# Patient Record
Sex: FEMALE | Race: WHITE | Employment: OTHER | URBAN - METROPOLITAN AREA
[De-identification: names, ages, dates, MRNs, and addresses within clinical notes are randomized per-mention and may not be internally consistent; named-entity substitution may affect disease eponyms.]

---

## 2017-07-06 ENCOUNTER — OFFICE VISIT (OUTPATIENT)
Dept: INTERNAL MEDICINE CLINIC | Age: 72
End: 2017-07-06

## 2017-07-06 VITALS
OXYGEN SATURATION: 98 % | HEIGHT: 68 IN | RESPIRATION RATE: 16 BRPM | SYSTOLIC BLOOD PRESSURE: 118 MMHG | TEMPERATURE: 97.9 F | HEART RATE: 72 BPM | DIASTOLIC BLOOD PRESSURE: 74 MMHG | WEIGHT: 131 LBS | BODY MASS INDEX: 19.85 KG/M2

## 2017-07-06 DIAGNOSIS — R73.09 ELEVATED GLUCOSE: ICD-10-CM

## 2017-07-06 DIAGNOSIS — Q27.9 VENOUS MALFORMATION: ICD-10-CM

## 2017-07-06 DIAGNOSIS — M85.80 OSTEOPENIA, UNSPECIFIED LOCATION: ICD-10-CM

## 2017-07-06 DIAGNOSIS — E78.00 HYPERCHOLESTEROLEMIA: ICD-10-CM

## 2017-07-06 DIAGNOSIS — E03.9 ACQUIRED HYPOTHYROIDISM: Primary | ICD-10-CM

## 2017-07-06 NOTE — PATIENT INSTRUCTIONS
As discussed in your appointment today, [address] is an important part of planning for your healthcare future. Discussing your preferences with your family and your care team is a part of good healthcare so that we can be guided by your known values and goals. Our office offers this service at no cost to you. Our Nurse Navigators and certified [employer] Cho[employer] Facilitators, Asuncion Hammer and Janet Reyes typically schedule family appointments for this service on Wednesdays.  To schedule an Advance Care Planning visit or to receive more information about this service, please call [facility] Internal Medicine at [phone] and ask to speak directly to Tasha Sanchez or [employer]

## 2017-07-06 NOTE — PROGRESS NOTES
HPI:  Gisselle Singh is a 67y.o. year old female who returns to clinic today for follow up: hypothyroid, hypercholesterolemia,     1. Cardiovascular:   Diet and Lifestyle: generally follows a low fat low cholesterol diet, generally follows a low sodium diet. Exercise: yardwork. 2. Hypothyroid: Thyroid ROS: denies fatigue, weight changes, heat/cold intolerance, bowel/skin changes or CVS symptoms. .   3. Osteopenia:  Taking calcium and vit D,  last bone density reviewed. No falls. Current Outpatient Prescriptions   Medication Sig Dispense Refill    levothyroxine (SYNTHROID) 125 mcg tablet Take 1 Tab by mouth Daily (before breakfast). 30 Tab 11    ranitidine (ZANTAC) 150 mg tablet Take 150 mg by mouth daily as needed.  ibuprofen (ADVIL) 200 mg tablet Take  by mouth every six (6) hours as needed.  VALTREX 500 mg tablet as needed.  CALCIUM CARBONATE/VITAMIN D3 (CALCIUM 600 + D PO) Take  by mouth two (2) times a day.  multivitamins-minerals-lutein (CENTRUM SILVER) Tab Take  by mouth. Allergies   Allergen Reactions    Indocin [Indomethacin Sodium] Shortness of Breath and Swelling     Ankles & legs    Boniva [Ibandronate] Other (comments)     indigestion    Fosamax [Alendronate] Other (comments)     Indigestion       Social History   Substance Use Topics    Smoking status: Former Smoker     Quit date: 1960    Smokeless tobacco: Never Used    Alcohol use No         Review of Systems   Constitutional: Negative for malaise/fatigue. Respiratory: Negative for shortness of breath. Cardiovascular: Negative for chest pain and leg swelling. Gastrointestinal: Negative for abdominal pain and heartburn. Neurological: Negative for dizziness and headaches. Physical Exam   Constitutional: She appears well-developed. No distress.    /74  Pulse 72  Temp 97.9 °F (36.6 °C) (Oral)   Resp 16  Ht 5' 8\" (1.727 m)  Wt 131 lb (59.4 kg)  SpO2 98%  BMI 19.92 kg/m2   Neck: Carotid bruit is not present. No thyromegaly present. Cardiovascular: Normal rate, regular rhythm, normal heart sounds and intact distal pulses. No murmur heard. Pulmonary/Chest: Effort normal and breath sounds normal. She has no wheezes. Abdominal: Soft. Bowel sounds are normal. She exhibits no distension and no mass. There is no tenderness. Musculoskeletal: She exhibits no edema. Psychiatric: She has a normal mood and affect. Vitals reviewed. Assessment & Plan:    ICD-10-CM ICD-9-CM    1. Acquired hypothyroidism  Continue current plan and check lab work. E03.9 244.9 TSH 3RD GENERATION   2. Hypercholesterolemia  Please continue to work on low fat, low cholesterol diet and exercise. E78.00 272.0 LIPID PANEL      CBC WITH AUTOMATED DIFF      METABOLIC PANEL, COMPREHENSIVE   3. Osteopenia, unspecified location   Ca vit D and exercise M85.80 733.90 DEXA BONE DENSITY STUDY AXIAL   4. Elevated glucose  Decrease sugar in diet. R73.09 790.29 HEMOGLOBIN A1C WITH EAG      Orders Placed This Encounter    DEXA BONE DENSITY STUDY AXIAL    LIPID PANEL    HEMOGLOBIN A1C WITH EAG    CBC WITH AUTOMATED DIFF    METABOLIC PANEL, COMPREHENSIVE    TSH 3RD GENERATION      Follow-up Disposition:  Return in about 6 months (around 1/6/2018) for follow up. Advised her to call back or return to office if symptoms worsen/change/persist.  Discussed expected course/resolution/complications of diagnosis in detail with patient. Medication risks/benefits/costs/interactions/alternatives discussed with patient. She was given an after visit summary which includes diagnoses, current medications, & vitals. She expressed understanding with the diagnosis and plan.

## 2017-07-06 NOTE — ACP (ADVANCE CARE PLANNING)
As discussed in your appointment today, [address] is an important part of planning for your healthcare future. Discussing your preferences with your family and your care team is a part of good healthcare so that we can be guided by your known values and goals. Our office offers this service at no cost to you. Our Nurse Navigators and certified Respecting Choices ® Facilitators, Willy Sampson and Delmis Nix typically schedule family appointments for this service on Wednesdays.  To schedule an Advance Care Planning visit or to receive more information about this service, please call [employer] [facility] Internal Medicine at [phone] and ask to speak directly to Kenna Magdaleno or Cheryl Rose

## 2017-08-08 LAB
ALBUMIN SERPL-MCNC: 4.3 G/DL (ref 3.5–4.8)
ALBUMIN/GLOB SERPL: 2 {RATIO} (ref 1.2–2.2)
ALP SERPL-CCNC: 65 IU/L (ref 39–117)
ALT SERPL-CCNC: 23 IU/L (ref 0–32)
AST SERPL-CCNC: 28 IU/L (ref 0–40)
BASOPHILS # BLD AUTO: 0.1 X10E3/UL (ref 0–0.2)
BASOPHILS NFR BLD AUTO: 1 %
BILIRUB SERPL-MCNC: 0.4 MG/DL (ref 0–1.2)
BUN SERPL-MCNC: 10 MG/DL (ref 8–27)
BUN/CREAT SERPL: 16 (ref 12–28)
CALCIUM SERPL-MCNC: 9.6 MG/DL (ref 8.7–10.3)
CHLORIDE SERPL-SCNC: 103 MMOL/L (ref 96–106)
CHOLEST SERPL-MCNC: 186 MG/DL (ref 100–199)
CO2 SERPL-SCNC: 25 MMOL/L (ref 18–29)
CREAT SERPL-MCNC: 0.64 MG/DL (ref 0.57–1)
EOSINOPHIL # BLD AUTO: 0.1 X10E3/UL (ref 0–0.4)
EOSINOPHIL NFR BLD AUTO: 2 %
ERYTHROCYTE [DISTWIDTH] IN BLOOD BY AUTOMATED COUNT: 14.6 % (ref 12.3–15.4)
EST. AVERAGE GLUCOSE BLD GHB EST-MCNC: 114 MG/DL
GLOBULIN SER CALC-MCNC: 2.2 G/DL (ref 1.5–4.5)
GLUCOSE SERPL-MCNC: 97 MG/DL (ref 65–99)
HBA1C MFR BLD: 5.6 % (ref 4.8–5.6)
HCT VFR BLD AUTO: 39.9 % (ref 34–46.6)
HDLC SERPL-MCNC: 59 MG/DL
HGB BLD-MCNC: 12.7 G/DL (ref 11.1–15.9)
IMM GRANULOCYTES # BLD: 0 X10E3/UL (ref 0–0.1)
IMM GRANULOCYTES NFR BLD: 0 %
LDLC SERPL CALC-MCNC: 108 MG/DL (ref 0–99)
LYMPHOCYTES # BLD AUTO: 1.1 X10E3/UL (ref 0.7–3.1)
LYMPHOCYTES NFR BLD AUTO: 26 %
MCH RBC QN AUTO: 28.7 PG (ref 26.6–33)
MCHC RBC AUTO-ENTMCNC: 31.8 G/DL (ref 31.5–35.7)
MCV RBC AUTO: 90 FL (ref 79–97)
MONOCYTES # BLD AUTO: 0.3 X10E3/UL (ref 0.1–0.9)
MONOCYTES NFR BLD AUTO: 7 %
NEUTROPHILS # BLD AUTO: 2.6 X10E3/UL (ref 1.4–7)
NEUTROPHILS NFR BLD AUTO: 64 %
PLATELET # BLD AUTO: 292 X10E3/UL (ref 150–379)
POTASSIUM SERPL-SCNC: 4.8 MMOL/L (ref 3.5–5.2)
PROT SERPL-MCNC: 6.5 G/DL (ref 6–8.5)
RBC # BLD AUTO: 4.43 X10E6/UL (ref 3.77–5.28)
SODIUM SERPL-SCNC: 143 MMOL/L (ref 134–144)
TRIGL SERPL-MCNC: 95 MG/DL (ref 0–149)
TSH SERPL DL<=0.005 MIU/L-ACNC: 0.32 UIU/ML (ref 0.45–4.5)
VLDLC SERPL CALC-MCNC: 19 MG/DL (ref 5–40)
WBC # BLD AUTO: 4.1 X10E3/UL (ref 3.4–10.8)

## 2017-08-14 NOTE — PROGRESS NOTES
Notify patient that TSH is not at goal. Confirm taking medication daily and if so will need to decrease levothyroxine to 112 mcg qam # 30 and #1 RF. Recheck TSH in 6 weeks. Otherwise lab work looks good and her cholesterol has improved. Keep up the good work.

## 2017-08-16 DIAGNOSIS — E03.9 ACQUIRED HYPOTHYROIDISM: Primary | ICD-10-CM

## 2017-08-16 RX ORDER — LEVOTHYROXINE SODIUM 112 UG/1
112 TABLET ORAL
Qty: 30 TAB | Refills: 1 | Status: SHIPPED | OUTPATIENT
Start: 2017-08-16 | End: 2017-10-09 | Stop reason: SDUPTHER

## 2017-08-16 NOTE — PROGRESS NOTES
Patient returned call and was notified that TSH is not at goal. Confirmed with pt and she stated is taking medication daily and so informed pt  will need to decrease levothyroxine to 112 mcg qam # 30 and #1 RF. And also informed pt will  Recheck TSH in 6 weeks. Otherwise lab work looks good and her cholesterol has improved. Keep up the good work. Pt verbalized understanding.

## 2017-09-29 LAB — TSH SERPL DL<=0.005 MIU/L-ACNC: 2.21 UIU/ML (ref 0.45–4.5)

## 2017-10-09 DIAGNOSIS — E03.9 ACQUIRED HYPOTHYROIDISM: ICD-10-CM

## 2017-10-09 RX ORDER — LEVOTHYROXINE SODIUM 112 UG/1
112 TABLET ORAL
Qty: 30 TAB | Refills: 11 | Status: SHIPPED | OUTPATIENT
Start: 2017-10-09 | End: 2018-08-28 | Stop reason: DRUGHIGH

## 2017-10-12 DIAGNOSIS — M85.80 OSTEOPENIA, UNSPECIFIED LOCATION: ICD-10-CM

## 2017-12-04 ENCOUNTER — HOSPITAL ENCOUNTER (OUTPATIENT)
Dept: MAMMOGRAPHY | Age: 72
Discharge: HOME | End: 2017-12-04
Attending: INTERNAL MEDICINE
Payer: COMMERCIAL

## 2017-12-04 DIAGNOSIS — Z12.39 BREAST SCREENING: ICD-10-CM

## 2017-12-04 PROCEDURE — 77067 SCR MAMMO BI INCL CAD: CPT

## 2018-08-16 ENCOUNTER — OFFICE VISIT (OUTPATIENT)
Dept: INTERNAL MEDICINE CLINIC | Age: 73
End: 2018-08-16

## 2018-08-16 VITALS
BODY MASS INDEX: 19.67 KG/M2 | HEART RATE: 75 BPM | SYSTOLIC BLOOD PRESSURE: 132 MMHG | HEIGHT: 68 IN | RESPIRATION RATE: 16 BRPM | WEIGHT: 129.8 LBS | DIASTOLIC BLOOD PRESSURE: 70 MMHG | TEMPERATURE: 98 F | OXYGEN SATURATION: 96 %

## 2018-08-16 DIAGNOSIS — K21.9 GASTROESOPHAGEAL REFLUX DISEASE WITHOUT ESOPHAGITIS: ICD-10-CM

## 2018-08-16 DIAGNOSIS — E78.00 HYPERCHOLESTEROLEMIA: ICD-10-CM

## 2018-08-16 DIAGNOSIS — Z00.00 MEDICARE ANNUAL WELLNESS VISIT, SUBSEQUENT: Primary | ICD-10-CM

## 2018-08-16 DIAGNOSIS — E03.9 ACQUIRED HYPOTHYROIDISM: ICD-10-CM

## 2018-08-16 DIAGNOSIS — Z13.31 SCREENING FOR DEPRESSION: ICD-10-CM

## 2018-08-16 DIAGNOSIS — M85.80 OSTEOPENIA, UNSPECIFIED LOCATION: ICD-10-CM

## 2018-08-16 DIAGNOSIS — Z13.39 SCREENING FOR ALCOHOLISM: ICD-10-CM

## 2018-08-16 DIAGNOSIS — A08.4 VIRAL GASTROENTERITIS: ICD-10-CM

## 2018-08-16 RX ORDER — OMEPRAZOLE 20 MG/1
20 CAPSULE, DELAYED RELEASE ORAL
COMMUNITY
Start: 2018-08-14 | End: 2022-04-28 | Stop reason: ALTCHOICE

## 2018-08-16 RX ORDER — ASPIRIN 81 MG/1
TABLET ORAL DAILY
COMMUNITY
End: 2019-11-14 | Stop reason: ALTCHOICE

## 2018-08-16 NOTE — PROGRESS NOTES
Chief Complaint   Patient presents with   [address] Visit     physical     Patient states she is here for her AWV physical.

## 2018-08-16 NOTE — PROGRESS NOTES
This is the Subsequent Medicare Annual Wellness Exam, performed 12 months or more after the Initial AWV or the last Subsequent AWV    I have reviewed the patient's medical history in detail and updated the computerized patient record. History     Past Medical History:   Diagnosis Date    Cancer ([facility] [address] 2016    basal cell skin carcinoma removed.  Gastroesophageal reflux disease without esophagitis 8/18/2016    Herpes     right gluteal outbreaks    Osteoporosis     Thyroid disease     hypothyroidism    Venous malformation 5/19/2015    Left foot and has had embolization procedure. Past Surgical History:   Procedure Laterality Date    HX DILATION AND CURETTAGE      HX OTHER SURGICAL  03/2015    emobolization of left foot for vascular malformation     Current Outpatient Prescriptions   Medication Sig Dispense Refill    omeprazole (PRILOSEC) 20 mg capsule       aspirin delayed-release 81 mg tablet Take  by mouth daily.  levothyroxine (SYNTHROID) 112 mcg tablet Take 1 Tab by mouth Daily (before breakfast). 30 Tab 11    ibuprofen (ADVIL) 200 mg tablet Take  by mouth every six (6) hours as needed.  VALTREX 500 mg tablet as needed.  CALCIUM CARBONATE/VITAMIN D3 (CALCIUM 600 + D PO) Take  by mouth two (2) times a day.  multivitamins-minerals-lutein (CENTRUM SILVER) Tab Take  by mouth.  ranitidine (ZANTAC) 150 mg tablet Take 150 mg by mouth daily as needed.        Allergies   Allergen Reactions    Indocin [Indomethacin Sodium] Shortness of Breath and Swelling     Ankles & legs    Boniva [Ibandronate] Other (comments)     indigestion    Fosamax [Alendronate] Other (comments)     Indigestion       Family History   Problem Relation Age of Onset    Hypertension Mother     Cancer Father     Heart Disease Father      cad    Cancer Sister 72     breast    Breast Cancer Sister 59     Social History   Substance Use Topics    Smoking status: Former Smoker     Quit date: 1960    Smokeless tobacco: Never Used    Alcohol use No     Patient Active Problem List   Diagnosis Code    Hypercholesterolemia E78.00    Hypothyroidism E03.9    Osteopenia M85.80    Venous malformation Q27.9    Advance directive discussed with patient Z71.89    Gastroesophageal reflux disease without esophagitis K21.9       Depression Risk Factor Screening:     PHQ over the last two weeks 8/16/2018   Little interest or pleasure in doing things Not at all   Feeling down, depressed, irritable, or hopeless Not at all   Total Score PHQ 2 0     Alcohol Risk Factor Screening:   Reviewed and not at risk. Functional Ability and Level of Safety:   Hearing Loss  Decreased hearing but does not want hearing testing at this time. Activities of Daily Living  The home contains: no safety equipment. Patient does total self care    Fall Risk  Fall Risk Assessment, last 12 mths 8/16/2018   Able to walk? Yes   Fall in past 12 months? No       Abuse Screen  Patient is not abused    Cognitive Screening   Evaluation of Cognitive Function:  Has your family/caregiver stated any concerns about your memory: no  Normal    Patient Care Team   Patient Care Team:  Rica Riley MD as PCP - Anna Marie Pink MD (Inactive) as Physician (Ophthalmology)  Conner Jackson MD as Physician (Obstetrics & Gynecology)    Assessment/Plan   Education and counseling provided:  Are appropriate based on today's review and evaluation    Health Maintenance Due   Topic Date Due    Influenza Age 5 to Adult  08/01/2018     HPI:  Pranay Page is also here today for follow-up of her medical problems. 1.  Gerd: takes prilosec prn. Improves with dietary changes. 2.  Hypothyroid: Thyroid ROS: denies  weight changes, heat/cold intolerance, bowel/skin changes or CVS symptoms. 3.  A few weeks ago had gastroenteritis that has resolved except for some fatigue. She was seen last week by her gyn and had normal urine and scheduled for pelvic US.  States abdominal pain much improved. Current Outpatient Prescriptions   Medication Sig Dispense Refill    omeprazole (PRILOSEC) 20 mg capsule       aspirin delayed-release 81 mg tablet Take  by mouth daily.  levothyroxine (SYNTHROID) 112 mcg tablet Take 1 Tab by mouth Daily (before breakfast). 30 Tab 11    ibuprofen (ADVIL) 200 mg tablet Take  by mouth every six (6) hours as needed.  VALTREX 500 mg tablet as needed.  CALCIUM CARBONATE/VITAMIN D3 (CALCIUM 600 + D PO) Take  by mouth two (2) times a day.  multivitamins-minerals-lutein (CENTRUM SILVER) Tab Take  by mouth. Allergies   Allergen Reactions    Indocin [Indomethacin Sodium] Shortness of Breath and Swelling     Ankles & legs    Boniva [Ibandronate] Other (comments)     indigestion    Fosamax [Alendronate] Other (comments)     Indigestion       Social History   Substance Use Topics    Smoking status: Former Smoker     Quit date: 1960    Smokeless tobacco: Never Used    Alcohol use No         Review of Systems   Constitutional: Positive for malaise/fatigue. Negative for chills and fever. HENT: Positive for hearing loss (unchanges). Negative for congestion and sore throat. Eyes: Negative for double vision. Respiratory: Negative for shortness of breath. Cardiovascular: Negative for chest pain, palpitations and leg swelling. Gastrointestinal: Negative for heartburn. Genitourinary: Negative for dysuria, frequency and urgency. Musculoskeletal: Positive for back pain (low back pain) and joint pain (arthritis in knees and shoulders. ). Neurological: Positive for dizziness (slight feeling of lightheadedness for about 1 min for the past 2 weeks and has improved. ). Negative for sensory change, speech change, focal weakness and headaches. Psychiatric/Behavioral: Negative for depression, memory loss and suicidal ideas. The patient is not nervous/anxious and does not have insomnia.         Physical Exam   Constitutional: She appears well-developed. No distress. /70 (BP 1 Location: Left arm, BP Patient Position: Sitting)  Pulse 75  Temp 98 °F (36.7 °C) (Oral)   Resp 16  Ht 5' 8\" (1.727 m)  Wt 129 lb 12.8 oz (58.9 kg)  SpO2 96%  BMI 19.74 kg/m2   HENT:   Head: Normocephalic and atraumatic. Right Ear: Tympanic membrane and ear canal normal.   Left Ear: Tympanic membrane and ear canal normal.   Nose: Nose normal.   Mouth/Throat: Oropharynx is clear and moist.   Eyes: Conjunctivae and EOM are normal. Pupils are equal, round, and reactive to light. Neck: Normal range of motion. No thyromegaly present. Cardiovascular: Normal rate, regular rhythm, normal heart sounds and intact distal pulses. No murmur heard. Pulmonary/Chest: Effort normal and breath sounds normal.   Breast exam: breasts appear normal, no suspicious masses, no skin or nipple changes or axillary nodes. Abdominal: Soft. Bowel sounds are normal. She exhibits no mass. There is tenderness (mildly tender right lower abdominal quadrant. ). Musculoskeletal: She exhibits no edema or tenderness. Lymphadenopathy:     She has no cervical adenopathy. Neurological: She has normal strength. No cranial nerve deficit or sensory deficit. Skin: No rash noted. Psychiatric: She has a normal mood and affect. Vitals reviewed. Assessment & Plan:    ICD-10-CM ICD-9-CM    1. Medicare annual wellness visit, subsequent  Health maintenance reviewed and updated with patient today at visit. Z00.00 V70.0    2. Screening for alcoholism Z13.89 V79.1 [place] ANNUAL ALCOHOL SCREEN 15 MIN   3. Screening for depression Z13.89 V79.0 DEPRESSION SCREEN ANNUAL   4. Gastroesophageal reflux disease without esophagitis  Well controlled, continue current medication. K21.9 530.81    5. Osteopenia, unspecified location  Calcium, vitamin D, exercise. M85.80 733.90    6. Acquired hypothyroidism  Continue current plan and check lab work. E03.9 244.9 TSH 3RD GENERATION   7. Hypercholesterolemia  Counseled regarding low-fat low-cholesterol diet. E78.00 272.0 CBC WITH AUTOMATED DIFF      METABOLIC PANEL, COMPREHENSIVE      LIPID PANEL   8. Viral gastroenteritis  Appears to be resolving push fluids and add probiotic. If any acute worsening follow-up. A08.4 008.8        Follow-up Disposition:  Return in about 1 year (around 8/16/2019) for physical.   Stefania Bailey her to call back or return to office if symptoms worsen/change/persist.  Discussed expected course/resolution/complications of diagnosis in detail with patient. Medication risks/benefits/costs/interactions/alternatives discussed with patient. She was given an after visit summary which includes diagnoses, current medications, & vitals. She expressed understanding with the diagnosis and plan.

## 2018-08-21 LAB
ALBUMIN SERPL-MCNC: 4.3 G/DL (ref 3.5–4.8)
ALBUMIN/GLOB SERPL: 1.6 {RATIO} (ref 1.2–2.2)
ALP SERPL-CCNC: 75 IU/L (ref 39–117)
ALT SERPL-CCNC: 20 IU/L (ref 0–32)
AST SERPL-CCNC: 25 IU/L (ref 0–40)
BASOPHILS # BLD AUTO: 0.1 X10E3/UL (ref 0–0.2)
BASOPHILS NFR BLD AUTO: 2 %
BILIRUB SERPL-MCNC: 0.2 MG/DL (ref 0–1.2)
BUN SERPL-MCNC: 10 MG/DL (ref 8–27)
BUN/CREAT SERPL: 12 (ref 12–28)
CALCIUM SERPL-MCNC: 9.9 MG/DL (ref 8.7–10.3)
CHLORIDE SERPL-SCNC: 101 MMOL/L (ref 96–106)
CHOLEST SERPL-MCNC: 195 MG/DL (ref 100–199)
CO2 SERPL-SCNC: 23 MMOL/L (ref 20–29)
CREAT SERPL-MCNC: 0.82 MG/DL (ref 0.57–1)
EOSINOPHIL # BLD AUTO: 0.1 X10E3/UL (ref 0–0.4)
EOSINOPHIL NFR BLD AUTO: 2 %
ERYTHROCYTE [DISTWIDTH] IN BLOOD BY AUTOMATED COUNT: 13.8 % (ref 12.3–15.4)
GLOBULIN SER CALC-MCNC: 2.7 G/DL (ref 1.5–4.5)
GLUCOSE SERPL-MCNC: 98 MG/DL (ref 65–99)
HCT VFR BLD AUTO: 41.5 % (ref 34–46.6)
HDLC SERPL-MCNC: 50 MG/DL
HGB BLD-MCNC: 12.9 G/DL (ref 11.1–15.9)
IMM GRANULOCYTES # BLD: 0 X10E3/UL (ref 0–0.1)
IMM GRANULOCYTES NFR BLD: 0 %
LDLC SERPL CALC-MCNC: 116 MG/DL (ref 0–99)
LYMPHOCYTES # BLD AUTO: 1.6 X10E3/UL (ref 0.7–3.1)
LYMPHOCYTES NFR BLD AUTO: 29 %
MCH RBC QN AUTO: 28.4 PG (ref 26.6–33)
MCHC RBC AUTO-ENTMCNC: 31.1 G/DL (ref 31.5–35.7)
MCV RBC AUTO: 91 FL (ref 79–97)
MONOCYTES # BLD AUTO: 0.4 X10E3/UL (ref 0.1–0.9)
MONOCYTES NFR BLD AUTO: 8 %
NEUTROPHILS # BLD AUTO: 3.1 X10E3/UL (ref 1.4–7)
NEUTROPHILS NFR BLD AUTO: 59 %
PLATELET # BLD AUTO: 382 X10E3/UL (ref 150–379)
POTASSIUM SERPL-SCNC: 4.9 MMOL/L (ref 3.5–5.2)
PROT SERPL-MCNC: 7 G/DL (ref 6–8.5)
RBC # BLD AUTO: 4.54 X10E6/UL (ref 3.77–5.28)
SODIUM SERPL-SCNC: 140 MMOL/L (ref 134–144)
TRIGL SERPL-MCNC: 146 MG/DL (ref 0–149)
TSH SERPL DL<=0.005 MIU/L-ACNC: 5.88 UIU/ML (ref 0.45–4.5)
VLDLC SERPL CALC-MCNC: 29 MG/DL (ref 5–40)
WBC # BLD AUTO: 5.3 X10E3/UL (ref 3.4–10.8)

## 2018-08-22 NOTE — PROGRESS NOTES
Notify patient that TSH is not at goal. Confirm taking medication daily and if so will need to increase levothyroxine to 125 mcg qam # 90 and #0 RF. Recheck TSH in 6 weeks. There is some mild elevation in her cholesterol continue with healthy diet low in fat and continue with exercise. Otherwise lab work looks fine.

## 2018-08-28 DIAGNOSIS — E03.9 ACQUIRED HYPOTHYROIDISM: Primary | ICD-10-CM

## 2018-08-28 RX ORDER — LEVOTHYROXINE SODIUM 125 UG/1
125 TABLET ORAL
Qty: 90 TAB | Refills: 0 | Status: SHIPPED | OUTPATIENT
Start: 2018-08-28 | End: 2018-11-23 | Stop reason: SDUPTHER

## 2018-08-28 RX ORDER — LEVOTHYROXINE SODIUM 125 UG/1
125 TABLET ORAL
Qty: 90 TAB | Refills: 1 | Status: SHIPPED | OUTPATIENT
Start: 2018-08-28 | End: 2018-08-28 | Stop reason: SDUPTHER

## 2018-08-28 NOTE — TELEPHONE ENCOUNTER
Spoke with patient via phone and she states that she has not missed any doses of medication and that she takes meds everyday. Informed her that per Dr. Tamiko Salmon then we will need to increase her levothyroxine to 125 mcg every morning. Meds sent to pharmacy per MD order on lab result.

## 2018-08-28 NOTE — TELEPHONE ENCOUNTER
----- Message from Corey Mejias Harlo Nissen sent at 8/27/2018 12:02 PM EDT -----  Regarding: RE:lab results  Contact: [phone]  Have not missed taking synthroid. Even took it the morning I had lab work done. Please call in prescription. Will do better with diet.  ----- Message -----  From: Jeanie Mccormick LPN  Sent: 2/75/6115  8:55 AM EDT  To: Corey Hester. Mary Nissen  Subject: lab results    Dear Wero Rojas, Dr Brigitte Meza wants to verify you have not missed any of your synthroid as your Tsh is not at goal. If you did not miss any then she needs to increase your dose to 125mcg daily. Then you would need to recheck the lab in 6wks to see that you're back in range. Also your cholesterol is slightly elevated, Dr Brigitte Meza wants you to remain on a low fat diet and exercise. Please confirm this message si I know if we need to send in a new dose .  Thanks, Pelon Low

## 2018-10-04 ENCOUNTER — HOSPITAL ENCOUNTER (OUTPATIENT)
Dept: LAB | Age: 73
Discharge: HOME | End: 2018-10-04
Payer: MEDICARE

## 2018-10-04 PROCEDURE — 84443 ASSAY THYROID STIM HORMONE: CPT

## 2018-10-05 LAB — TSH SERPL DL<=0.005 MIU/L-ACNC: 0.73 UIU/ML (ref 0.45–4.5)

## 2018-11-23 DIAGNOSIS — E03.9 ACQUIRED HYPOTHYROIDISM: ICD-10-CM

## 2018-11-26 RX ORDER — LEVOTHYROXINE SODIUM 125 UG/1
125 TABLET ORAL
Qty: 90 TAB | Refills: 0 | OUTPATIENT
Start: 2018-11-26

## 2018-11-26 NOTE — TELEPHONE ENCOUNTER
Last OV: 8-16-18  Next visit: 8-20-19  Last labs: 10-4-18    Refill request for levothyroxine forwarded to provider for approval.

## 2018-12-06 ENCOUNTER — HOSPITAL ENCOUNTER (OUTPATIENT)
Dept: MAMMOGRAPHY | Age: 73
Discharge: HOME | End: 2018-12-06
Attending: INTERNAL MEDICINE
Payer: MEDICARE

## 2018-12-06 DIAGNOSIS — Z12.39 SCREENING BREAST EXAMINATION: ICD-10-CM

## 2018-12-06 PROCEDURE — 77067 SCR MAMMO BI INCL CAD: CPT

## 2019-01-31 NOTE — PATIENT INSTRUCTIONS
Medicare Wellness Visit, Female     The best way to live healthy is to have a lifestyle where you eat a well-balanced diet, exercise regularly, limit alcohol use, and quit all forms of tobacco/nicotine, if applicable. Regular preventive services are another way to keep healthy. Preventive services (vaccines, screening tests, monitoring & exams) can help personalize your care plan, which helps you manage your own care. Screening tests can find health problems at the earliest stages, when they are easiest to treat. [facility] follows the current, evidence-based guidelines published by the [facility] Thierry Jonathon ([facility]) when recommending preventive services for our patients. Because we follow these guidelines, sometimes recommendations change over time as research supports it. (For example, mammograms used to be recommended annually. Even though Medicare will still pay for an annual mammogram, the newer guidelines recommend a mammogram every two years for women of average risk.)  Of course, you and your doctor may decide to screen more often for some diseases, based on your risk and your health status. Preventive services for you include:  - Medicare offers their members a free annual wellness visit, which is time for you and your primary care provider to discuss and plan for your preventive service needs. Take advantage of this benefit every year!  -All adults over the age of 72 should receive the recommended pneumonia vaccines. Current USPSTF guidelines recommend a series of two vaccines for the best pneumonia protection.   -All adults should have a flu vaccine yearly and a tetanus vaccine every 10 years. All adults age 61 and older should receive a shingles vaccine once in their lifetime.    -A bone mass density test is recommended when a woman turns 65 to screen for osteoporosis. This test is only recommended one time, as a screening.  Some providers will use this same test as a disease monitoring tool if you already have osteoporosis. -All adults age 38-68 who are overweight should have a diabetes screening test once every three years.   -Other screening tests and preventive services for persons with diabetes include: an eye exam to screen for diabetic retinopathy, a kidney function test, a foot exam, and stricter control over your cholesterol.   -Cardiovascular screening for adults with routine risk involves an electrocardiogram (ECG) at intervals determined by your doctor.   -Colorectal cancer screenings should be done for adults age 54-65 with no increased risk factors for colorectal cancer. There are a number of acceptable methods of screening for this type of cancer. Each test has its own benefits and drawbacks. Discuss with your doctor what is most appropriate for you during your annual wellness visit. The different tests include: colonoscopy (considered the best screening method), a fecal occult blood test, a fecal DNA test, and sigmoidoscopy. -Breast cancer screenings are recommended every other year for women of normal risk, age 54-69.  -Cervical cancer screenings for women over age 72 are only recommended with certain risk factors.   -All adults born between [facility] should be screened once for Hepatitis C. Here is a list of your current Health Maintenance items (your personalized list of preventive services) with a due date:  Health Maintenance Due   Topic Date Due    Flu Vaccine  08/01/2018     As discussed in your appointment today, Advance Care Planning is an important part of planning for your healthcare future. Discussing your preferences with your family and your care team is a part of good healthcare so that we can be guided by your known values and goals. Our office offers this service at no cost to you.  Our Nurse Navigators and certified Respecting Choices ® Facilitators, Antonino Segura and Friday lillian typically schedule family appointments for this service on Wednesdays.  To schedule an Advance Care Planning visit or to receive more information about this service, please call [facility] Internal Medicine at [phone] and ask to speak directly to Geno Maldonado or Avelina Menjivar WDL

## 2019-03-08 ENCOUNTER — OFFICE VISIT (OUTPATIENT)
Dept: INTERNAL MEDICINE CLINIC | Age: 74
End: 2019-03-08

## 2019-03-08 ENCOUNTER — HOSPITAL ENCOUNTER (OUTPATIENT)
Dept: LAB | Age: 74
Discharge: HOME | End: 2019-03-08
Payer: MEDICARE

## 2019-03-08 VITALS
BODY MASS INDEX: 20.16 KG/M2 | TEMPERATURE: 97.8 F | HEIGHT: 68 IN | HEART RATE: 78 BPM | RESPIRATION RATE: 16 BRPM | OXYGEN SATURATION: 99 % | SYSTOLIC BLOOD PRESSURE: 136 MMHG | WEIGHT: 133 LBS | DIASTOLIC BLOOD PRESSURE: 80 MMHG

## 2019-03-08 DIAGNOSIS — R55 SYNCOPE, UNSPECIFIED SYNCOPE TYPE: Primary | ICD-10-CM

## 2019-03-08 DIAGNOSIS — E03.9 ACQUIRED HYPOTHYROIDISM: ICD-10-CM

## 2019-03-08 LAB
BILIRUB UR QL STRIP: NEGATIVE
GLUCOSE UR-MCNC: NEGATIVE MG/DL
KETONES P FAST UR STRIP-MCNC: NEGATIVE MG/DL
PH UR STRIP: 7 [PH] (ref 4.6–8)
PROT UR QL STRIP: NEGATIVE
SP GR UR STRIP: 1.01 (ref 1–1.03)
UA UROBILINOGEN AMB POC: NORMAL (ref 0.2–1)
URINALYSIS CLARITY POC: CLEAR
URINALYSIS COLOR POC: YELLOW
URINE BLOOD POC: NEGATIVE
URINE LEUKOCYTES POC: NEGATIVE
URINE NITRITES POC: NEGATIVE

## 2019-03-08 PROCEDURE — 80053 COMPREHEN METABOLIC PANEL: CPT

## 2019-03-08 PROCEDURE — 36415 COLL VENOUS BLD VENIPUNCTURE: CPT

## 2019-03-08 NOTE — PROGRESS NOTES
HPI:  Isabel Larsen is a 68y.o. year old female who returns to clinic today for an acute visit: Syncopal episode. She was seen at [facility] 2/23/19 after she had fainting episode. She reports had been having episodes of lightheadedness for the past few months. She was in the shower and bent over to  soap and on standing up felt dizzy and the next thing she new she was lying on the shower floor. LOC for about 1 minute according to her  who heard her fall. She pulled muscle right posterior leg which is improving. She does think she hit her head and had an area of soreness which seems to be resolved. She notes when she feels like she is about to pass out she has a \"fading sensation\" and has symptoms almost daily. Symptoms are exacerbated by change in position. [facility] note, lab work and EKG are reviewed and scanned to chart. She reports she is drinking 6 glasses of fluid as a combination of green tea and water. Current Outpatient Medications   Medication Sig Dispense Refill    levothyroxine (SYNTHROID) 125 mcg tablet Take 1 Tab by mouth Daily (before breakfast). 90 Tab 3    omeprazole (PRILOSEC) 20 mg capsule       aspirin delayed-release 81 mg tablet Take  by mouth daily.  varicella-zoster recombinant, PF, (SHINGRIX) 50 mcg/0.5 mL susr injection 0.5 ml intramuscular now and then repeat in 2-4 months. 0.5 mL 1    ibuprofen (ADVIL) 200 mg tablet Take  by mouth every six (6) hours as needed.  VALTREX 500 mg tablet as needed.  CALCIUM CARBONATE/VITAMIN D3 (CALCIUM 600 + D PO) Take  by mouth two (2) times a day.  multivitamins-minerals-lutein (CENTRUM SILVER) Tab Take  by mouth.        Allergies   Allergen Reactions    Indocin [Indomethacin Sodium] Shortness of Breath and Swelling     Ankles & legs    Boniva [Ibandronate] Other (comments)     indigestion    Fosamax [Alendronate] Other (comments)     Indigestion       Social History     Tobacco Use    Smoking status: Former Smoker     Last attempt to quit: 1960     Years since quittin.2    Smokeless tobacco: Never Used   Substance Use Topics    Alcohol use: No         Review of Systems   Constitutional: Negative for malaise/fatigue. Respiratory: Negative for shortness of breath. Cardiovascular: Negative for chest pain, palpitations and leg swelling. Gastrointestinal: Negative for abdominal pain, blood in stool and heartburn. Neurological: Negative for sensory change, speech change, focal weakness and headaches. Physical Exam   Constitutional: She appears well-developed. No distress. HENT:   Head: Normocephalic and atraumatic. Nose: Nose normal.   Mouth/Throat: Oropharynx is clear and moist.   Eyes: Conjunctivae and EOM are normal. Pupils are equal, round, and reactive to light. Neck: Carotid bruit is not present. No thyromegaly present. Cardiovascular: Normal rate, regular rhythm, normal heart sounds and intact distal pulses. No murmur heard. Pulmonary/Chest: Effort normal and breath sounds normal. She has no wheezes. Abdominal: Soft. Bowel sounds are normal. She exhibits no distension and no mass. There is no tenderness. Musculoskeletal: She exhibits no edema. Lymphadenopathy:     She has no cervical adenopathy. Neurological: She has normal strength. No cranial nerve deficit or sensory deficit. Psychiatric: She has a normal mood and affect. Vitals reviewed. Assessment & Plan:    ICD-10-CM ICD-9-CM    1. Syncope, unspecified syncope type  Etiology unclear and will need to rule out arrhythmia. Not orthostatic on exam.  Appears to be well-hydrated. Holter monitor and lab work ordered. If no etiology will refer to cardiology for further evaluation. Reviewed EKG which shows some bradycardia from [facility].  Encouraged to increase fluid intake. Counseled regarding red flags to contact me or go to the emergency room.  D44 250.7 METABOLIC PANEL, COMPREHENSIVE      CARDIAC HOLTER MONITOR      AMB POC URINALYSIS DIP STICK MANUAL W/O MICRO      CANCELED: AMB POC EKG 24HR MONITORING   2. Acquired hypothyroidism  Continue current medication. E03.9 244.9        Stop asa as has been on for prevention only. Follow-up Disposition:  Return in about 4 weeks (around 4/5/2019). Advised her to call back or return to office if symptoms worsen/change/persist.  Discussed expected course/resolution/complications of diagnosis in detail with patient. Medication risks/benefits/costs/interactions/alternatives discussed with patient. She was given an after visit summary which includes diagnoses, current medications, & vitals. She expressed understanding with the diagnosis and plan.

## 2019-03-08 NOTE — PATIENT INSTRUCTIONS
Fainting: Care Instructions  Your Care Instructions    When you faint, or pass out, you lose consciousness for a short time. A brief drop in blood flow to the brain often causes it. When you fall or lie down, more blood flows to your brain and you regain consciousness. Emotional stress, pain, or overheatingespecially if you have been standingcan make you faint. In these cases, fainting is usually not serious. But fainting can be a sign of a more serious problem. Your doctor may want you to have more tests to rule out other causes. The treatment you need depends on the reason why you fainted. The doctor has checked you carefully, but problems can develop later. If you notice any problems or new symptoms, get medical treatment right away. Follow-up care is a key part of your treatment and safety. Be sure to make and go to all appointments, and call your doctor if you are having problems. It's also a good idea to know your test results and keep a list of the medicines you take. How can you care for yourself at home? · Drink plenty of fluids to prevent dehydration. If you have kidney, heart, or liver disease and have to limit fluids, talk with your doctor before you increase your fluid intake. When should you call for help? Call 911 anytime you think you may need emergency care. For example, call if:    · You have symptoms of a heart problem. These may include:  ? Chest pain or pressure. ? Severe trouble breathing. ? A fast or irregular heartbeat. ? Lightheadedness or sudden weakness. ? Coughing up pink, foamy mucus. ? Passing out. After you call 911, the  may tell you to chew 1 adult-strength or 2 to 4 low-dose aspirin. Wait for an ambulance. Do not try to drive yourself.     · You have symptoms of a stroke. These may include:  ? Sudden numbness, tingling, weakness, or loss of movement in your face, arm, or leg, especially on only one side of your body. ? Sudden vision changes.   ? Sudden trouble speaking. ? Sudden confusion or trouble understanding simple statements. ? Sudden problems with walking or balance. ? A sudden, severe headache that is different from past headaches.     · You passed out (lost consciousness) again.    Watch closely for changes in your health, and be sure to contact your doctor if:    · You do not get better as expected. Where can you learn more? Go to http://vilma-az.info/. Enter J671 in the search box to learn more about \"Fainting: Care Instructions. \"  Current as of: September 23, 2018  Content Version: 11.9  © 1480-5170 [employer]. Care instructions adapted under license by [employer] (which disclaims liability or warranty for this information). If you have questions about a medical condition or this instruction, always ask your healthcare professional. [address] any warranty or liability for your use of this information.

## 2019-03-09 LAB
ALBUMIN SERPL-MCNC: 4.2 G/DL (ref 3.5–4.8)
ALBUMIN/GLOB SERPL: 1.6 {RATIO} (ref 1.2–2.2)
ALP SERPL-CCNC: 79 IU/L (ref 39–117)
ALT SERPL-CCNC: 17 IU/L (ref 0–32)
AST SERPL-CCNC: 22 IU/L (ref 0–40)
BILIRUB SERPL-MCNC: 0.3 MG/DL (ref 0–1.2)
BUN SERPL-MCNC: 11 MG/DL (ref 8–27)
BUN/CREAT SERPL: 13 (ref 12–28)
CALCIUM SERPL-MCNC: 10.1 MG/DL (ref 8.7–10.3)
CHLORIDE SERPL-SCNC: 100 MMOL/L (ref 96–106)
CO2 SERPL-SCNC: 27 MMOL/L (ref 20–29)
CREAT SERPL-MCNC: 0.83 MG/DL (ref 0.57–1)
GLOBULIN SER CALC-MCNC: 2.7 G/DL (ref 1.5–4.5)
GLUCOSE SERPL-MCNC: 83 MG/DL (ref 65–99)
POTASSIUM SERPL-SCNC: 4.8 MMOL/L (ref 3.5–5.2)
PROT SERPL-MCNC: 6.9 G/DL (ref 6–8.5)
SODIUM SERPL-SCNC: 142 MMOL/L (ref 134–144)

## 2019-03-15 ENCOUNTER — CLINICAL SUPPORT (OUTPATIENT)
Dept: CARDIOLOGY CLINIC | Age: 74
End: 2019-03-15

## 2019-03-15 DIAGNOSIS — R55 SYNCOPE, UNSPECIFIED SYNCOPE TYPE: ICD-10-CM

## 2019-03-22 ENCOUNTER — DOCUMENTATION ONLY (OUTPATIENT)
Dept: CARDIOLOGY CLINIC | Age: 74
End: 2019-03-22

## 2019-03-24 NOTE — PROGRESS NOTES
Notify patient that Holter monitor shows no obvious cause for her to have had the fainting episode. Would like her to be seen by cardiology for further evaluation. Please assist patient to make an appointment with Dr. Jose Armando Allen or partner.

## 2019-03-25 NOTE — PROGRESS NOTES
Patient notified results of holter monitor, she verbalized understanding, she prefers to call and schedule an appt at her convenience at [address], she will call back if she has any issues.

## 2019-04-02 ENCOUNTER — OFFICE VISIT (OUTPATIENT)
Dept: CARDIOLOGY CLINIC | Age: 74
End: 2019-04-02

## 2019-04-02 VITALS
BODY MASS INDEX: 20.25 KG/M2 | WEIGHT: 133.6 LBS | DIASTOLIC BLOOD PRESSURE: 80 MMHG | HEART RATE: 77 BPM | SYSTOLIC BLOOD PRESSURE: 124 MMHG | OXYGEN SATURATION: 100 % | HEIGHT: 68 IN | RESPIRATION RATE: 16 BRPM

## 2019-04-02 DIAGNOSIS — R55 SYNCOPE, UNSPECIFIED SYNCOPE TYPE: Primary | ICD-10-CM

## 2019-04-02 DIAGNOSIS — E78.00 HYPERCHOLESTEROLEMIA: ICD-10-CM

## 2019-04-02 NOTE — PROGRESS NOTES
HISTORY OF PRESENT ILLNESS  Jordy Yancey is a 68 y.o. female     SUMMARY:   Problem List  Date Reviewed: 4/2/2019          Codes Class Noted    Advance directive discussed with patient ICD-10-CM: Z71.89  ICD-9-CM: V65.49  8/18/2016    Overview Signed 8/18/2016  9:25 AM by Britt Ly MD     End of life planning discussed with patient. Patient states that they do not have an advance care plan at this time. Information has been provided in today's              Gastroesophageal reflux disease without esophagitis ICD-10-CM: K21.9  ICD-9-CM: 530.81  8/18/2016        Venous malformation ICD-10-CM: Q27.9  ICD-9-CM: 747.60  5/19/2015    Overview Signed 7/6/2017 12:13 PM by Britt Ly MD     Left foot and has had embolization procedure. Hypercholesterolemia ICD-10-CM: E78.00  ICD-9-CM: 272.0  2/12/2010        Hypothyroidism ICD-10-CM: E03.9  ICD-9-CM: 244.9  2/12/2010        Osteopenia ICD-10-CM: M85.80  ICD-9-CM: 733.90  2/12/2010    Overview Signed 2/18/2011  4:45 PM by Britt Ly MD     Last reclast 4/10, recent bone density 1/11 osteopenia mild, hold reclast                   Current Outpatient Medications on File Prior to Visit   Medication Sig    levothyroxine (SYNTHROID) 125 mcg tablet Take 1 Tab by mouth Daily (before breakfast).  omeprazole (PRILOSEC) 20 mg capsule     ibuprofen (ADVIL) 200 mg tablet Take  by mouth every six (6) hours as needed.  VALTREX 500 mg tablet as needed.  CALCIUM CARBONATE/VITAMIN D3 (CALCIUM 600 + D PO) Take  by mouth two (2) times a day.  multivitamins-minerals-lutein (CENTRUM SILVER) Tab Take  by mouth.  aspirin delayed-release 81 mg tablet Take  by mouth daily. No current facility-administered medications on file prior to visit.         CARDIOLOGY STUDIES TO DATE:  3/19 holter, rare pvcs and pacs, occasional short runs atrial tachycardia    Chief Complaint   Patient presents with    Dizziness     HPI :  Ms. Caren Faustin is a 68 year-old referred by Dr. Destinee Mohan for cardiac evaluation. For the last couple of months, she has had recurrent mild orthostatic type symptoms. A couple of weeks ago, she was in the shower about 10:00 in the morning, bent over to  the soap and when she came back up, got very dizzy and lightheaded and then fainted. She felt quite weak for a while after, but then recovered and she continues to have mild orthostatic type symptoms. She has lost some weight over the last year, but says things have stabilized recently. She does not favor salty foods nor does she add salt to her food. She drinks at least four glasses of water and two cups of green tea per day and she has worn support hose for years because of vascular malformation on her foot. There is no history of hypertension or diabetes. She quit smoking in the 1960s. Family history is negative for premature coronary disease. In 08/2018, her HDL was 50 and her LDL was 116. Her most recent EKG showed normal sinus rhythm, normal intervals and axis and no ST-T wave changes. She has some mild generalized arthritis and low back pain. rare palpitations for years only lasting a few seconds. CARDIAC ROS:   negative for chest pain, dyspnea, syncope, orthopnea, paroxysmal nocturnal dyspnea, exertional chest pressure/discomfort, claudication, lower extremity edema    Family History   Problem Relation Age of Onset    Hypertension Mother     Cancer Father     Heart Disease Father         cad    Cancer Sister 72        breast    Breast Cancer Sister 59       Past Medical History:   Diagnosis Date    Cancer ([address] 2016    basal cell skin carcinoma removed.  Gastroesophageal reflux disease without esophagitis 8/18/2016    Herpes     right gluteal outbreaks    Osteoporosis     Thyroid disease     hypothyroidism    Venous malformation 5/19/2015    Left foot and has had embolization procedure.         GENERAL ROS:  A comprehensive review of systems was negative except for that written in the HPI. Visit Vitals  /80 (BP 1 Location: Left arm, BP Patient Position: Sitting)   Pulse 77   Resp 16   Ht 5' 8\" (1.727 m)   Wt 133 lb 9.6 oz (60.6 kg)   SpO2 100%   BMI 20.31 kg/m²       Wt Readings from Last 3 Encounters:   04/02/19 133 lb 9.6 oz (60.6 kg)   03/08/19 133 lb (60.3 kg)   08/16/18 129 lb 12.8 oz (58.9 kg)            BP Readings from Last 3 Encounters:   04/02/19 124/80   03/08/19 136/80   08/16/18 132/70       PHYSICAL EXAM  General appearance: alert, cooperative, no distress, appears stated age  Neurologic: Alert and oriented X 3  Neck: supple, symmetrical, trachea midline, no adenopathy, no carotid bruit and no JVD  Lungs: clear to auscultation bilaterally  Heart: regular rate and rhythm, S1, S2 normal, no murmur, click, rub or gallop  Abdomen: soft, non-tender.  Bowel sounds normal. No masses,  no organomegaly  Extremities: extremities normal, atraumatic, no cyanosis or edema  Pulses: 2+ and symmetric    Lab Results   Component Value Date/Time    Cholesterol, total 195 08/20/2018 08:58 AM    Cholesterol, total 186 08/07/2017 08:32 AM    Cholesterol, total 204 (H) 09/16/2016 08:50 AM    Cholesterol, total 216 (H) 06/25/2015 07:51 AM    Cholesterol, total 209 (H) 03/21/2014 08:14 AM    HDL Cholesterol 50 08/20/2018 08:58 AM    HDL Cholesterol 59 08/07/2017 08:32 AM    HDL Cholesterol 59 09/16/2016 08:50 AM    HDL Cholesterol 55 06/25/2015 07:51 AM    HDL Cholesterol 55 03/21/2014 08:14 AM    LDL, calculated 116 (H) 08/20/2018 08:58 AM    LDL, calculated 108 (H) 08/07/2017 08:32 AM    LDL, calculated 121 (H) 09/16/2016 08:50 AM    LDL, calculated 129 (H) 06/25/2015 07:51 AM    LDL, calculated 125 (H) 03/21/2014 08:14 AM    Triglyceride 146 08/20/2018 08:58 AM    Triglyceride 95 08/07/2017 08:32 AM    Triglyceride 120 09/16/2016 08:50 AM    Triglyceride 158 (H) 06/25/2015 07:51 AM    Triglyceride 147 03/21/2014 08:14 AM    CHOL/HDL Ratio 4.2 10/07/2010 09:16 AM    CHOL/HDL Ratio 3.9 02/12/2010 09:49 AM     ASSESSMENT  Ms. Víctor Escalera appears to have developed some orthostatic hypotension. She is not sure exactly why. Her Holter did not show anything serious, so we talked about that. She is not having any symptoms suggestive of angina or heart failure. She already wears support hose. I think she should liberalize her salt intake and right now I do not see any role for Florinef or other medications. Obviously, she is fortunate in that she does get warning, so avoidance, particularly in the hot weather and sitting down when she feels this coming on should be effective as well. We are going to get an echocardiogram on her. current treatment plan is effective, no change in therapy  lab results and schedule of future lab studies reviewed with patient  reviewed diet, exercise and weight control    Encounter Diagnoses   Name Primary?  Syncope, unspecified syncope type Yes    Hypercholesterolemia      No orders of the defined types were placed in this encounter. Follow-up and Dispositions    · Return in about 1 month (around 4/30/2019).          Derald Riedel, MD  4/2/2019

## 2019-04-09 ENCOUNTER — TELEPHONE (OUTPATIENT)
Dept: CARDIOLOGY CLINIC | Age: 74
End: 2019-04-09

## 2019-04-09 NOTE — TELEPHONE ENCOUNTER
Called patient. Verified patient's identity with two identifiers. Notified patient of results and Dr. Hannah Madrigal message. Patient verbalized understanding and denied further questions or concerns.

## 2019-04-09 NOTE — TELEPHONE ENCOUNTER
----- Message from Jonathan Arguello MD sent at 4/9/2019  8:20 AM EDT -----  Heart muscle is strong and heart valves all ok.  Looks great

## 2019-11-14 ENCOUNTER — OFFICE VISIT (OUTPATIENT)
Dept: INTERNAL MEDICINE CLINIC | Age: 74
End: 2019-11-14

## 2019-11-14 ENCOUNTER — HOSPITAL ENCOUNTER (OUTPATIENT)
Dept: LAB | Age: 74
Discharge: HOME | End: 2019-11-14
Payer: MEDICARE

## 2019-11-14 VITALS
HEART RATE: 88 BPM | HEIGHT: 68 IN | OXYGEN SATURATION: 97 % | DIASTOLIC BLOOD PRESSURE: 68 MMHG | RESPIRATION RATE: 16 BRPM | TEMPERATURE: 98 F | WEIGHT: 139 LBS | BODY MASS INDEX: 21.07 KG/M2 | SYSTOLIC BLOOD PRESSURE: 116 MMHG

## 2019-11-14 DIAGNOSIS — Z13.39 SCREENING FOR ALCOHOLISM: ICD-10-CM

## 2019-11-14 DIAGNOSIS — K21.9 GASTROESOPHAGEAL REFLUX DISEASE WITHOUT ESOPHAGITIS: ICD-10-CM

## 2019-11-14 DIAGNOSIS — M85.80 OSTEOPENIA, UNSPECIFIED LOCATION: ICD-10-CM

## 2019-11-14 DIAGNOSIS — Z23 NEED FOR VACCINATION WITH 13-POLYVALENT PNEUMOCOCCAL CONJUGATE VACCINE: ICD-10-CM

## 2019-11-14 DIAGNOSIS — Z00.00 MEDICARE ANNUAL WELLNESS VISIT, INITIAL: ICD-10-CM

## 2019-11-14 DIAGNOSIS — Z71.89 ADVANCE CARE PLANNING: ICD-10-CM

## 2019-11-14 DIAGNOSIS — Z13.31 SCREENING FOR DEPRESSION: ICD-10-CM

## 2019-11-14 DIAGNOSIS — E78.00 HYPERCHOLESTEROLEMIA: ICD-10-CM

## 2019-11-14 DIAGNOSIS — Z78.0 POSTMENOPAUSAL: ICD-10-CM

## 2019-11-14 DIAGNOSIS — E03.9 ACQUIRED HYPOTHYROIDISM: Primary | ICD-10-CM

## 2019-11-14 DIAGNOSIS — Z12.39 BREAST CANCER SCREENING: ICD-10-CM

## 2019-11-14 PROCEDURE — 84443 ASSAY THYROID STIM HORMONE: CPT

## 2019-11-14 PROCEDURE — 85025 COMPLETE CBC W/AUTO DIFF WBC: CPT

## 2019-11-14 PROCEDURE — 36415 COLL VENOUS BLD VENIPUNCTURE: CPT

## 2019-11-14 PROCEDURE — 80061 LIPID PANEL: CPT

## 2019-11-14 PROCEDURE — 80053 COMPREHEN METABOLIC PANEL: CPT

## 2019-11-14 RX ORDER — VALACYCLOVIR HYDROCHLORIDE 500 MG/1
500 TABLET, FILM COATED ORAL 2 TIMES DAILY
Qty: 30 TAB | Refills: 0 | Status: SHIPPED | OUTPATIENT
Start: 2019-11-14 | End: 2019-11-17

## 2019-11-14 NOTE — PATIENT INSTRUCTIONS
Today you had a Medicare Wellness Visit. During this visit, we developed and/or updated your personalized health plan to prevent disease and disability based on your current health and risk factors. Please schedule an appt around this time next year so we can continue to keep you on the right path to living a healthy lifestyle. Schedule of Personalized Health Plan The best way to stay healthy is to live a healthy lifestyle. A healthy lifestyle includes regular exercise, eating a well-balanced diet, keeping a healthy weight and not smoking. Regular physical exams and screening tests are another important way to take care of yourself. Preventive exams provided by health care providers can find health problems early when treatment works best and can keep you from getting certain diseases or illnesses. Preventive services include exams, lab tests, screenings, shots, monitoring and information to help you take care of your own health. All people over 65 should have a pneumonia shot. Pneumonia shots are usually only needed once in a lifetime unless your doctor decides differently. All people over 65 should have a yearly flu shot. People over 65 are at medium to high risk for Hepatitis B. Three shots are needed for complete protection. For additional information, please discuss with physician. In addition to your physical exam, some screening tests are recommended: 
 
Bone mass measurement (dexa scan) is recommended every  two years. Diabetes Mellitus screening is recommended every year. Glaucoma is an eye disease caused by high pressure in the eye. An eye exam is recommended every year. Cardiovascular screening tests that check your cholesterol and other blood fat (lipid) levels are recommended every five years.   
 
Colorectal Cancer screening tests help to find pre-cancerous polyps (growths in the colon) so they can be removed before they turn into cancer. Tests ordered for screening depend on your personal and family history risk factors. Mammogram screening for Breast Cancer is recommended yearly. Screening for Cervical Cancer is recommended every two years (annually for certain risk factors, such as previous history of STD or abnormal PAP in past 7 years). Here is a list of your current Health Maintenance items with a due date: 
Health Maintenance Topic Date Due  GLAUCOMA SCREENING Q2Y  01/15/2019  COLONOSCOPY  03/27/2020  Shingrix Vaccine Age 50> (1 of 2) 02/19/2020 (Originally 5/29/1995)  MEDICARE YEARLY EXAM  11/14/2020  BREAST CANCER SCRN MAMMOGRAM  12/06/2020  
 DTaP/Tdap/Td series (2 - Td) 05/19/2025  Hepatitis C Screening  Completed  Bone Densitometry (Dexa) Screening  Completed  Influenza Age 5 to Adult  Completed  Pneumococcal 65+ years  Completed

## 2019-11-14 NOTE — PROGRESS NOTES
Thea Matthew is a 76 y.o. female and presents for Annual Medicare Wellness Visit. Assessment of cognitive impairment: Alert and oriented x 3. Abuse Screen:    Abuse Screening Questionnaire 11/14/2019   Do you ever feel afraid of your partner? N   Are you in a relationship with someone who physically or mentally threatens you? N   Is it safe for you to go home? Y       Depression Screen:   3 most recent PHQ Screens 11/14/2019   Little interest or pleasure in doing things Not at all   Feeling down, depressed, irritable, or hopeless Not at all   Total Score PHQ 2 0       Fall Risk Assessment:    Fall Risk Assessment, last 12 mths 11/14/2019   Able to walk? Yes   Fall in past 12 months? No   Fall with injury? -   Number of falls in past 12 months -   Fall Risk Score -       Activities of Daily Living:    ADL Assessment 11/14/2019   Feeding yourself No Help Needed   Getting from bed to chair No Help Needed   Getting dressed No Help Needed   Bathing or showering No Help Needed   Walk across the room (includes cane/walker) No Help Needed   Using the telphone No Help Needed   Taking your medications No Help Needed   Preparing meals No Help Needed   Managing money (expenses/bills) No Help Needed   Moderately strenuous housework (laundry) No Help Needed   Shopping for personal items (toiletries/medicines) No Help Needed   Shopping for groceries No Help Needed   Driving No Help Needed   Climbing a flight of stairs No Help Needed   Getting to places beyond walking distances No Help Needed       Health Maintenance:  Daily Low Dose Aspirin: no  Bone Density: order placed  Glaucoma Screening: yes UTD with [facility]I, records requested  Immunizations:    Tetanus: up to date 2015. Influenza: up to date 2019. Shingles:  Zostavax: up to date. Shingrix:patient declines   Pneumovax:  up to date 2013. Prevnar: up to date 2015. Cancer screening:    Cervical: NA.  Breast: order placed due next month.   Colon: up to date but due March  (q3 years due to pre cancerous polyps). Prostate:  NA    Advance Care Planning:   End of Life Planning: has NO advanced directive - not interested in additional information. Provided pt with \"Respecting Choices packet of Information\" no  Offered facilitator session with NN no     Medications/Allergies: Reviewed with patient  Prior to Admission medications    Medication Sig Start Date End Date Taking? Authorizing Provider   levothyroxine (SYNTHROID) 125 mcg tablet Take 1 Tab by mouth Daily (before breakfast). 18  Yes Nicole Banda MD   omeprazole (PRILOSEC) 20 mg capsule  18  Yes Provider, Historical   CALCIUM CARBONATE/VITAMIN D3 (CALCIUM 600 + D PO) Take  by mouth two (2) times a day. 2/12/10  Yes Provider, Historical   multivitamins-minerals-lutein (CENTRUM SILVER) Tab Take  by mouth. Yes Provider, Historical   aspirin delayed-release 81 mg tablet Take  by mouth daily. 19  Provider, Historical   ibuprofen (ADVIL) 200 mg tablet Take  by mouth every six (6) hours as needed. Provider, Historical   VALTREX 500 mg tablet as needed.  1/8/10   Provider, Historical     Allergies   Allergen Reactions    Indocin [Indomethacin Sodium] Shortness of Breath and Swelling     Ankles & legs    Boniva [Ibandronate] Other (comments)     indigestion    Fosamax [Alendronate] Other (comments)     Indigestion         PSH: Reviewed with patient  Past Surgical History:   Procedure Laterality Date    HX DILATION AND CURETTAGE      HX OTHER SURGICAL  2015    emobolization of left foot for vascular malformation        SH: Reviewed with patient  Social History     Tobacco Use    Smoking status: Former Smoker     Last attempt to quit: 1960     Years since quittin.9    Smokeless tobacco: Never Used   Substance Use Topics    Alcohol use: No    Drug use: No       FH: Reviewed with patient  Family History   Problem Relation Age of Onset    Hypertension Mother     Cancer Father     Heart Disease Father         cad    Cancer Sister 72        breast    Breast Cancer Sister 59         Objective:  Visit Vitals  /68 (BP 1 Location: Right arm)   Pulse 88   Temp 98 °F (36.7 °C) (Oral)   Resp 16   Ht 5' 8\" (1.727 m)   Wt 139 lb (63 kg)   SpO2 97%   BMI 21.13 kg/m²    Body mass index is 21.13 kg/m². Alcohol Risk Screen:   On any occasion during past 3 months, have you had more than 3 drinks (female) or 4 drinks (male) containing alcohol? No  Do you average more than 7 drinks (female) or 14 drinks (male) per week? No  Type and Amount: n/a    Tobacco Abuse:  No    Nutrition Screen:  eats a balanced diet    Hearing Loss:  Patient reports hearing loss greater in left ear than right    Vision Loss:   Wears glasses, contact lenses, or have any other visual impairment  Wears reading and rx glasses    Activities of Daily Living:  Self-care. Requires assistance with: no ADLs  Patient handle his/her own medications  yes Use of pill box  yes        Current medical providers:    Patient Care Team:  Gisel Rodrigues MD as PCP - [facility] Alan Woodall MD as PCP - [facility]  Molly Banks MD (Inactive) as Physician (Ophthalmology)  Garcia Valenzuela MD as Physician (Obstetrics & Gynecology)  Justin Keller MD (Cardiology)      Plan:      No orders of the defined types were placed in this encounter. Health Maintenance   Topic Date Due    GLAUCOMA SCREENING Q2Y  01/15/2019    COLONOSCOPY  03/27/2020    Shingrix Vaccine Age 50> (1 of 2) 02/19/2020 (Originally 5/29/1995)    MEDICARE YEARLY EXAM  11/14/2020    BREAST CANCER SCRN MAMMOGRAM  12/06/2020    DTaP/Tdap/Td series (2 - Td) 05/19/2025    Hepatitis C Screening  Completed    Bone Densitometry (Dexa) Screening  Completed    Influenza Age 5 to Adult  Completed    Pneumococcal 65+ years  Completed       *Patient verbalized understanding and agreement with the plan.   A copy of the After Visit Summary with personalized health plan was given to the patient today. Physical Exam will be performed by PCP and documented under a separate Progress Note.

## 2019-11-14 NOTE — PROGRESS NOTES
HPI:  Tami Miranda is a 76y.o. year old female who returns to clinic today for follow up: hypothyroid, gerd, hypercholesterolemia. 1. Hypothyroid: Thyroid ROS: denies fatigue, weight changes, heat/cold intolerance, bowel/skin changes or CVS symptoms. 2. Hypercholesterolemia: has been following healthy diet. Exercise: yardwork and house work. 3. GERD: Taking medication as needed. Following gerd precautions. 4. HSV skin outbreak often flare with stress about 2 x a year. Valtrex 500mg for flares. 5. Basal cell on nose and schedule for Mohs procedure 19.  6. Osteopenia:  Taking calcium and vit D,  last bone density reviewed. No falls. Current Outpatient Medications   Medication Sig Dispense Refill    levothyroxine (SYNTHROID) 125 mcg tablet Take 1 Tab by mouth Daily (before breakfast). 90 Tab 3    omeprazole (PRILOSEC) 20 mg capsule       CALCIUM CARBONATE/VITAMIN D3 (CALCIUM 600 + D PO) Take  by mouth two (2) times a day.  multivitamins-minerals-lutein (CENTRUM SILVER) Tab Take  by mouth.  ibuprofen (ADVIL) 200 mg tablet Take  by mouth every six (6) hours as needed.  VALTREX 500 mg tablet as needed. Allergies   Allergen Reactions    Indocin [Indomethacin Sodium] Shortness of Breath and Swelling     Ankles & legs    Boniva [Ibandronate] Other (comments)     indigestion    Fosamax [Alendronate] Other (comments)     Indigestion       Social History     Tobacco Use    Smoking status: Former Smoker     Last attempt to quit: 1960     Years since quittin.9    Smokeless tobacco: Never Used   Substance Use Topics    Alcohol use: No         Review of Systems   Constitutional: Negative for fever and malaise/fatigue. HENT: Negative for sore throat. Respiratory: Negative for shortness of breath. Cardiovascular: Negative for chest pain and leg swelling. Gastrointestinal: Negative for abdominal pain and heartburn. Genitourinary: Negative for dysuria.    Neurological: Negative for dizziness and headaches. Physical Exam   Constitutional: She is oriented to person, place, and time. She appears well-developed. No distress. HENT:   Head: Normocephalic and atraumatic. Right Ear: Tympanic membrane and ear canal normal.   Left Ear: Tympanic membrane and ear canal normal.   Nose: Nose normal.   Mouth/Throat: Oropharynx is clear and moist.   Neck: Normal range of motion. Carotid bruit is not present. No thyromegaly present. Cardiovascular: Normal rate, regular rhythm, normal heart sounds and intact distal pulses. No murmur heard. Pulmonary/Chest: Effort normal and breath sounds normal. She has no wheezes. Abdominal: Soft. Bowel sounds are normal. She exhibits no distension and no mass. There is no tenderness. Musculoskeletal: She exhibits no edema. Lymphadenopathy:     She has no cervical adenopathy. Neurological: She is alert and oriented to person, place, and time. No cranial nerve deficit. Psychiatric: She has a normal mood and affect. Nursing note and vitals reviewed. Visit Vitals  /68 (BP 1 Location: Right arm)   Pulse 88   Temp 98 °F (36.7 °C) (Oral)   Resp 16   Ht 5' 8\" (1.727 m)   Wt 139 lb (63 kg)   SpO2 97%   BMI 21.13 kg/m²       Assessment & Plan:    ICD-10-CM ICD-9-CM    1. Acquired hypothyroidism  Continue current medication and check lab work. E03.9 244.9 TSH REFLEX TO T4   2. Osteopenia, unspecified location  Ca vit D and exercise reviewed. M85.80 733.90    3. Gastroesophageal reflux disease without esophagitis  Well controlled, continue current medication prn.  K21.9 530.81    4. Hypercholesterolemia  Lifestyle management. E78.00 272.0 CBC WITH AUTOMATED DIFF      METABOLIC PANEL, COMPREHENSIVE      LIPID PANEL   5. Medicare annual wellness visit, initial  Health maintenance reviewed and updated with patient today at visit. Z00.00 V70.0    6. Screening for alcoholism Z13.39 V79.1    7. Screening for depression Z13.31 V79.0    8.  Advance care planning Z71.89 V65.49    9. Breast cancer screening Z12.39 V76.10 KAYLAN 3D EMMANUEL W MAMMO BI SCREENING INCL CAD   8. Need for vaccination with 13-polyvalent pneumococcal conjugate vaccine Z23 V03.82    11. Postmenopausal Z78.0 V49.81 DEXA BONE DENSITY STUDY AXIAL      Orders Placed This Encounter    DEXA BONE DENSITY STUDY AXIAL    KAYLAN 3D EMMANUEL W MAMMO BI SCREENING INCL CAD    CBC WITH AUTOMATED DIFF    METABOLIC PANEL, COMPREHENSIVE    LIPID PANEL    TSH REFLEX TO T4    valACYclovir (VALTREX) 500 mg tablet    follow up in 1 year for physical.      Advised her to call back or return to office if symptoms worsen/change/persist.  Discussed expected course/resolution/complications of diagnosis in detail with patient. Medication risks/benefits/costs/interactions/alternatives discussed with patient. She was given an after visit summary which includes diagnoses, current medications, & vitals. She expressed understanding with the diagnosis and plan.

## 2019-11-15 LAB
ALBUMIN SERPL-MCNC: 4.5 G/DL (ref 3.5–4.8)
ALBUMIN/GLOB SERPL: 2 {RATIO} (ref 1.2–2.2)
ALP SERPL-CCNC: 85 IU/L (ref 39–117)
ALT SERPL-CCNC: 20 IU/L (ref 0–32)
AST SERPL-CCNC: 22 IU/L (ref 0–40)
BASOPHILS # BLD AUTO: 0 X10E3/UL (ref 0–0.2)
BASOPHILS NFR BLD AUTO: 1 %
BILIRUB SERPL-MCNC: 0.4 MG/DL (ref 0–1.2)
BUN SERPL-MCNC: 11 MG/DL (ref 8–27)
BUN/CREAT SERPL: 13 (ref 12–28)
CALCIUM SERPL-MCNC: 9.9 MG/DL (ref 8.7–10.3)
CHLORIDE SERPL-SCNC: 101 MMOL/L (ref 96–106)
CHOLEST SERPL-MCNC: 224 MG/DL (ref 100–199)
CO2 SERPL-SCNC: 26 MMOL/L (ref 20–29)
CREAT SERPL-MCNC: 0.83 MG/DL (ref 0.57–1)
EOSINOPHIL # BLD AUTO: 0 X10E3/UL (ref 0–0.4)
EOSINOPHIL NFR BLD AUTO: 1 %
ERYTHROCYTE [DISTWIDTH] IN BLOOD BY AUTOMATED COUNT: 13.2 % (ref 12.3–15.4)
GLOBULIN SER CALC-MCNC: 2.3 G/DL (ref 1.5–4.5)
GLUCOSE SERPL-MCNC: 90 MG/DL (ref 65–99)
HCT VFR BLD AUTO: 40.5 % (ref 34–46.6)
HDLC SERPL-MCNC: 59 MG/DL
HGB BLD-MCNC: 13.4 G/DL (ref 11.1–15.9)
IMM GRANULOCYTES # BLD AUTO: 0 X10E3/UL (ref 0–0.1)
IMM GRANULOCYTES NFR BLD AUTO: 0 %
LDLC SERPL CALC-MCNC: 138 MG/DL (ref 0–99)
LYMPHOCYTES # BLD AUTO: 1.1 X10E3/UL (ref 0.7–3.1)
LYMPHOCYTES NFR BLD AUTO: 22 %
MCH RBC QN AUTO: 29.8 PG (ref 26.6–33)
MCHC RBC AUTO-ENTMCNC: 33.1 G/DL (ref 31.5–35.7)
MCV RBC AUTO: 90 FL (ref 79–97)
MONOCYTES # BLD AUTO: 0.5 X10E3/UL (ref 0.1–0.9)
MONOCYTES NFR BLD AUTO: 9 %
NEUTROPHILS # BLD AUTO: 3.4 X10E3/UL (ref 1.4–7)
NEUTROPHILS NFR BLD AUTO: 67 %
PLATELET # BLD AUTO: 294 X10E3/UL (ref 150–450)
POTASSIUM SERPL-SCNC: 5.1 MMOL/L (ref 3.5–5.2)
PROT SERPL-MCNC: 6.8 G/DL (ref 6–8.5)
RBC # BLD AUTO: 4.5 X10E6/UL (ref 3.77–5.28)
SODIUM SERPL-SCNC: 142 MMOL/L (ref 134–144)
TRIGL SERPL-MCNC: 135 MG/DL (ref 0–149)
TSH SERPL DL<=0.005 MIU/L-ACNC: 0.64 UIU/ML (ref 0.45–4.5)
VLDLC SERPL CALC-MCNC: 27 MG/DL (ref 5–40)
WBC # BLD AUTO: 5.1 X10E3/UL (ref 3.4–10.8)

## 2019-11-18 DIAGNOSIS — E03.9 ACQUIRED HYPOTHYROIDISM: ICD-10-CM

## 2019-11-19 RX ORDER — LEVOTHYROXINE SODIUM 125 UG/1
125 TABLET ORAL
Qty: 90 TAB | Refills: 1 | Status: SHIPPED | OUTPATIENT
Start: 2019-11-19 | End: 2020-05-06

## 2020-01-15 ENCOUNTER — HOSPITAL ENCOUNTER (OUTPATIENT)
Dept: MAMMOGRAPHY | Age: 75
Discharge: HOME | End: 2020-01-15
Attending: INTERNAL MEDICINE
Payer: MEDICARE

## 2020-01-15 DIAGNOSIS — Z12.31 VISIT FOR SCREENING MAMMOGRAM: ICD-10-CM

## 2020-01-15 PROCEDURE — 77063 BREAST TOMOSYNTHESIS BI: CPT

## 2020-01-31 DIAGNOSIS — Z78.0 POSTMENOPAUSAL: ICD-10-CM

## 2020-02-19 ENCOUNTER — TELEPHONE (OUTPATIENT)
Dept: INTERNAL MEDICINE CLINIC | Age: 75
End: 2020-02-19

## 2020-05-04 DIAGNOSIS — E03.9 ACQUIRED HYPOTHYROIDISM: ICD-10-CM

## 2020-05-06 RX ORDER — LEVOTHYROXINE SODIUM 125 UG/1
125 TABLET ORAL
Qty: 90 TAB | Refills: 1 | Status: SHIPPED | OUTPATIENT
Start: 2020-05-06 | End: 2020-11-16 | Stop reason: DRUGHIGH

## 2020-10-13 ENCOUNTER — HOSPITAL ENCOUNTER (OUTPATIENT)
Dept: MRI IMAGING | Age: 75
Discharge: HOME | End: 2020-10-13
Payer: MEDICARE

## 2020-10-13 ENCOUNTER — TELEPHONE (OUTPATIENT)
Dept: INTERNAL MEDICINE CLINIC | Age: 75
End: 2020-10-13

## 2020-10-13 ENCOUNTER — HOSPITAL ENCOUNTER (OUTPATIENT)
Dept: LAB | Age: 75
Discharge: HOME | DRG: 065 | End: 2020-10-13
Payer: MEDICARE

## 2020-10-13 ENCOUNTER — OFFICE VISIT (OUTPATIENT)
Dept: INTERNAL MEDICINE CLINIC | Age: 75
End: 2020-10-13
Payer: MEDICARE

## 2020-10-13 VITALS
SYSTOLIC BLOOD PRESSURE: 150 MMHG | WEIGHT: 138 LBS | HEIGHT: 68 IN | HEART RATE: 88 BPM | BODY MASS INDEX: 20.92 KG/M2 | RESPIRATION RATE: 16 BRPM | TEMPERATURE: 97.8 F | DIASTOLIC BLOOD PRESSURE: 88 MMHG | OXYGEN SATURATION: 96 %

## 2020-10-13 DIAGNOSIS — R03.0 ELEVATED BLOOD PRESSURE READING: ICD-10-CM

## 2020-10-13 DIAGNOSIS — R42 LIGHT HEADEDNESS: ICD-10-CM

## 2020-10-13 DIAGNOSIS — H53.9 CHANGE IN VISION: ICD-10-CM

## 2020-10-13 DIAGNOSIS — I49.9 IRREGULAR HEARTBEAT: ICD-10-CM

## 2020-10-13 DIAGNOSIS — E78.00 ELEVATED CHOLESTEROL: ICD-10-CM

## 2020-10-13 DIAGNOSIS — G45.9 TIA (TRANSIENT ISCHEMIC ATTACK): ICD-10-CM

## 2020-10-13 DIAGNOSIS — R29.898 LEG HEAVINESS: ICD-10-CM

## 2020-10-13 DIAGNOSIS — H53.9 CHANGE IN VISION: Primary | ICD-10-CM

## 2020-10-13 DIAGNOSIS — R01.1 HEART MURMUR: ICD-10-CM

## 2020-10-13 PROCEDURE — G8427 DOCREV CUR MEDS BY ELIG CLIN: HCPCS | Performed by: NURSE PRACTITIONER

## 2020-10-13 PROCEDURE — 70544 MR ANGIOGRAPHY HEAD W/O DYE: CPT

## 2020-10-13 PROCEDURE — 1101F PT FALLS ASSESS-DOCD LE1/YR: CPT | Performed by: NURSE PRACTITIONER

## 2020-10-13 PROCEDURE — 85025 COMPLETE CBC W/AUTO DIFF WBC: CPT

## 2020-10-13 PROCEDURE — G8420 CALC BMI NORM PARAMETERS: HCPCS | Performed by: NURSE PRACTITIONER

## 2020-10-13 PROCEDURE — 3017F COLORECTAL CA SCREEN DOC REV: CPT | Performed by: NURSE PRACTITIONER

## 2020-10-13 PROCEDURE — G0463 HOSPITAL OUTPT CLINIC VISIT: HCPCS | Performed by: NURSE PRACTITIONER

## 2020-10-13 PROCEDURE — 80061 LIPID PANEL: CPT

## 2020-10-13 PROCEDURE — G8399 PT W/DXA RESULTS DOCUMENT: HCPCS | Performed by: NURSE PRACTITIONER

## 2020-10-13 PROCEDURE — 80053 COMPREHEN METABOLIC PANEL: CPT

## 2020-10-13 PROCEDURE — G8432 DEP SCR NOT DOC, RNG: HCPCS | Performed by: NURSE PRACTITIONER

## 2020-10-13 PROCEDURE — G8536 NO DOC ELDER MAL SCRN: HCPCS | Performed by: NURSE PRACTITIONER

## 2020-10-13 PROCEDURE — 99214 OFFICE O/P EST MOD 30 MIN: CPT | Performed by: NURSE PRACTITIONER

## 2020-10-13 PROCEDURE — 93005 ELECTROCARDIOGRAM TRACING: CPT | Performed by: NURSE PRACTITIONER

## 2020-10-13 PROCEDURE — 1090F PRES/ABSN URINE INCON ASSESS: CPT | Performed by: NURSE PRACTITIONER

## 2020-10-13 PROCEDURE — 93000 ELECTROCARDIOGRAM COMPLETE: CPT | Performed by: NURSE PRACTITIONER

## 2020-10-13 RX ORDER — TRETINOIN 0.5 MG/G
CREAM TOPICAL
COMMUNITY
Start: 2020-08-25 | End: 2022-04-28

## 2020-10-13 RX ORDER — VALACYCLOVIR HYDROCHLORIDE 500 MG/1
500 TABLET, FILM COATED ORAL DAILY PRN
Status: ON HOLD | COMMUNITY
End: 2020-10-15

## 2020-10-13 NOTE — PROGRESS NOTES
75 yo female with several complaints. For the past week, she has had daily episodes of colored lights flashing in lateral half of vision in OS (is having it right now). She saw Dr. Emeli Wallace, Ophthalmologist at [facility] yesterday without abnormal findings on exam.   She also, for the last week, has had several episodes of heaviness in LLE. She has not noted weakness in the L leg or any other extremity, but usually stays seated until the episode passes. She has a hx of venous malformation in distal foot w/ previous embolization procedure. She also has ongoing low back discomfort for which she may take 200 mg Ibuprofen a day for several days until it calms down. She used to see a Chiropractor and received massage for this, but hasn't been able to do this since her  retired and their insurance changed. She indicates her discomfort is in bilat lumbo-sacral regions. Her  has not noted changes in her speech with the episodes, and she has not noted dysphagia or headache; however, she has felt light-headed several times during the visual abnormality. She had a syncopal episode while in the shower last year, saw Dr. Pillo Good, and had ECHOcardiogram last April.     PE: Elderly WF is alert and ambulates w/o assistance   BP = 150/88   VIOLA; fundi - unremarkable (is having the visual flashing now)   Neck - no carotid bruits   Heart - irregular rhythm; Gr 1-2/6 sys murmur at upper LSB and apex (reports shehas had a heart murmur since childhood)  - EKG shows NSR   Lungs - clear   Abd - no M,T,O   LEs - symmetric strength on resistance testing             LLE - easily palpable DP & PT pulses; no calf tenderness    Imp: Vision change OS   LLE Heaviness   Low Back pain   Possible TIA   Light-headedness   Irregular heartbeat - likely ectopics    Plan: (discussed with Dr. Jennifer Skinner)   MRA of Brain w/contrast   ECHOcardiogram   Labs (including non-fasting lipid panel)   Encouraged her to go to ED if sxs worsen   She is scheduled to see Dr. Buitrago Flatten Nov 12  _______________________________  Expected course of current diagnosed problem(s) as well as expected progression and possible complications, and desired follow up with provider are discussed with patient. Patient is encouraged to be back in touch with any questions or concerns. Patient expresses understanding of plan of care. Patient is given AVS which includes diagnoses, current medications, vitals.

## 2020-10-14 ENCOUNTER — TELEPHONE (OUTPATIENT)
Dept: INTERNAL MEDICINE CLINIC | Age: 75
End: 2020-10-14

## 2020-10-14 ENCOUNTER — HOSPITAL ENCOUNTER (INPATIENT)
Age: 75
LOS: 1 days | Discharge: HOME | DRG: 065 | End: 2020-10-15
Attending: EMERGENCY MEDICINE | Admitting: HOSPITALIST
Payer: MEDICARE

## 2020-10-14 ENCOUNTER — APPOINTMENT (OUTPATIENT)
Dept: CT IMAGING | Age: 75
DRG: 065 | End: 2020-10-14
Attending: RADIOLOGY
Payer: MEDICARE

## 2020-10-14 ENCOUNTER — APPOINTMENT (OUTPATIENT)
Dept: MRI IMAGING | Age: 75
DRG: 065 | End: 2020-10-14
Attending: RADIOLOGY
Payer: MEDICARE

## 2020-10-14 ENCOUNTER — APPOINTMENT (OUTPATIENT)
Dept: GENERAL RADIOLOGY | Age: 75
DRG: 065 | End: 2020-10-14
Attending: EMERGENCY MEDICINE
Payer: MEDICARE

## 2020-10-14 DIAGNOSIS — I63.9 ACUTE ISCHEMIC STROKE (HCC): ICD-10-CM

## 2020-10-14 DIAGNOSIS — I67.2 INTRACRANIAL ATHEROSCLEROSIS: ICD-10-CM

## 2020-10-14 DIAGNOSIS — I63.59 ISCHEMIC CEREBROVASCULAR ACCIDENT (CVA) DUE TO ATHEROSCLEROSIS OF LARGE INTRACRANIAL ARTERY (HCC): ICD-10-CM

## 2020-10-14 LAB
ALBUMIN SERPL-MCNC: 3.8 G/DL (ref 3.5–5)
ALBUMIN SERPL-MCNC: 4.7 G/DL (ref 3.7–4.7)
ALBUMIN/GLOB SERPL: 1.1 {RATIO} (ref 1.1–2.2)
ALBUMIN/GLOB SERPL: 1.8 {RATIO} (ref 1.2–2.2)
ALP SERPL-CCNC: 87 U/L (ref 45–117)
ALP SERPL-CCNC: 97 IU/L (ref 39–117)
ALT SERPL-CCNC: 20 IU/L (ref 0–32)
ALT SERPL-CCNC: 26 U/L (ref 12–78)
ANION GAP SERPL CALC-SCNC: 6 MMOL/L (ref 5–15)
APTT PPP: 26.7 SEC (ref 22.1–32)
AST SERPL-CCNC: 24 IU/L (ref 0–40)
AST SERPL-CCNC: 25 U/L (ref 15–37)
BASOPHILS # BLD AUTO: 0.1 X10E3/UL (ref 0–0.2)
BASOPHILS # BLD: 0.1 K/UL (ref 0–0.1)
BASOPHILS NFR BLD AUTO: 1 %
BASOPHILS NFR BLD: 1 % (ref 0–1)
BILIRUB SERPL-MCNC: 0.3 MG/DL (ref 0.2–1)
BILIRUB SERPL-MCNC: 0.4 MG/DL (ref 0–1.2)
BUN SERPL-MCNC: 11 MG/DL (ref 6–20)
BUN SERPL-MCNC: 9 MG/DL (ref 8–27)
BUN/CREAT SERPL: 11 (ref 12–28)
BUN/CREAT SERPL: 13 (ref 12–20)
CALCIUM SERPL-MCNC: 10.5 MG/DL (ref 8.7–10.3)
CALCIUM SERPL-MCNC: 9.7 MG/DL (ref 8.5–10.1)
CHLORIDE SERPL-SCNC: 100 MMOL/L (ref 96–106)
CHLORIDE SERPL-SCNC: 103 MMOL/L (ref 97–108)
CHOLEST SERPL-MCNC: 218 MG/DL (ref 100–199)
CO2 SERPL-SCNC: 27 MMOL/L (ref 20–29)
CO2 SERPL-SCNC: 29 MMOL/L (ref 21–32)
COMMENT, HOLDF: NORMAL
CREAT SERPL-MCNC: 0.8 MG/DL (ref 0.57–1)
CREAT SERPL-MCNC: 0.83 MG/DL (ref 0.55–1.02)
DIFFERENTIAL METHOD BLD: NORMAL
EOSINOPHIL # BLD AUTO: 0 X10E3/UL (ref 0–0.4)
EOSINOPHIL # BLD: 0 K/UL (ref 0–0.4)
EOSINOPHIL NFR BLD AUTO: 0 %
EOSINOPHIL NFR BLD: 1 % (ref 0–7)
ERYTHROCYTE [DISTWIDTH] IN BLOOD BY AUTOMATED COUNT: 12.7 % (ref 11.7–15.4)
ERYTHROCYTE [DISTWIDTH] IN BLOOD BY AUTOMATED COUNT: 13.7 % (ref 11.5–14.5)
GLOBULIN SER CALC-MCNC: 2.6 G/DL (ref 1.5–4.5)
GLOBULIN SER CALC-MCNC: 3.4 G/DL (ref 2–4)
GLUCOSE SERPL-MCNC: 163 MG/DL (ref 65–100)
GLUCOSE SERPL-MCNC: 95 MG/DL (ref 65–99)
HCT VFR BLD AUTO: 39.3 % (ref 35–47)
HCT VFR BLD AUTO: 40.8 % (ref 34–46.6)
HDLC SERPL-MCNC: 58 MG/DL
HGB BLD-MCNC: 12.6 G/DL (ref 11.5–16)
HGB BLD-MCNC: 13.5 G/DL (ref 11.1–15.9)
IMM GRANULOCYTES # BLD AUTO: 0 K/UL (ref 0–0.04)
IMM GRANULOCYTES # BLD AUTO: 0 X10E3/UL (ref 0–0.1)
IMM GRANULOCYTES NFR BLD AUTO: 0 %
IMM GRANULOCYTES NFR BLD AUTO: 0 % (ref 0–0.5)
INR PPP: 1 (ref 0.9–1.1)
LDLC SERPL CALC-MCNC: 136 MG/DL (ref 0–99)
LYMPHOCYTES # BLD AUTO: 1.4 X10E3/UL (ref 0.7–3.1)
LYMPHOCYTES # BLD: 1.3 K/UL (ref 0.8–3.5)
LYMPHOCYTES NFR BLD AUTO: 22 %
LYMPHOCYTES NFR BLD: 23 % (ref 12–49)
MCH RBC QN AUTO: 29.2 PG (ref 26.6–33)
MCH RBC QN AUTO: 29.5 PG (ref 26–34)
MCHC RBC AUTO-ENTMCNC: 32.1 G/DL (ref 30–36.5)
MCHC RBC AUTO-ENTMCNC: 33.1 G/DL (ref 31.5–35.7)
MCV RBC AUTO: 88 FL (ref 79–97)
MCV RBC AUTO: 92 FL (ref 80–99)
MONOCYTES # BLD AUTO: 0.5 X10E3/UL (ref 0.1–0.9)
MONOCYTES # BLD: 0.4 K/UL (ref 0–1)
MONOCYTES NFR BLD AUTO: 7 %
MONOCYTES NFR BLD: 7 % (ref 5–13)
NEUTROPHILS # BLD AUTO: 4.6 X10E3/UL (ref 1.4–7)
NEUTROPHILS NFR BLD AUTO: 70 %
NEUTS SEG # BLD: 4 K/UL (ref 1.8–8)
NEUTS SEG NFR BLD: 68 % (ref 32–75)
NRBC # BLD: 0 K/UL (ref 0–0.01)
NRBC BLD-RTO: 0 PER 100 WBC
PLATELET # BLD AUTO: 308 K/UL (ref 150–400)
PLATELET # BLD AUTO: 333 X10E3/UL (ref 150–450)
PMV BLD AUTO: 10.1 FL (ref 8.9–12.9)
POTASSIUM SERPL-SCNC: 3.9 MMOL/L (ref 3.5–5.1)
POTASSIUM SERPL-SCNC: 4.6 MMOL/L (ref 3.5–5.2)
PROT SERPL-MCNC: 7.2 G/DL (ref 6.4–8.2)
PROT SERPL-MCNC: 7.3 G/DL (ref 6–8.5)
PROTHROMBIN TIME: 10.9 SEC (ref 9–11.1)
RBC # BLD AUTO: 4.27 M/UL (ref 3.8–5.2)
RBC # BLD AUTO: 4.62 X10E6/UL (ref 3.77–5.28)
SAMPLES BEING HELD,HOLD: NORMAL
SODIUM SERPL-SCNC: 138 MMOL/L (ref 136–145)
SODIUM SERPL-SCNC: 139 MMOL/L (ref 134–144)
THERAPEUTIC RANGE,PTTT: NORMAL SECS (ref 58–77)
TRIGL SERPL-MCNC: 134 MG/DL (ref 0–149)
VLDLC SERPL CALC-MCNC: 24 MG/DL (ref 5–40)
WBC # BLD AUTO: 5.9 K/UL (ref 3.6–11)
WBC # BLD AUTO: 6.6 X10E3/UL (ref 3.4–10.8)

## 2020-10-14 PROCEDURE — 85025 COMPLETE CBC W/AUTO DIFF WBC: CPT

## 2020-10-14 PROCEDURE — 74011000636 HC RX REV CODE- 636: Performed by: EMERGENCY MEDICINE

## 2020-10-14 PROCEDURE — 71046 X-RAY EXAM CHEST 2 VIEWS: CPT

## 2020-10-14 PROCEDURE — 36415 COLL VENOUS BLD VENIPUNCTURE: CPT

## 2020-10-14 PROCEDURE — 65660000000 HC RM CCU STEPDOWN

## 2020-10-14 PROCEDURE — 74011000258 HC RX REV CODE- 258: Performed by: EMERGENCY MEDICINE

## 2020-10-14 PROCEDURE — 70551 MRI BRAIN STEM W/O DYE: CPT

## 2020-10-14 PROCEDURE — 74011250637 HC RX REV CODE- 250/637: Performed by: NURSE PRACTITIONER

## 2020-10-14 PROCEDURE — 70498 CT ANGIOGRAPHY NECK: CPT

## 2020-10-14 PROCEDURE — 80053 COMPREHEN METABOLIC PANEL: CPT

## 2020-10-14 PROCEDURE — 85730 THROMBOPLASTIN TIME PARTIAL: CPT

## 2020-10-14 PROCEDURE — 99285 EMERGENCY DEPT VISIT HI MDM: CPT

## 2020-10-14 PROCEDURE — 70450 CT HEAD/BRAIN W/O DYE: CPT

## 2020-10-14 PROCEDURE — 99223 1ST HOSP IP/OBS HIGH 75: CPT | Performed by: RADIOLOGY

## 2020-10-14 PROCEDURE — 93005 ELECTROCARDIOGRAM TRACING: CPT

## 2020-10-14 PROCEDURE — 85610 PROTHROMBIN TIME: CPT

## 2020-10-14 RX ORDER — ACETAMINOPHEN 650 MG/1
650 SUPPOSITORY RECTAL
Status: DISCONTINUED | OUTPATIENT
Start: 2020-10-14 | End: 2020-10-15 | Stop reason: HOSPADM

## 2020-10-14 RX ORDER — GUAIFENESIN 100 MG/5ML
162 LIQUID (ML) ORAL DAILY
Status: DISCONTINUED | OUTPATIENT
Start: 2020-10-15 | End: 2020-10-15 | Stop reason: HOSPADM

## 2020-10-14 RX ORDER — ACETAMINOPHEN 325 MG/1
650 TABLET ORAL
Status: DISCONTINUED | OUTPATIENT
Start: 2020-10-14 | End: 2020-10-15 | Stop reason: HOSPADM

## 2020-10-14 RX ORDER — SODIUM CHLORIDE 0.9 % (FLUSH) 0.9 %
10 SYRINGE (ML) INJECTION
Status: COMPLETED | OUTPATIENT
Start: 2020-10-14 | End: 2020-10-14

## 2020-10-14 RX ORDER — CLOPIDOGREL BISULFATE 75 MG/1
150 TABLET ORAL
Status: COMPLETED | OUTPATIENT
Start: 2020-10-14 | End: 2020-10-14

## 2020-10-14 RX ORDER — GUAIFENESIN 100 MG/5ML
648 LIQUID (ML) ORAL
Status: COMPLETED | OUTPATIENT
Start: 2020-10-14 | End: 2020-10-14

## 2020-10-14 RX ORDER — ROSUVASTATIN CALCIUM 10 MG/1
20 TABLET, COATED ORAL
Status: DISCONTINUED | OUTPATIENT
Start: 2020-10-14 | End: 2020-10-15 | Stop reason: HOSPADM

## 2020-10-14 RX ORDER — HEPARIN SODIUM 5000 [USP'U]/ML
5000 INJECTION, SOLUTION INTRAVENOUS; SUBCUTANEOUS EVERY 8 HOURS
Status: DISCONTINUED | OUTPATIENT
Start: 2020-10-14 | End: 2020-10-15 | Stop reason: HOSPADM

## 2020-10-14 RX ORDER — LABETALOL HYDROCHLORIDE 5 MG/ML
5 INJECTION, SOLUTION INTRAVENOUS
Status: DISCONTINUED | OUTPATIENT
Start: 2020-10-14 | End: 2020-10-15 | Stop reason: HOSPADM

## 2020-10-14 RX ORDER — CLOPIDOGREL BISULFATE 75 MG/1
75 TABLET ORAL DAILY
Status: DISCONTINUED | OUTPATIENT
Start: 2020-10-15 | End: 2020-10-15 | Stop reason: HOSPADM

## 2020-10-14 RX ORDER — LEVOTHYROXINE SODIUM 125 UG/1
125 TABLET ORAL
Status: DISCONTINUED | OUTPATIENT
Start: 2020-10-15 | End: 2020-10-15 | Stop reason: HOSPADM

## 2020-10-14 RX ADMIN — Medication 10 ML: at 19:55

## 2020-10-14 RX ADMIN — ROSUVASTATIN 20 MG: 10 TABLET, FILM COATED ORAL at 23:42

## 2020-10-14 RX ADMIN — ASPIRIN 648 MG: 81 TABLET, CHEWABLE ORAL at 21:16

## 2020-10-14 RX ADMIN — SODIUM CHLORIDE 100 ML: 900 INJECTION, SOLUTION INTRAVENOUS at 19:55

## 2020-10-14 RX ADMIN — IOPAMIDOL 100 ML: 755 INJECTION, SOLUTION INTRAVENOUS at 19:55

## 2020-10-14 RX ADMIN — CLOPIDOGREL BISULFATE 150 MG: 75 TABLET ORAL at 21:16

## 2020-10-14 NOTE — ED TRIAGE NOTES
Pt was referred here by Dr. Dudley Bailey due to abnormal MRA (stenosis P1) pt reports various vision changes (flickering, swirling). Pt reports intermittent left lower leg weakness and heaviness. Pt denies changes in speech, facial drooping. Pt reports symptoms for  About a week.

## 2020-10-14 NOTE — TELEPHONE ENCOUNTER
Glenn Cobian from [facility] said to let you know Dr. Allen Daniels would recommend patient go to the ER.

## 2020-10-14 NOTE — ED NOTES
Verbal shift change report given to [address] (oncoming nurse) by [address] (offgoing nurse). Report included the following information SBAR, Kardex, ED Summary and MAR.

## 2020-10-14 NOTE — TELEPHONE ENCOUNTER
Juleen Boeck with [facility]'s mri [phone]     Requesting a call back about mri order yesterday, needs a new order. [facility] ordered.

## 2020-10-14 NOTE — ED PROVIDER NOTES
This is a 26-year-old female with a history of herpes with outbreaks on the gluteal region on the right. She has a history of GE reflux and thyroid disease but no other significant history. She states that a week ago Thursday she developed some pain in her upper back and associated with that she had a swirling visual sensation just on the left side. Those symptoms persisted and became more consistent. They were much more severe yesterday and she was seen by her primary physician and had an MRA of the brain done last night. 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 Her physician's office called Dr. Nuvia Carney and she was apparently instructed to come to the hospital.  She denies any headache or other focal deficits. She has had no weakness or numbness. There is been no chest pain, shortness of breath, nausea or vomiting and no GI or  symptoms otherwise noted. Her only complaint presently is the swirling light sensation which became much more brilliant and significant last night into today. Past Medical History:   Diagnosis Date    Cancer ([address] 2016    basal cell skin carcinoma removed.  Gastroesophageal reflux disease without esophagitis 8/18/2016    Herpes     right gluteal outbreaks    Osteoporosis     Thyroid disease     hypothyroidism    Venous malformation 5/19/2015    Left foot and has had embolization procedure.         Past Surgical History:   Procedure Laterality Date    HX DILATION AND CURETTAGE      HX OTHER SURGICAL  03/2015    emobolization of left foot for vascular malformation         Family History:   Problem Relation Age of Onset    Hypertension Mother     Cancer Father     Heart Disease Father         cad    Cancer Sister 72        breast    Breast Cancer Sister 59       Social History     Socioeconomic History    Marital status:  Spouse name: Not on file    Number of children: Not on file    Years of education: Not on file    Highest education level: Not on file   Occupational History    Not on file   Social Needs    Financial resource strain: Not on file    Food insecurity     Worry: Not on file     Inability: Not on file    Transportation needs     Medical: Not on file     Non-medical: Not on file   Tobacco Use    Smoking status: Former Smoker     Last attempt to quit: 1960     Years since quittin.8    Smokeless tobacco: Never Used   Substance and Sexual Activity    Alcohol use: No    Drug use: No    Sexual activity: Yes     Partners: Male     Birth control/protection: None   Lifestyle    Physical activity     Days per week: Not on file     Minutes per session: Not on file    Stress: Not on file   Relationships    Social connections     Talks on phone: Not on file     Gets together: Not on file     Attends Jewish service: Not on file     Active member of club or organization: Not on file     Attends meetings of clubs or organizations: Not on file     Relationship status: Not on file    Intimate partner violence     Fear of current or ex partner: Not on file     Emotionally abused: Not on file     Physically abused: Not on file     Forced sexual activity: Not on file   Other Topics Concern    Not on file   Social History Narrative    Not on file         ALLERGIES: Indocin [indomethacin sodium]; Boniva [ibandronate]; and Fosamax [alendronate]    Review of Systems   Constitutional: Negative for activity change, appetite change and fatigue. HENT: Negative for ear pain, facial swelling, sore throat and trouble swallowing. Eyes: Negative for pain, discharge and visual disturbance. Respiratory: Negative for chest tightness, shortness of breath and wheezing. Cardiovascular: Negative for chest pain and palpitations. Gastrointestinal: Negative for abdominal pain, blood in stool, nausea and vomiting. Genitourinary: Negative for difficulty urinating, flank pain and hematuria. Musculoskeletal: Positive for back pain. Negative for arthralgias, joint swelling, myalgias and neck pain. Skin: Negative for color change and rash. Neurological: Negative for dizziness, weakness, numbness and headaches. Visual symptoms of swirling lights in the left eye. Hematological: Negative for adenopathy. Does not bruise/bleed easily. Psychiatric/Behavioral: Negative for behavioral problems, confusion and sleep disturbance. All other systems reviewed and are negative. Vitals:    10/14/20 1430   BP: (!) 150/64   Pulse: 91   Resp: 18   Temp: 98.3 °F (36.8 °C)   SpO2: 98%   Weight: 62.6 kg (138 lb)   Height: 5' 8\" (1.727 m)            Physical Exam  Vitals signs and nursing note reviewed. Constitutional:       General: She is not in acute distress. Appearance: She is well-developed. HENT:      Head: Normocephalic and atraumatic. Comments: No cranial bruits noted     Nose: Nose normal.   Eyes:      General: No scleral icterus. Conjunctiva/sclera: Conjunctivae normal.      Pupils: Pupils are equal, round, and reactive to light. Neck:      Musculoskeletal: Normal range of motion and neck supple. Thyroid: No thyromegaly. Vascular: No JVD. Trachea: No tracheal deviation. Comments: No carotid bruits noted. Cardiovascular:      Rate and Rhythm: Normal rate and regular rhythm. Heart sounds: Normal heart sounds. No murmur. No friction rub. No gallop. Pulmonary:      Effort: Pulmonary effort is normal. No respiratory distress. Breath sounds: Normal breath sounds. No wheezing or rales. Chest:      Chest wall: No tenderness. Abdominal:      General: Bowel sounds are normal. There is no distension. Palpations: Abdomen is soft. There is no mass. Tenderness: There is no abdominal tenderness. There is no guarding or rebound.    Musculoskeletal: Normal range of motion. General: No tenderness. Lymphadenopathy:      Cervical: No cervical adenopathy. Skin:     General: Skin is warm and dry. Coloration: Skin is not jaundiced or pale. Findings: No erythema or rash. Neurological:      General: No focal deficit present. Mental Status: She is alert and oriented to person, place, and time. Cranial Nerves: No cranial nerve deficit. Coordination: Coordination normal.      Deep Tendon Reflexes: Reflexes are normal and symmetric. Psychiatric:         Behavior: Behavior normal.         Thought Content: Thought content normal.         Judgment: Judgment normal.          MDM  Number of Diagnoses or Management Options     Amount and/or Complexity of Data Reviewed  Clinical lab tests: ordered and reviewed  Tests in the radiology section of CPT®: ordered and reviewed  Decide to obtain previous medical records or to obtain history from someone other than the patient: yes  Review and summarize past medical records: yes  Discuss the patient with other providers: yes  Independent visualization of images, tracings, or specimens: yes    Risk of Complications, Morbidity, and/or Mortality  Presenting problems: high  Diagnostic procedures: high  Management options: high    Patient Progress  Patient progress: stable         Procedures  Consult was placed with Dr. Dannial Merlin with neuro interventional.    5:34 PM I spoken with Dr. Alexander Chua and he is to review the films and will get back to me. Dr. Florentino Salmon is in the OR with the patient. Patient is currently stable. 5:41 PM  Patient and  updated     6:53 PM  Dr. Alexander Chua in to see    ED MD EKG interpretation: Normal sinus rhythm with a rate at 67 beats a minute. Normal axis and normal intervals. No acute ischemic changes noted. Schuyler Adhikari MD    7:39 PM  The patient has been seen by Dr. Alexander Chua and the nurse practitioner. They have ordered additional imaging with CT of the head and neck and an MRI of the head. If there is no bleed or other acute abnormality, they will likely discharge home. We are currently awaiting the studies to be done and resulted prior to the next decision on how to proceed. The patient's CTA and CT failed to demonstrate any acute process. The MRI however shows multiple small areas of  stroke. She will require admission to the hospitalist service. Perfect Serve Consult for Admission  9:51 PM    ED Room Number: LJ16/84  Patient Name and age:  Sid Laws 76 y.o.  female  Working Diagnosis:   1. Ischemic cerebrovascular accident (CVA) due to atherosclerosis of large intracranial artery ([address]    2.  Intracranial atherosclerosis        COVID-19 Suspicion:  no  Sepsis present:  no  Reassessment needed: no  Code Status:  Full Code  Readmission: no  Isolation Requirements:  no  Recommended Level of Care:  telemetry  Department:[facility] - 21   Other:

## 2020-10-14 NOTE — TELEPHONE ENCOUNTER
Discussed MRA finding with Dr. Quentin Pimentel, then with patient. She report OS flickering is continuing. Call placed to Dr. Aliza Arana, Neurointerventionalist.  Patient could not be accomodated for an appointment until 10/19 at the earliest.  Junior Goff at Dr. Sujey Roche office consulted with him, and the decision was made that patient should present to the ED for further care.     Patient was notified and will present to [facility].  ED notified of her potential arrival.

## 2020-10-15 ENCOUNTER — APPOINTMENT (OUTPATIENT)
Dept: NON INVASIVE DIAGNOSTICS | Age: 75
DRG: 065 | End: 2020-10-15
Attending: HOSPITALIST
Payer: MEDICARE

## 2020-10-15 ENCOUNTER — TELEPHONE (OUTPATIENT)
Dept: INTERNAL MEDICINE CLINIC | Age: 75
End: 2020-10-15

## 2020-10-15 ENCOUNTER — DOCUMENTATION ONLY (OUTPATIENT)
Dept: INTERNAL MEDICINE CLINIC | Age: 75
End: 2020-10-15

## 2020-10-15 VITALS
DIASTOLIC BLOOD PRESSURE: 51 MMHG | TEMPERATURE: 98 F | HEIGHT: 68 IN | OXYGEN SATURATION: 98 % | SYSTOLIC BLOOD PRESSURE: 128 MMHG | BODY MASS INDEX: 22.52 KG/M2 | HEART RATE: 79 BPM | WEIGHT: 148.6 LBS | RESPIRATION RATE: 20 BRPM

## 2020-10-15 LAB
ASPIRIN TEST, ASPIRN: 400 ARU
ASPIRIN TEST, ASPIRN: 494 ARU
ATRIAL RATE: 67 BPM
CALCULATED P AXIS, ECG09: 83 DEGREES
CALCULATED R AXIS, ECG10: 82 DEGREES
CALCULATED T AXIS, ECG11: 83 DEGREES
CHOLEST SERPL-MCNC: 173 MG/DL
DIAGNOSIS, 93000: NORMAL
ECHO AV AREA PEAK VELOCITY: 1.66 CM2
ECHO AV AREA PEAK VELOCITY: 1.66 CM2
ECHO AV PEAK GRADIENT: 13.87 MMHG
ECHO AV PEAK VELOCITY: 186.2 CM/S
ECHO LA AREA 4C: 14.4 CM2
ECHO LA MAJOR AXIS: 1.81 CM
ECHO LA MINOR AXIS: 1 CM
ECHO LA VOL 4C: 33.18 ML (ref 22–52)
ECHO LA VOLUME INDEX A4C: 18.42 ML/M2 (ref 16–28)
ECHO LV INTERNAL DIMENSION DIASTOLIC: 3.98 CM (ref 3.9–5.3)
ECHO LV INTERNAL DIMENSION SYSTOLIC: 2.77 CM
ECHO LV IVSD: 0.68 CM (ref 0.6–0.9)
ECHO LV MASS 2D: 76.3 G (ref 67–162)
ECHO LV MASS INDEX 2D: 42.3 G/M2 (ref 43–95)
ECHO LV POSTERIOR WALL DIASTOLIC: 0.7 CM (ref 0.6–0.9)
ECHO LVOT DIAM: 1.79 CM
ECHO LVOT PEAK GRADIENT: 6.02 MMHG
ECHO LVOT PEAK VELOCITY: 122.67 CM/S
ECHO MV A VELOCITY: 118.48 CM/S
ECHO MV E VELOCITY: 113.11 CM/S
ECHO MV E/A RATIO: 0.95
ECHO RV TAPSE: 2.42 CM (ref 1.5–2)
EST. AVERAGE GLUCOSE BLD GHB EST-MCNC: 123 MG/DL
HBA1C MFR BLD: 5.9 % (ref 4–5.6)
HDLC SERPL-MCNC: 59 MG/DL
HDLC SERPL: 2.9 {RATIO} (ref 0–5)
LDLC SERPL CALC-MCNC: 103 MG/DL (ref 0–100)
LIPID PROFILE,FLP: ABNORMAL
P-R INTERVAL, ECG05: 156 MS
P2Y12 PLT RESPONSE,PPPR: 162 PRU (ref 194–418)
P2Y12 PLT RESPONSE,PPPR: 180 PRU (ref 194–418)
Q-T INTERVAL, ECG07: 400 MS
QRS DURATION, ECG06: 88 MS
QTC CALCULATION (BEZET), ECG08: 422 MS
TRIGL SERPL-MCNC: 55 MG/DL (ref ?–150)
VENTRICULAR RATE, ECG03: 67 BPM
VLDLC SERPL CALC-MCNC: 11 MG/DL

## 2020-10-15 PROCEDURE — 85576 BLOOD PLATELET AGGREGATION: CPT

## 2020-10-15 PROCEDURE — 93306 TTE W/DOPPLER COMPLETE: CPT | Performed by: SPECIALIST

## 2020-10-15 PROCEDURE — 97116 GAIT TRAINING THERAPY: CPT | Performed by: PHYSICAL THERAPIST

## 2020-10-15 PROCEDURE — 97161 PT EVAL LOW COMPLEX 20 MIN: CPT | Performed by: PHYSICAL THERAPIST

## 2020-10-15 PROCEDURE — 83036 HEMOGLOBIN GLYCOSYLATED A1C: CPT

## 2020-10-15 PROCEDURE — 80061 LIPID PANEL: CPT

## 2020-10-15 PROCEDURE — 93306 TTE W/DOPPLER COMPLETE: CPT

## 2020-10-15 PROCEDURE — 36415 COLL VENOUS BLD VENIPUNCTURE: CPT

## 2020-10-15 PROCEDURE — 97165 OT EVAL LOW COMPLEX 30 MIN: CPT

## 2020-10-15 PROCEDURE — 74011250637 HC RX REV CODE- 250/637: Performed by: NURSE PRACTITIONER

## 2020-10-15 PROCEDURE — 2709999900 HC NON-CHARGEABLE SUPPLY

## 2020-10-15 PROCEDURE — 74011250637 HC RX REV CODE- 250/637: Performed by: HOSPITALIST

## 2020-10-15 PROCEDURE — 99223 1ST HOSP IP/OBS HIGH 75: CPT | Performed by: PSYCHIATRY & NEUROLOGY

## 2020-10-15 RX ORDER — ROSUVASTATIN CALCIUM 20 MG/1
40 TABLET, COATED ORAL
Qty: 30 TAB | Refills: 0 | Status: SHIPPED | OUTPATIENT
Start: 2020-10-15 | End: 2020-11-06 | Stop reason: SDUPTHER

## 2020-10-15 RX ORDER — GUAIFENESIN 100 MG/5ML
162 LIQUID (ML) ORAL DAILY
Qty: 30 TAB | Refills: 0 | Status: SHIPPED | OUTPATIENT
Start: 2020-10-16 | End: 2021-02-01

## 2020-10-15 RX ORDER — CLOPIDOGREL BISULFATE 75 MG/1
75 TABLET ORAL DAILY
Qty: 30 TAB | Refills: 0 | Status: SHIPPED | OUTPATIENT
Start: 2020-10-16 | End: 2020-11-06 | Stop reason: SDUPTHER

## 2020-10-15 RX ORDER — ROSUVASTATIN CALCIUM 20 MG/1
20 TABLET, COATED ORAL
Qty: 30 TAB | Refills: 0 | Status: SHIPPED | OUTPATIENT
Start: 2020-10-15 | End: 2020-10-15

## 2020-10-15 RX ORDER — ROSUVASTATIN CALCIUM 20 MG/1
40 TABLET, COATED ORAL
Qty: 30 TAB | Refills: 0 | Status: SHIPPED | OUTPATIENT
Start: 2020-10-15 | End: 2020-10-15

## 2020-10-15 RX ADMIN — ASPIRIN 162 MG: 81 TABLET, CHEWABLE ORAL at 09:15

## 2020-10-15 RX ADMIN — LEVOTHYROXINE SODIUM 125 MCG: 0.12 TABLET ORAL at 07:06

## 2020-10-15 RX ADMIN — MULTIPLE VITAMINS W/ MINERALS TAB 1 TABLET: TAB at 09:16

## 2020-10-15 RX ADMIN — CLOPIDOGREL BISULFATE 75 MG: 75 TABLET ORAL at 09:15

## 2020-10-15 NOTE — TELEPHONE ENCOUNTER
Returned call to [facility] - The MRA needed to be WO contrast per radiologist, was originally ordered WITH contrast.   Asked to fax new order to [phone] handwritten not ordered in [facility] as test has already been done.

## 2020-10-15 NOTE — ROUTINE PROCESS
TRANSFER - OUT REPORT: 
 
Verbal report given to SALVADOR Jose(name) on Hoa Enriquez  being transferred to [facility](unit) for routine progression of care Report consisted of patients Situation, Background, Assessment and  
Recommendations(SBAR). Information from the following report(s) SBAR, MAR, Recent Results and Cardiac Rhythm NSR was reviewed with the receiving nurse. Lines:  
Peripheral IV 10/14/20 Left Antecubital (Active) Site Assessment Clean, dry, & intact 10/14/20 1443 Phlebitis Assessment 0 10/14/20 1443 Infiltration Assessment 0 10/14/20 1443 Dressing Status Clean, dry, & intact 10/14/20 1443 Dressing Type Transparent 10/14/20 1443 Opportunity for questions and clarification was provided. Patient transported with: 
Visit Vitals BP (!) 146/78 Pulse 77 Temp 98.2 °F (36.8 °C) Resp 16 Ht 5' 8\" (1.727 m) Wt 62.6 kg (138 lb) SpO2 100% BMI 20.98 kg/m²

## 2020-10-15 NOTE — DISCHARGE INSTRUCTIONS
Discharge Instructions       PATIENT ID: Heavenly Benitez  MRN: [id number]   YOB: 1945    DATE OF ADMISSION: 10/14/2020  4:13 PM    DATE OF DISCHARGE: 10/15/2020    PRIMARY CARE PROVIDER: Valencia Hodgkins, MD     ATTENDING PHYSICIAN: Pat Rose MD  DISCHARGING PROVIDER: Johnathon Moses NP    To contact this individual call [phone] and ask the  to page. If unavailable ask to be transferred the Adult Hospitalist Department. DISCHARGE DIAGNOSES: Subacute ischemic right occipital stroke: due to right P2 stenosis    CONSULTATIONS: IP CONSULT TO NEUROINTERVENTIONAL SURGERY  IP CONSULT TO NEUROLOGY    PROCEDURES/SURGERIES: * No surgery found *    PENDING TEST RESULTS:   At the time of discharge the following test results are still pending: none. FOLLOW UP APPOINTMENTS:   Follow-up Information     Follow up With Specialties Details Why Contact Info    Dajuan Che MD Endovascular Surgical Neuroradiology In 2 weeks To follow up on the scans of your brain [address]  In 1 day To follow up and treat your TIA symptoms [address] [address]  [phone]           ADDITIONAL CARE RECOMMENDATIONS:   You have been deemed stable for discharge and are being discharged home with family. Should you need medication refills beyond what we have prescribed for you, you will need to contact your primary care provider for refills.     Return to the ER or notify your primary care office if you have a fever greater than 101.5 associated with chills, chest pain, or signs and symptoms of a stroke which are:  B E F A S T  -loss of balance, headaches or dizziness  -eyes blurred vision (sudden)  -asymmetrical facial symmetry (side of face droops)  -arms and leg weakness  -slurred speech / difficulty speaking  -if you experience any of these (time to call for an ambulance)      DIET: Cardiac Diet      ACTIVITY: Activity as tolerated and no driving for today        DISCHARGE MEDICATIONS:   See Medication Reconciliation Form    · It is important that you take the medication exactly as they are prescribed. · Keep your medication in the bottles provided by the pharmacist and keep a list of the medication names, dosages, and times to be taken in your wallet. · Do not take other medications without consulting your doctor. NOTIFY YOUR PHYSICIAN FOR ANY OF THE FOLLOWING:   Fever over 101 degrees for 24 hours. Chest pain, shortness of breath, fever, chills, nausea, vomiting, diarrhea, change in mentation, falling, weakness, bleeding. Severe pain or pain not relieved by medications. Or, any other signs or symptoms that you may have questions about. DISPOSITION:  X  Home With:   OT  PT  HH  RN       SNF/Inpatient Rehab/LTAC    Independent/assisted living    Hospice    Other:       Patient Education        Transient Ischemic Attack: Care Instructions  Your Care Instructions     A transient ischemic attack (TIA) is when blood flow to a part of your brain is blocked for a short time. A TIA is like a stroke but usually lasts only a few minutes. A TIA does not cause lasting brain damage. Any vision problems, slurred speech, or other symptoms usually go away in 10 to 20 minutes. But they may last for up to 24 hours. TIAs are often warning signs of a stroke. Some people who have a TIA may have a stroke in the future. A stroke can cause symptoms like those of a TIA. But a stroke causes lasting damage to your brain. You can take steps to help prevent a stroke. One thing you can do is get early treatment. If you have other new symptoms, or if your symptoms do not get better, go back to the emergency room or call your doctor right away. Getting treatment right away may prevent long-term brain damage caused by a stroke.   The doctor has checked you carefully, but problems can develop later. If you notice any problems or new symptoms, get medical treatment right away. Follow-up care is a key part of your treatment and safety. Be sure to make and go to all appointments, and call your doctor if you are having problems. It's also a good idea to know your test results and keep a list of the medicines you take. How can you care for yourself at home? Medicines    · Be safe with medicines. Take your medicines exactly as prescribed. Call your doctor if you think you are having a problem with your medicine.     · If you take a blood thinner, such as aspirin, be sure you get instructions about how to take your medicine safely. Blood thinners can cause serious bleeding problems.     · Call your doctor if you are not able to take your medicines for any reason.     · Do not take any over-the-counter medicines or herbal products without talking to your doctor first.     · If you take birth control pills or hormone therapy, talk to your doctor. Ask if these treatments are right for you. Lifestyle changes    · Do not smoke. If you need help quitting, talk to your doctor about stop-smoking programs and medicines.     · Be active. If your doctor recommends it, get more exercise. Walking is a good choice. Bit by bit, increase the amount you walk every day. Try for at least 30 minutes on most days of the week. You also may want to swim, bike, or do other activities.     · Eat heart-healthy foods. These include fruits, vegetables, high-fiber foods, lean meats, beans, peas, nuts, seeds, and soy products, and foods that are low in sodium, saturated fat, and trans fat.     · Stay at a healthy weight. Lose weight if you need to.     · Limit alcohol to 2 drinks a day for men and 1 drink a day for women. Staying healthy    · Manage other health problems such as diabetes, high blood pressure, and high cholesterol.     · Get the flu vaccine every year. When should you call for help?    Call 911 anytime you think you may need emergency care. For example, call if:    · You have new or worse symptoms of a stroke. These may include:  ? Sudden numbness, tingling, weakness, or loss of movement in your face, arm, or leg, especially on only one side of your body. ? Sudden vision changes. ? Sudden trouble speaking. ? Sudden confusion or trouble understanding simple statements. ? Sudden problems with walking or balance. ? A sudden, severe headache that is different from past headaches. Call 911 even if these symptoms go away in a few minutes.     · You feel like you are having another TIA. Watch closely for changes in your health, and be sure to contact your doctor if you have any problems. Where can you learn more? Go to http://www.ramey.com/  Enter I231 in the search box to learn more about \"Transient Ischemic Attack: Care Instructions. \"  Current as of: March 4, 2020               Content Version: 12.6  © 9462-6795 [employer]. Care instructions adapted under license by [employer] (which disclaims liability or warranty for this information). If you have questions about a medical condition or this instruction, always ask your healthcare professional. [place] [address] any warranty or liability for your use of this information.          Signed:   Vandana Menezes NP  10/15/2020  3:01 PM

## 2020-10-15 NOTE — PROGRESS NOTES
TRANSFER - IN REPORT:    Verbal report received from [address] (name) on Femi Vilchis  being received from [facility] [place] [facility] (unit) for routine progression of care      Report consisted of patients Situation, Background, Assessment and   Recommendations(SBAR). Information from the following report(s) SBAR, Kardex, Intake/Output, MAR, Recent Results, Cardiac Rhythm NSR/SB and Dual Neuro Assessment was reviewed with the receiving nurse. Opportunity for questions and clarification was provided. Assessment completed upon patients arrival to unit and care assumed.

## 2020-10-15 NOTE — PROGRESS NOTES
Neurocritical Care Brief Progress Note:    Labs reviewed:  ARU: 494, therapeutic  PRU: 180, borderline therapeutic    Plan:   -  mg daily   - Plavix 75 mg daily    Sri Brandon NP  Neurocritical Care Nurse Practitioner  [phone]

## 2020-10-15 NOTE — PROGRESS NOTES
Problem: Patient Education: Go to Patient Education Activity  Goal: Patient/Family Education  Outcome: Progressing Towards Goal     Problem: TIA/CVA Stroke: 0-24 hours  Goal: Off Pathway (Use only if patient is Off Pathway)  Outcome: Progressing Towards Goal  Goal: Activity/Safety  Outcome: Progressing Towards Goal  Goal: Consults, if ordered  Outcome: Progressing Towards Goal  Goal: Diagnostic Test/Procedures  Outcome: Progressing Towards Goal  Goal: Nutrition/Diet  Outcome: Progressing Towards Goal  Goal: Discharge Planning  Outcome: Progressing Towards Goal  Goal: Medications  Outcome: Progressing Towards Goal  Goal: Respiratory  Outcome: Progressing Towards Goal  Goal: Treatments/Interventions/Procedures  Outcome: Progressing Towards Goal  Goal: Minimize risk of bleeding post-thrombolytic infusion  Outcome: Progressing Towards Goal  Goal: Monitor for complications post-thrombolytic infusion  Outcome: Progressing Towards Goal  Goal: Psychosocial  Outcome: Progressing Towards Goal  Goal: *Hemodynamically stable  Outcome: Progressing Towards Goal  Goal: *Neurologically stable  Description: Absence of additional neurological deficits    Outcome: Progressing Towards Goal  Goal: *Verbalizes anxiety and depression are reduced or absent  Outcome: Progressing Towards Goal  Goal: *Absence of Signs of Aspiration on Current Diet  Outcome: Progressing Towards Goal  Goal: *Absence of deep venous thrombosis signs and symptoms(Stroke Metric)  Outcome: Progressing Towards Goal  Goal: *Ability to perform ADLs and demonstrates progressive mobility and function  Outcome: Progressing Towards Goal  Goal: *Stroke education started(Stroke Metric)  Outcome: Progressing Towards Goal  Goal: *Dysphagia screen performed(Stroke Metric)  Outcome: Progressing Towards Goal  Goal: *Rehab consulted(Stroke Metric)  Outcome: Progressing Towards Goal     Problem: Ischemic Stroke: Discharge Outcomes  Goal: *Verbalizes anxiety and depression are reduced or absent  Outcome: Progressing Towards Goal  Goal: *Verbalize understanding of risk factor modification(Stroke Metric)  Outcome: Progressing Towards Goal  Goal: *Hemodynamically stable  Outcome: Progressing Towards Goal  Goal: *Absence of aspiration pneumonia  Outcome: Progressing Towards Goal  Goal: *Aware of needed dietary changes  Outcome: Progressing Towards Goal  Goal: *Verbalize understanding of prescribed medications including anti-coagulants, anti-lipid, and/or anti-platelets(Stroke Metric)  Outcome: Progressing Towards Goal  Goal: *Tolerating diet  Outcome: Progressing Towards Goal  Goal: *Aware of follow-up diagnostics related to anticoagulants  Outcome: Progressing Towards Goal  Goal: *Ability to perform ADLs and demonstrates progressive mobility and function  Outcome: Progressing Towards Goal  Goal: *Absence of DVT(Stroke Metric)  Outcome: Progressing Towards Goal  Goal: *Absence of aspiration  Outcome: Progressing Towards Goal  Goal: *Optimal pain control at patient's stated goal  Outcome: Progressing Towards Goal  Goal: *Home safety concerns addressed  Outcome: Progressing Towards Goal  Goal: *Describes available resources and support systems  Outcome: Progressing Towards Goal  Goal: *Verbalizes understanding of activation of EMS(911) for stroke symptoms(Stroke Metric)  Outcome: Progressing Towards Goal  Goal: *Understands and describes signs and symptoms to report to providers(Stroke Metric)  Outcome: Progressing Towards Goal  Goal: *Neurolgocially stable (absence of additional neurological deficits)  Outcome: Progressing Towards Goal  Goal: *Verbalizes importance of follow-up with primary care physician(Stroke Metric)  Outcome: Progressing Towards Goal  Goal: *Smoking cessation discussed,if applicable(Stroke Metric)  Outcome: Progressing Towards Goal  Goal: *Depression screening completed(Stroke Metric)  Outcome: Progressing Towards Goal     Problem: Pain  Goal: *Control of Pain  Outcome: Progressing Towards Goal  Goal: *PALLIATIVE CARE:  Alleviation of Pain  Outcome: Progressing Towards Goal     Problem: Patient Education: Go to Patient Education Activity  Goal: Patient/Family Education  Outcome: Progressing Towards Goal     Problem: Pressure Injury - Risk of  Goal: *Prevention of pressure injury  Description: Document Vicente Scale and appropriate interventions in the flowsheet. Outcome: Progressing Towards Goal  Note: Pressure Injury Interventions:                                            Problem: Patient Education: Go to Patient Education Activity  Goal: Patient/Family Education  Outcome: Progressing Towards Goal     Problem: Falls - Risk of  Goal: *Absence of Falls  Description: Document Carolina Chappell Fall Risk and appropriate interventions in the flowsheet.   Outcome: Progressing Towards Goal     Problem: Patient Education: Go to Patient Education Activity  Goal: Patient/Family Education  Outcome: Progressing Towards Goal

## 2020-10-15 NOTE — ROUTINE PROCESS
Hospital follow-up PCP transitional care appointment has been scheduled with Dr. Adina Ho for Oct 22 @ 11:15AM.  Pending patient discharge.   Royce Blackman, Care Management Specialist.

## 2020-10-15 NOTE — PROGRESS NOTES
Per request of Imaging at [facility], verbal order given to Jhoan Bui for Brain MRA WITHOUT Contrast to replace yesterday's order (WITH Contrast) as Radiologist deemed that a better choice.

## 2020-10-15 NOTE — CONSULTS
Neurointernventional Surgery Consult  Shahab Arredondo NP    Patient: Cheri Isaacs MRN: [id number]  SSN: [id number]    YOB: 1945  Age: 76 y.o. Sex: female      Chief Complaint: Visual disturbance    Subjective:      Cheri Isaacs is a 76 y.o. female with a pmh of hypothyroid and a venous malformation of left foot that was embolized who presented to the ED today due to changes in her vision and weakness to her left leg that started on 10/8/20. She also reports she has had daily episodes of colored lights flashing in lateral half of vision x 1 week for which she saw Dr. Edna Monroe, Ophthalmologist at [facility] 10/12/20, and reports there were no abnormal findings on eye exam. She then saw her PCP yesterday for these symptoms, Dr. Kareen Matthews, who ordered an MRA brain, which was performed last night. MRA brain showed moderate to severe stenosis P1 segment on the right. Dr. Brenna Interiano consulted [facility] outpatient and we advised pt to come to ED for further eval and work-up. In the ED, a CT of her head was performed which was negative for acute process. CTA again demonstrated severe focal stenosis of the right P2 segment. MRI brain showed few small acute infarcts in the right occipital lobe which are likely related to the PCA stenosis. Pt does not smoke cigarettes. At this time, pt reports she feels she is at her baseline and state the weakness in her left leg has resolved. Presently, she denies any numbness, tingling, dizziness, difficulty swallowing or speaking, and headache. Past Medical History:   Diagnosis Date    Cancer ([facility] [address] 2016    basal cell skin carcinoma removed.  Gastroesophageal reflux disease without esophagitis 8/18/2016    Herpes     right gluteal outbreaks    Osteoporosis     Thyroid disease     hypothyroidism    Venous malformation 5/19/2015    Left foot and has had embolization procedure.       Family History   Problem Relation Age of Onset    Hypertension Mother  Cancer Father     Heart Disease Father         cad    Cancer Sister 72        breast    Breast Cancer Sister 59     Social History     Tobacco Use    Smoking status: Former Smoker     Last attempt to quit: 1960     Years since quittin.8    Smokeless tobacco: Never Used   Substance Use Topics    Alcohol use: No      Prior to Admission Medications   Prescriptions Last Dose Informant Patient Reported? Taking? CALCIUM CARBONATE/VITAMIN D3 (CALCIUM 600 + D PO)   Yes No   Sig: Take  by mouth two (2) times a day. ibuprofen (ADVIL) 200 mg tablet   Yes No   Sig: Take  by mouth every six (6) hours as needed. levothyroxine (SYNTHROID) 125 mcg tablet   No No   Sig: Take 1 Tab by mouth Daily (before breakfast). multivitamins-minerals-lutein (CENTRUM SILVER) Tab   Yes No   Sig: Take  by mouth. omeprazole (PRILOSEC) 20 mg capsule   Yes No   tretinoin (RETIN-A) 0.05 % topical cream   Yes No   Sig: Apply  to affected area daily as needed. valACYclovir (VALTREX) 500 mg tablet   Yes No   Sig: Take 500 mg by mouth daily as needed. Facility-Administered Medications: None       Allergies   Allergen Reactions    Indocin [Indomethacin Sodium] Shortness of Breath and Swelling     Ankles & legs    Boniva [Ibandronate] Other (comments)     indigestion    Fosamax [Alendronate] Other (comments)     Indigestion       Review of Systems:  Pertinent items are noted in the History of Present Illness. Objective:     Vitals:    10/14/20 1430 10/14/20 1711 10/14/20 1800   BP: (!) 150/64 (!) 149/50 (!) 145/55   Pulse: 91  73   Resp: 18  15   Temp: 98.3 °F (36.8 °C)     SpO2: 98%  100%   Weight: 138 lb (62.6 kg)     Height: 5' 8\" (1.727 m)        Physical Exam:  GENERAL: Calm, cooperative, NAD  SKIN: Warm, dry, color appropriate for ethnicity. HEART: RRR  LUNGS: CTAB    Neurologic Exam:  Mental Status:  Alert and oriented x 4. Appropriate affect, mood and behavior.        Language:    Normal fluency, repetition, comprehension and naming. Cranial Nerves:   Pupils 4 mm, equal, round and reactive to light. Visual fields full to confrontation. Extraocular movements intact. Facial sensation intact. Full facial strength, no asymmetry. Hearing grossly intact bilaterally. No dysarthria. Tongue protrudes to midline, palate elevates symmetrically. Shoulder shrug 5/5 bilaterally. Motor:    No pronator drift. Bulk and tone normal.      5/5 power in all extremities proximally and distally. No involuntary movements. Sensation:    Sensation intact throughout to light touch. Coordination & Gait: Gait deferred. FTN and HTS intact with no ataxia present. Labs:  Lab Results   Component Value Date/Time    WBC 5.9 10/14/2020 02:42 PM    HGB 12.6 10/14/2020 02:42 PM    HCT 39.3 10/14/2020 02:42 PM    PLATELET 168 14/66/4647 02:42 PM    MCV 92.0 10/14/2020 02:42 PM      Lab Results   Component Value Date/Time    Sodium 138 10/14/2020 02:42 PM    Potassium 3.9 10/14/2020 02:42 PM    Chloride 103 10/14/2020 02:42 PM    CO2 29 10/14/2020 02:42 PM    Anion gap 6 10/14/2020 02:42 PM    Glucose 163 (H) 10/14/2020 02:42 PM    BUN 11 10/14/2020 02:42 PM    Creatinine 0.83 10/14/2020 02:42 PM    BUN/Creatinine ratio 13 10/14/2020 02:42 PM    GFR est AA >60 10/14/2020 02:42 PM    GFR est non-AA >60 10/14/2020 02:42 PM    Calcium 9.7 10/14/2020 02:42 PM     Imaging:  CT Results (maximum last 3): Results from [place] encounter on 10/14/20   CTA HEAD NECK W CONT    Narrative EXAM:  CTA HEAD NECK W CONT    INDICATION:   Right PCA stenosis on MRA yesterday; left visual field  abnormalities    COMPARISON:  MRA head 10/13/2020. CONTRAST:  100 mL of Isovue-370. TECHNIQUE:  Unenhanced  images were obtained to localize the volume for  acquisition.   Multislice helical axial CT angiography was performed from the  aortic arch to the top of the head during uneventful rapid bolus intravenous  contrast administration. Coronal and sagittal reformations and 3D post  processing was performed. CT dose reduction was achieved through use of a  standardized protocol tailored for this examination and automatic exposure  control for dose modulation. FINDINGS:    CTA Head:  There is no evidence of large vessel occlusion or flow-limiting stenosis of the  intracranial internal carotid, anterior cerebral, and middle cerebral arteries. The anterior communicating artery is patent. The ophthalmic arteries are patent. Unchanged severe focal stenosis of the right P2 segment (series 606 image 31). There is no otherwise evidence of large vessel occlusion or flow-limiting  stenosis of the intracranial vertebral arteries, basilar artery, or posterior  cerebral arteries. The posterior communicating arteries are not seen. There is no evidence of aneurysm or vascular malformation. The dural venous  sinuses and deep cerebral venous system are patent. No evidence of abnormal  enhancement on delayed phase images. CTA NECK:  NASCET method was utilized for calculating stenosis. The aortic arch is unremarkable. The common carotid arteries demonstrate no  significant stenosis. Calcific atherosclerosis of the right carotid bifurcation  with mild (less than 50%) stenosis of the proximal right internal carotid  artery. Calcific atherosclerosis of the left carotid bifurcation with mild (less  than 50%) stenosis of the proximal left internal carotid artery. There is a slightly right dominant vertebrobasilar arterial system. The cervical  vertebral arteries are normal in course, size and contour without significant  stenosis. Atrophic thyroid gland. Heterogeneous biapical consolidative opacities, likely  representing scarring. Additional subpleural areas of groundglass opacity in the  left upper lobe, which appear to represent chronic fibrosis/scarring. No acute  fracture or aggressive osseous lesion. Reversal of the cervical lordosis with  grade 1 anterolisthesis of C4 on C5 and C5 on C6. Multilevel degenerative disc  disease and facet arthropathy in the cervical spine. Impression IMPRESSION:     CTA Head:  1. Unchanged severe focal stenosis of the right P2 segment. 2. Otherwise no evidence of significant stenosis or aneurysm. CTA Neck:  1. No evidence of flow-limiting stenosis. 2. Mild stenosis (less than 50% by NASCET criteria) of the proximal bilateral  internal carotid arteries. CT HEAD WO CONT    Narrative EXAM:  CT HEAD WO CONT    INDICATION:   Right PCA stenosis, visual changes for a few days; rule out stroke    COMPARISON: MRA head 10/13/2020. TECHNIQUE: Unenhanced CT of the head was performed using 5 mm images. Brain and  bone windows were generated. CT dose reduction was achieved through use of a  standardized protocol tailored for this examination and automatic exposure  control for dose modulation. FINDINGS:  The ventricles are normal in size and position. Basilar cisterns are patent. No  midline shift. There is no evidence of acute infarct, hemorrhage, or extraaxial  fluid collection. The paranasal sinuses, mastoid air cells, and middle ears are clear. The orbital  contents are within normal limits. There are no significant osseous or  extracranial soft tissue lesions. Impression IMPRESSION:  1. No evidence of acute intracranial abnormality. Assessment:     Hospital Problems  Date Reviewed: 10/13/2020    CVA, Stenosis of posterior cerebral artery        Plan:     1.) PCA stenosis   - As seen on CTA head today, severe focal stenosis of the right P2 segment   - DAPT with ASA+Plavix   - 648 ASA loading dose and 150 mg Plavix loading dose now   - Check ASA assay and P2Y12 four hours after med admin    2.) Subacute ischemic CVA              - Due to #1, as seen on MRI brain today in right occipital lobe   - Lipid panel 10/13/20 showed , not at goal <70.  Start Crestor 40 mg HS              - Neurology consult     I have discussed the diagnosis and the intended plan as seen in the above orders with Dr. Collin Graves, the patient and the primary RN. Patient/family updated on current plan of care and is in agreement. All questions were answered. Thank you for this consult and participating in the care of this patient.   Signed By: Rosalba Leiva NP     October 14, 2020

## 2020-10-15 NOTE — H&P
HISTORY AND PHYSICAL      PCP: Jane Crandall MD  History source: the patient      CC: vision changes      HPI: 76 y.o lady who presents with vision changes. She reports that for the past five days she developed intermittent left visual field loss with persistent black spots in the left visual field. No exacerbating or alleviating factors. Associated symptoms include left leg weakness. No headache, dizziness, unsteadiness, or paresthesias. She saw an ophthalmologist on 10/12 and exam was unremarkable. 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 PMH/PSH:  Past Medical History:   Diagnosis Date    Cancer ([facility] [address] 2016    basal cell skin carcinoma removed.  Gastroesophageal reflux disease without esophagitis 8/18/2016    Herpes     right gluteal outbreaks    Osteoporosis     Thyroid disease     hypothyroidism    Venous malformation 5/19/2015    Left foot and has had embolization procedure. Past Surgical History:   Procedure Laterality Date    HX DILATION AND CURETTAGE      HX OTHER SURGICAL  03/2015    emobolization of left foot for vascular malformation       Home meds:   Prior to Admission medications    Medication Sig Start Date End Date Taking? Authorizing Provider   tretinoin (RETIN-A) 0.05 % topical cream Apply  to affected area daily as needed. 8/25/20   Provider, Historical   valACYclovir (VALTREX) 500 mg tablet Take 500 mg by mouth daily as needed. Provider, Historical   levothyroxine (SYNTHROID) 125 mcg tablet Take 1 Tab by mouth Daily (before breakfast). 5/6/20   Jane Crandall MD   omeprazole (PRILOSEC) 20 mg capsule  8/14/18   Provider, Historical   ibuprofen (ADVIL) 200 mg tablet Take  by mouth every six (6) hours as needed. Provider, Historical   CALCIUM CARBONATE/VITAMIN D3 (CALCIUM 600 + D PO) Take  by mouth two (2) times a day.  2/12/10   Provider, Historical   multivitamins-minerals-lutein (CENTRUM SILVER) Tab Take  by mouth. Provider, Historical       Allergies: Allergies   Allergen Reactions    Indocin [Indomethacin Sodium] Shortness of Breath and Swelling     Ankles & legs    Boniva [Ibandronate] Other (comments)     indigestion    Fosamax [Alendronate] Other (comments)     Indigestion         FH:  Family History   Problem Relation Age of Onset    Hypertension Mother     Cancer Father     Heart Disease Father         cad    Cancer Sister 72        breast    Breast Cancer Sister 59       SH:  Social History     Tobacco Use    Smoking status: Former Smoker     Last attempt to quit: 1960     Years since quittin.8    Smokeless tobacco: Never Used   Substance Use Topics    Alcohol use: No       ROS: A comprehensive review of systems was negative except for that written in the HPI. PHYSICAL EXAM:  Visit Vitals  BP (!) 146/78   Pulse 77   Temp 98.2 °F (36.8 °C)   Resp 16   Ht 5' 8\" (1.727 m)   Wt 62.6 kg (138 lb)   SpO2 100%   BMI 20.98 kg/m²       Gen: NAD, non-toxic  HEENT: anicteric sclerae, normal conjunctiva  Neck: supple, trachea midline, no adenopathy  Heart: RRR, no MRG, no JVD, no peripheral edema  Lungs: CTA b/l, non-labored respirations  Abd: soft, NT, ND, BS+, no organomegaly  Extr: warm  Skin: dry, no rash  Neuro: CN II-XII grossly intact, normal speech, moves all extremities  Psych: normal mood, appropriate affect      Labs/Imaging:  Recent Results (from the past 24 hour(s))   SAMPLES BEING HELD    Collection Time: 10/14/20  2:42 PM   Result Value Ref Range    SAMPLES BEING HELD 1RED,1BLU     COMMENT        Add-on orders for these samples will be processed based on acceptable specimen integrity and analyte stability, which may vary by analyte.    CBC WITH AUTOMATED DIFF    Collection Time: 10/14/20  2:42 PM   Result Value Ref Range    WBC 5.9 3.6 - 11.0 K/uL    RBC 4.27 3.80 - 5.20 M/uL    HGB 12.6 11.5 - 16.0 g/dL    HCT 39.3 35.0 - 47.0 %    MCV 92.0 80.0 - 99.0 FL MCH 29.5 26.0 - 34.0 PG    MCHC 32.1 30.0 - 36.5 g/dL    RDW 13.7 11.5 - 14.5 %    PLATELET 758 050 - 140 K/uL    MPV 10.1 8.9 - 12.9 FL    NRBC 0.0 0  WBC    ABSOLUTE NRBC 0.00 0.00 - 0.01 K/uL    NEUTROPHILS 68 32 - 75 %    LYMPHOCYTES 23 12 - 49 %    MONOCYTES 7 5 - 13 %    EOSINOPHILS 1 0 - 7 %    BASOPHILS 1 0 - 1 %    IMMATURE GRANULOCYTES 0 0.0 - 0.5 %    ABS. NEUTROPHILS 4.0 1.8 - 8.0 K/UL    ABS. LYMPHOCYTES 1.3 0.8 - 3.5 K/UL    ABS. MONOCYTES 0.4 0.0 - 1.0 K/UL    ABS. EOSINOPHILS 0.0 0.0 - 0.4 K/UL    ABS. BASOPHILS 0.1 0.0 - 0.1 K/UL    ABS. IMM. GRANS. 0.0 0.00 - 0.04 K/UL    DF AUTOMATED     METABOLIC PANEL, COMPREHENSIVE    Collection Time: 10/14/20  2:42 PM   Result Value Ref Range    Sodium 138 136 - 145 mmol/L    Potassium 3.9 3.5 - 5.1 mmol/L    Chloride 103 97 - 108 mmol/L    CO2 29 21 - 32 mmol/L    Anion gap 6 5 - 15 mmol/L    Glucose 163 (H) 65 - 100 mg/dL    BUN 11 6 - 20 MG/DL    Creatinine 0.83 0.55 - 1.02 MG/DL    BUN/Creatinine ratio 13 12 - 20      GFR est AA >60 >60 ml/min/1.73m2    GFR est non-AA >60 >60 ml/min/1.73m2    Calcium 9.7 8.5 - 10.1 MG/DL    Bilirubin, total 0.3 0.2 - 1.0 MG/DL    ALT (SGPT) 26 12 - 78 U/L    AST (SGOT) 25 15 - 37 U/L    Alk.  phosphatase 87 45 - 117 U/L    Protein, total 7.2 6.4 - 8.2 g/dL    Albumin 3.8 3.5 - 5.0 g/dL    Globulin 3.4 2.0 - 4.0 g/dL    A-G Ratio 1.1 1.1 - 2.2     PROTHROMBIN TIME + INR    Collection Time: 10/14/20  2:42 PM   Result Value Ref Range    INR 1.0 0.9 - 1.1      Prothrombin time 10.9 9.0 - 11.1 sec   PTT    Collection Time: 10/14/20  2:42 PM   Result Value Ref Range    aPTT 26.7 22.1 - 32.0 sec    aPTT, therapeutic range     58.0 - 77.0 SECS   EKG, 12 LEAD, INITIAL    Collection Time: 10/14/20  5:11 PM   Result Value Ref Range    Ventricular Rate 67 BPM    Atrial Rate 67 BPM    P-R Interval 156 ms    QRS Duration 88 ms    Q-T Interval 400 ms    QTC Calculation (Bezet) 422 ms    Calculated P Axis 83 degrees    Calculated R Axis 82 degrees    Calculated T Axis 83 degrees    Diagnosis Normal sinus rhythm  No previous ECGs available          Recent Labs     10/14/20  1442 10/13/20  1315   WBC 5.9 6.6   HGB 12.6 13.5   HCT 39.3 40.8    333     Recent Labs     10/14/20  1442 10/13/20  1315    139   K 3.9 4.6    100   CO2 29 27   BUN 11 9   CREA 0.83 0.80   * 95   CA 9.7 10.5*     Recent Labs     10/14/20  1442 10/13/20  1315   ALT 26 20   AP 87 97   TBILI 0.3 0.4   TP 7.2 7.3   ALB 3.8 4.7   GLOB 3.4  --        No results for input(s): CPK, CKNDX, TROIQ in the last 72 hours. No lab exists for component: CPKMB    Recent Labs     10/14/20  1442   INR 1.0   PTP 10.9   APTT 26.7        No results for input(s): PH, PCO2, PO2 in the last 72 hours. Xr Chest Pa Lat    Result Date: 10/14/2020  IMPRESSION: No acute cardiopulmonary disease. Ct Head Wo Cont    Result Date: 10/14/2020  IMPRESSION: 1. No evidence of acute intracranial abnormality. Cta Head Neck W Cont    Result Date: 10/14/2020  IMPRESSION: CTA Head: 1. Unchanged severe focal stenosis of the right P2 segment. 2. Otherwise no evidence of significant stenosis or aneurysm. CTA Neck: 1. No evidence of flow-limiting stenosis. 2. Mild stenosis (less than 50% by NASCET criteria) of the proximal bilateral internal carotid arteries. Mri Code Neuro Brain Wo Cont    Result Date: 10/14/2020  IMPRESSION: 1. Few small acute infarcts in the right occipital lobe. 23X           Assessment & Plan:     Acute ischemic right occipital stroke: due to right P2 stenosis?  Possible embolic stroke as well.   -monitor on tele  -continue ASA and Plavix  -[facility]IS recs noted  -echocardiogram in the AM; consider CAROLE  -consult neurology  -f/u a1c and lipid panel  -PT/OT evals    DVT ppx: sq heparin  Code status: full  Disposition: home when ready    Signed By: Ry Lewis MD     October 14, 2020

## 2020-10-15 NOTE — PROGRESS NOTES
Admission Medication Reconciliation:    Information obtained from:  patient, rx query, chart review  RxQuery data available¹:  YES    Comments/Recommendations: Updated PTA meds/reviewed patient's allergies. 1)  Patient was a good historian. 2)  Medication changes (since last review): Added  - none    Adjusted  - none    Removed  - valtrex 500 mg as needed     ¹RxQuery pharmacy benefit data reflects medications filled and processed through the patient's insurance, however   this data does NOT capture whether the medication was picked up or is currently being taken by the patient. Allergies:  Indocin [indomethacin sodium]; Boniva [ibandronate]; and Fosamax [alendronate]    Significant PMH/Disease States:   Past Medical History:   Diagnosis Date    Cancer ([facility] [address] 2016    basal cell skin carcinoma removed. Gastroesophageal reflux disease without esophagitis 8/18/2016    Herpes     right gluteal outbreaks    Osteoporosis     Thyroid disease     hypothyroidism    Venous malformation 5/19/2015    Left foot and has had embolization procedure. Chief Complaint for this Admission:    Chief Complaint   Patient presents with    Vision Change    Referral / Consult     Prior to Admission Medications:   Prior to Admission Medications   Prescriptions Last Dose Informant Taking? CALCIUM CARBONATE/VITAMIN D3 (CALCIUM 600 + D PO) 10/13/2020 at Unknown time  Yes   Sig: Take 1 Tab by mouth two (2) times a day. ibuprofen (ADVIL) 200 mg tablet 9/14/2020 at Unknown time  Yes   Sig: Take 200 mg by mouth every six (6) hours as needed. For low back pain. Usually takes once a day for a few days. levothyroxine (SYNTHROID) 125 mcg tablet 10/14/2020 at Unknown time  Yes   Sig: Take 1 Tab by mouth Daily (before breakfast). multivitamins-minerals-lutein (CENTRUM SILVER) Tab 10/14/2020 at Unknown time  Yes   Sig: Take  by mouth.    omeprazole (PRILOSEC) 20 mg capsule 9/14/2020 at Unknown time  Yes   Sig: Take 20 mg by mouth daily as needed (\"if I take advil\"). tretinoin (RETIN-A) 0.05 % topical cream   Yes   Sig: Apply  to affected area daily as needed. Facility-Administered Medications: None       Please contact the main inpatient pharmacy with any questions or concerns at [phone] and we will direct you to the clinical pharmacist covering this patient's care while in-house.    Jean-Paul Aparicio

## 2020-10-15 NOTE — DISCHARGE SUMMARY
Discharge Summary       PATIENT ID: Elisa Landrum  MRN: [id number]   YOB: 1945    DATE OF ADMISSION: 10/14/2020  4:13 PM    DATE OF DISCHARGE: 10/15/2020     PRIMARY CARE PROVIDER: Antonio Frias MD     ATTENDING PHYSICIAN: Kathie Lujan MD  DISCHARGING PROVIDER: Jarred Perez NP    To contact this individual call [phone] and ask the  to page. If unavailable ask to be transferred the Adult Hospitalist Department. CONSULTATIONS: IP CONSULT TO NEUROINTERVENTIONAL SURGERY  IP CONSULT TO NEUROLOGY    PROCEDURES/SURGERIES: * No surgery found *    ADMITTING DIAGNOSES & HOSPITAL COURSE:     Subacute ischemic right occipital stroke: due to right P2 stenosis    This is a 76 y.o lady with a PMH HLD, Hypothyroidism, Osteopenia and Venous Malformation who presents with c/o vision changes. She reports that over the past five days she developed intermittent left visual field loss with persistent black spots in the left visual field. No exacerbating or alleviating factors. Associated symptoms include left leg weakness. No headache, dizziness, unsteadiness, or paresthesias. She saw an ophthalmologist on 10/12 and exam was unremarkable. 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 DISCHARGE DIAGNOSES / PLAN:      Subacute ischemic right occipital stroke: due to right P2 stenosis. -CTA head: severe focal stenosis of the right P2 segment  -telemetry  -continue ASA and Plavix  -ECHO  -consult Neurology   --consult Neuro Sx  -HgA1c and lipid panel  -PT/OT evals  -crestor 40mg called to [address] upon discharge     ADDITIONAL CARE RECOMMENDATIONS:   You have been deemed stable for discharge and are being discharged home with family. Should you need medication refills beyond what we have prescribed for you, you will need to contact your primary care provider for refills.     Return to the ER or notify your primary care office if you have a fever greater than 101.5 associated with chills, chest pain, or signs and symptoms of a stroke which are:  B E F A S T  -loss of balance, headaches or dizziness  -eyes blurred vision (sudden)  -asymmetrical facial symmetry (side of face droops)  -arms and leg weakness  -slurred speech / difficulty speaking  -if you experience any of these (time to call for an ambulance)      PENDING TEST RESULTS:   At the time of discharge the following test results are still pending: none. FOLLOW UP APPOINTMENTS:    Follow-up Information     Follow up With Specialties Details Why Contact Info    Sindhu Foss MD Endovascular Surgical Neuroradiology In 2 weeks To follow up on the scans of your brain [facility] [address]  In 1 day To follow up and treat your TIA symptoms [address]    Vivek Gonzalez MD Internal Medicine Go on 10/22/2020 Hospital follow up appt has been scheduled for Oct 22 @ 11:15AM [address]   [address] [place]  [phone]               DIET: Cardiac Diet      ACTIVITY: Activity as tolerated and no driving for today        DISCHARGE MEDICATIONS:  Discharge Medication List as of 10/15/2020  4:21 PM      START taking these medications    Details   aspirin 81 mg chewable tablet Take 2 Tabs by mouth daily. Indications: stroke prevention, Normal, Disp-30 Tab,R-0      clopidogreL (PLAVIX) 75 mg tab Take 1 Tab by mouth daily. Indications: prevention for a blood clot going to the brain, Normal, Disp-30 Tab,R-0      rosuvastatin (CRESTOR) 20 mg tablet Take 1 Tab by mouth nightly.  Indications: hardening of the arteries due to plaque buildup, Normal, Disp-30 Tab,R-0         CONTINUE these medications which have NOT CHANGED    Details   tretinoin (RETIN-A) 0.05 % topical cream Apply  to affected area daily as needed., Historical Med levothyroxine (SYNTHROID) 125 mcg tablet Take 1 Tab by mouth Daily (before breakfast). , Normal, Disp-90 Tab,R-1      omeprazole (PRILOSEC) 20 mg capsule Take 20 mg by mouth daily as needed (\"if I take advil\"). , Historical Med      CALCIUM CARBONATE/VITAMIN D3 (CALCIUM 600 + D PO) Take 1 Tab by mouth two (2) times a day., Historical Med      multivitamins-minerals-lutein (CENTRUM SILVER) Tab Take  by mouth., Historical Med         STOP taking these medications       ibuprofen (ADVIL) 200 mg tablet Comments:   Reason for Stopping:                 NOTIFY YOUR PHYSICIAN FOR ANY OF THE FOLLOWING:   Fever over 101 degrees for 24 hours. Chest pain, shortness of breath, fever, chills, nausea, vomiting, diarrhea, change in mentation, falling, weakness, bleeding. Severe pain or pain not relieved by medications. Or, any other signs or symptoms that you may have questions about. DISPOSITION:  X  Home With:   OT  PT  HH  RN       Long term SNF/Inpatient Rehab    Independent/assisted living    Hospice    Other:       PATIENT CONDITION AT DISCHARGE:     Functional status    Poor     Deconditioned    X Independent      Cognition    XX Lucid     Forgetful     Dementia      Catheters/lines (plus indication)    Alvarez     PICC     PEG    X None      Code status    X Full code     DNR      PHYSICAL EXAMINATION AT DISCHARGE:    Constitutional:  No acute distress, cooperative, pleasant    ENT:  Oral mucosa moist.    Resp:  CTA bilaterally. No wheezing/rhonchi/rales. No accessory muscle use. CV:  Regular rhythm, normal rate, no murmurs, gallops, rubs. GI:  Soft, non distended, non tender, no guarding, BS present. Musculoskeletal:  No edema, warm, 2+ pulses throughout. Neurologic:  Moves all extremities. AAOx3, CN II-XII reviewed. Skin:  Good turgor, no rashes or ulcers  Psych:  Good insight, Not anxious nor agitated.          CHRONIC MEDICAL DIAGNOSES:  Problem List as of 10/15/2020 Date Reviewed: 10/13/2020 Codes Class Noted - Resolved    Acute ischemic stroke [facility]) ICD-10-CM: I63.9  ICD-9-CM: 434.91  10/14/2020 - Present        Advance directive discussed with patient ICD-10-CM: Z71.89  ICD-9-CM: V65.49  8/18/2016 - Present    Overview Signed 8/18/2016  9:25 AM by Marko Dominique MD     End of life planning discussed with patient. Patient states that they do not have an advance care plan at this time. Information has been provided in today's              Gastroesophageal reflux disease without esophagitis ICD-10-CM: K21.9  ICD-9-CM: 530.81  8/18/2016 - Present        Venous malformation ICD-10-CM: Q27.9  ICD-9-CM: 747.60  5/19/2015 - Present    Overview Signed 7/6/2017 12:13 PM by Marko Dominique MD     Left foot and has had embolization procedure.               Hypercholesterolemia ICD-10-CM: E78.00  ICD-9-CM: 272.0  2/12/2010 - Present        Hypothyroidism ICD-10-CM: E03.9  ICD-9-CM: 244.9  2/12/2010 - Present        Osteopenia ICD-10-CM: M85.80  ICD-9-CM: 733.90  2/12/2010 - Present    Overview Signed 2/18/2011  4:45 PM by Marko Dominique MD     Last reclast 4/10, recent bone density 1/11 osteopenia mild, hold reclast                   Greater than 30 minutes were spent with the patient on counseling and coordination of care    Signed:   Lakisha Magallanes NP  10/15/2020  3:08 PM

## 2020-10-15 NOTE — PROGRESS NOTES
OCCUPATIONAL THERAPY EVALUATION/DISCHARGE  Patient: Bryan Garrido (10 y.o. female)  Date: 10/15/2020  Primary Diagnosis: Acute ischemic stroke [facility]) [I63.9]       Precautions:  None    ASSESSMENT  Based on the objective data described below, the patient presents with L lateral visual field deficits (reports swirling colors); however, is demonstrating Modified [place] with ADL completion, without use of DME and good implementation of lighthouse scanning. Pt also educated pt and  on tub-transfer bench, as well as, use of bright visual field indicator tape, with good understanding verbalized. Pt reports she will independently follow up with vision specialist upon discharge from hospital.  Fugl-Garcia not indicated 2/2 no reports strength/sensation deficits, with good FM/GM coordination noted. Given pt's high level of safe functional independence, good understanding of lighthouse scanning and bright visual field indicator tape, good PRN assist from  available and no DME/AE needs identified, no acute OT needs identified, with OT answering pt's/husbands questions/concerns regarding discharge and both parties thanking therapist for his efforts. Current Level of Function (ADLs/self-care): Mod Indep    Functional Outcome Measure:  Pt scored 90/100 on the Barthel Index Outcome Measure. Other factors to consider for discharge: None     PLAN :  Recommendation for discharge: (in order for the patient to meet his/her long term goals)  No skilled occupational therapy/ follow up rehabilitation needs identified at this time. This discharge recommendation:  Has not yet been discussed the attending provider and/or case management    IF patient discharges home will need the following DME: none       SUBJECTIVE:   Patient stated I plan to go see another eye doctor when I leave here.     OBJECTIVE DATA SUMMARY:   HISTORY:   Past Medical History:   Diagnosis Date    Cancer ([facility] [address] 2016    basal cell skin carcinoma removed.  Gastroesophageal reflux disease without esophagitis 8/18/2016    Herpes     right gluteal outbreaks    Osteoporosis     Thyroid disease     hypothyroidism    Venous malformation 5/19/2015    Left foot and has had embolization procedure. Past Surgical History:   Procedure Laterality Date    HX DILATION AND CURETTAGE      HX OTHER SURGICAL  03/2015    emobolization of left foot for vascular malformation       Prior Level of Function/Environment/Context: Independent, PRN assist from  available  Expanded or extensive additional review of patient history:     Home Situation  Home Environment: Private residence  # Steps to Enter: 2  One/Two Story Residence: (Tri-level)  Living Alone: No  Support Systems: Spouse/Significant Other/Partner  Patient Expects to be Discharged to[de-identified] Private residence  Current DME Used/Available at Home: None  Tub or Shower Type: Tub/Shower combination    Hand dominance: Right    EXAMINATION OF PERFORMANCE DEFICITS:  Cognitive/Behavioral Status:  Neurologic State: Alert  Orientation Level: Oriented X4  Cognition: Appropriate decision making; Appropriate for age attention/concentration; Appropriate safety awareness; Impaired decision making    Hearing: Auditory  Auditory Impairment: None    Vision/Perceptual:    Visual Fields: (L lateral visual field cut; reports colored swirls)  Corrective Lenses: Reading glasses    Range of Motion:  AROM: Within functional limits  PROM: Within functional limits    Strength:  Strength: Within functional limits    Coordination:  Coordination: Within functional limits  Fine Motor Skills-Upper: Left Intact; Right Intact    Gross Motor Skills-Upper: Left Intact; Right Intact    Tone & Sensation:  Tone: Normal  Sensation: Intact       Balance:  Sitting: Intact; Without support  Standing: Intact    Functional Mobility and Transfers for ADLs:  Bed Mobility:  Rolling: Modified independent  Supine to Sit: Modified independent  Sit to Supine: Modified independent  Scooting: Modified independent    Transfers:  Sit to Stand: Modified independent  Stand to Sit: Modified independent  Bed to Chair: Modified independent  Bathroom Mobility: Modified independent  Toilet Transfer : Modified independent    ADL Assessment:  Feeding: Independent    Oral Facial Hygiene/Grooming: Modified Independent    Bathing: Modified independent    Upper Body Dressing: Modified independent    Lower Body Dressing: Modified independent    Toileting: Modified independent    ADL Intervention and task modifications:    Patient instructed and indicated understanding the benefits of maintaining activity tolerance, functional mobility, and independence with self care tasks during acute stay  to ensure safe return home and to baseline. Encouraged patient to increase frequency and duration OOB, be out of bed for all meals, perform daily ADLs (as approved by RN/MD regarding bathing etc), and performing functional mobility to/from bathroom. Educated on functional implementation of lighthouse scanning and bright indicator tape; good understanding verbalized/demonstrated. Functional Measure:  Barthel Index:    Bathin  Bladder: 10  Bowels: 10  Groomin  Dressing: 10  Feeding: 10  Mobility: 10  Stairs: 5  Toilet Use: 10  Transfer (Bed to Chair and Back): 15  Total: 90/100        The Barthel ADL Index: Guidelines  1. The index should be used as a record of what a patient does, not as a record of what a patient could do. 2. The main aim is to establish degree of independence from any help, physical or verbal, however minor and for whatever reason. 3. The need for supervision renders the patient not independent. 4. A patient's performance should be established using the best available evidence. Asking the patient, friends/relatives and nurses are the usual sources, but direct observation and common sense are also important. However direct testing is not needed.   5. Usually the patient's performance over the preceding 24-48 hours is important, but occasionally longer periods will be relevant. 6. Middle categories imply that the patient supplies over 50 per cent of the effort. 7. Use of aids to be independent is allowed. Neftali Balbuena., Barthel, D.W. (9450). Functional evaluation: the Barthel Index. [address] [facility] (14)2. Diego Evans braydon KARINE Dsouza, Karon Stanley., Tomasa Mora., Jonny, [facility] (1999). Measuring the change indisability after inpatient rehabilitation; comparison of the responsiveness of the Barthel Index and Functional Hall Measure. Journal of Neurology, Neurosurgery, and Psychiatry, 66(4), 961-419. Uday Lizama, [place].ALVERTO, ANDI Montes, & Iesha Tovar M.A. (2004.) Assessment of post-stroke quality of life in cost-effectiveness studies: The usefulness of the Barthel Index and the EuroQoL-5D. Quality of Life Research, 15, 554-44     Occupational Therapy Evaluation Charge Determination   History Examination Decision-Making   LOW Complexity : Brief history review  LOW Complexity : 1-3 performance deficits relating to physical, cognitive , or psychosocial skils that result in activity limitations and / or participation restrictions  LOW Complexity : No comorbidities that affect functional and no verbal or physical assistance needed to complete eval tasks       Based on the above components, the patient evaluation is determined to be of the following complexity level: LOW   Pain Rating:  No c/o pain    Activity Tolerance:   Good    After treatment patient left in no apparent distress:    Supine in bed, Call bell within reach and Caregiver / family present    COMMUNICATION/EDUCATION:   The patients plan of care was discussed with: Physical therapist, Registered nurse and Case management. Patient was educated regarding her deficit(s) of Lateral aspects of L visual field  as this relates to her diagnosis of CVA.   She demonstrated Excellent understanding as evidenced by implementation of light-house scanning and pt reporting she will independently follow up with vision specialist.    Patient and/or family was verbally educated on the BE FAST acronym for signs/symptoms of CVA and TIA. BE FAST was written on patient's communication board  for visual education and reinforcement. All questions answered with patient indicating excellent understanding.      Thank you for this referral.  Zofia Oconnell, OT  Time Calculation: 17 mins

## 2020-10-15 NOTE — PROGRESS NOTES
PHYSICAL THERAPY EVALUATION/DISCHARGE  Patient: Anthony Zuniga (69 y.o. female)  Date: 10/15/2020  Primary Diagnosis: Acute ischemic stroke [facility]) [I63.9]       Precautions:   Fall      ASSESSMENT  Based on the objective data described below, the patient presents with near baseline level of function. Patient currently moving well and needs no physical assistance for transfers, gait and bed mobility. Balance appears intact and no overt LOB noted during session. Patient is at her baseline and needs no further PT services needed. Patient is cleared for discharge from PT standpoint:  YES [x]     NO []       Other factors to consider for discharge: none     Further skilled acute physical therapy is not indicated at this time. PLAN :  Recommendation for discharge: (in order for the patient to meet his/her long term goals)  No skilled physical therapy/ follow up rehabilitation needs identified at this time. IF patient discharges home will need the following DME: none       SUBJECTIVE:   Patient stated \"I feel back to normal.    OBJECTIVE DATA SUMMARY:   HISTORY:    Past Medical History:   Diagnosis Date    Cancer ([facility] [address] 2016    basal cell skin carcinoma removed.  Gastroesophageal reflux disease without esophagitis 8/18/2016    Herpes     right gluteal outbreaks    Osteoporosis     Thyroid disease     hypothyroidism    Venous malformation 5/19/2015    Left foot and has had embolization procedure.       Past Surgical History:   Procedure Laterality Date    HX DILATION AND CURETTAGE      HX OTHER SURGICAL  03/2015    emobolization of left foot for vascular malformation       Prior level of function: Independent with mobility at baseline  Personal factors and/or comorbidities impacting plan of care:     Home Situation  Home Environment: Private residence  # Steps to Enter: 2  One/Two Story Residence: (Tri-level)  Living Alone: No  Support Systems: Spouse/Significant Other/Partner  Patient Expects to be Discharged to[de-identified] Private residence  Current DME Used/Available at Home: None  Tub or Shower Type: Tub/Shower combination    EXAMINATION/PRESENTATION/DECISION MAKING:   Critical Behavior:  Neurologic State: Alert  Orientation Level: Oriented X4  Cognition: Appropriate decision making, Appropriate for age attention/concentration, Appropriate safety awareness, Impaired decision making     Hearing: Auditory  Auditory Impairment: None    Range Of Motion:  AROM: Within functional limits           PROM: Within functional limits           Strength:    Strength: Within functional limits                    Tone & Sensation:   Tone: Normal              Sensation: Intact               Coordination:  Coordination: Within functional limits  Vision:   Visual Fields: (L lateral visual field cut; reports colored swirls)  Corrective Lenses: Reading glasses  Functional Mobility:  Bed Mobility:  Rolling: Modified independent  Supine to Sit: Modified independent  Sit to Supine: Modified independent  Scooting: Modified independent  Transfers:  Sit to Stand: Modified independent  Stand to Sit: Modified independent        Bed to Chair: Modified independent              Balance:   Sitting: Intact; Without support  Standing: Intact  Ambulation/Gait Training:  Distance (ft): 150 Feet (ft)  Assistive Device: Gait belt  Ambulation - Level of Assistance: Independent                 Base of Support: Narrowed     Speed/Mariam: Pace decreased (<100 feet/min); Slow       Pain Rating:  No c/o pain    Activity Tolerance:   Good  Please refer to the flowsheet for vital signs taken during this treatment. After treatment patient left in no apparent distress:   Supine in bed and Call bell within reach    COMMUNICATION/EDUCATION:   The patients plan of care was discussed with: Physical therapist, Occupational therapist and Registered nurse.      Fall prevention education was provided and the patient/caregiver indicated understanding., Patient/family have participated as able in goal setting and plan of care. and Patient/family agree to work toward stated goals and plan of care.     Thank you for this referral.  Chinyere Box, PT, DPT   Time Calculation: 18 mins

## 2020-10-15 NOTE — ROUTINE PROCESS
Bedside RN performed patient education and medication education. Discharge concerns initiated and discussed with patient, including clarification on \"who\" assists the patient at their home and instructions for when the home going patient should call their provider after discharge. Opportunity for questions and clarification was provided. Patient receptive to education: YES Patient stated: \"When should I take my aspirin? \" 
Barriers to Education: none Diagnosis Education given:  YES Length of stay: 1 Expected Day of Discharge: 1 Ask if they have \"Help at Home\" & add to white board? YES Education Day #: 1 Medication Education Given:  YES 
M in the box Medication name: aspirin, plavix, crestor Pt aware of HCAHPS survey: YES Stroke Education documented in Patient Education: YES Core Measures Documented in [facility]: 
Risk Factors: YES Warning signs of stroke: YES When to Activate 911: YES Medication Education for Risk Factors: YES Smoking cessation if applicable: YES Written Education Given:  [place] [place] Discharge NIH Completed: YES Score: 1 BRAINS: YES Follow Up Appointment Made: YES Date/Time if applicable: 59/37 at 11

## 2020-10-15 NOTE — PROGRESS NOTES
1556 - Significant Medical Information: See chart notes    Preliminary Discharge Plan/Identified;  Demographic and Primary Care Provider (PCP) Jojo Hatch MD verified and correct. CM will continue to follow discharge planning needs for continuum of care. Fabi Chou RN, CCM    Reason for Admission:  Meagan Bolton is a 76 y.o. admitted to 6S for CVA - SEE HPI. RUR Score: 6 %           Plan for utilizing home health:   Not at this time. PCP: Jojo Hatch MD      Are you a current patient: Yes/No:  Yes    Approximate date of last visit:   UNK                    Current Advanced Directive/Advance Care Plan:  No    CM One Stop/Healthcare Agent :  Michael Nithin [phone]                       Transition of Care Plan:    Home with  of 36 years. Care Management Interventions  PCP Verified by CM: Yes  Mode of Transport at Discharge:  Other (see comment)  MyChart Signup: No  Discharge Durable Medical Equipment: No  Health Maintenance Reviewed: Yes  Physical Therapy Consult: No  Occupational Therapy Consult: No  Speech Therapy Consult: No  Current Support Network: Lives with Spouse  Confirm Follow Up Transport: Family  Discharge Location  Discharge Placement: Home

## 2020-10-15 NOTE — CONSULTS
Neurology Progress  Layo Walker NP      Date of admission: 10/14/2020    Patient: Sina Natarajan MRN: [id number]  SSN: [id number]    YOB: 1945  Age: 76 y.o. Sex: female      Chief complaint: One week of vision disturbance and L leg weakness    Subjective:     HPI: Sina Natarajan is a 76 y.o. female with a pmh of hypothyroid, GERD, HSV, presented 10/14/20 due to changes in her vision and weakness to her left leg that started on 10/8/20. She also reports she has had daily episodes of colored lights flashing in lateral half of vision on the left x 1 week. Ophthalmology found no abnormalities on eye exam. PTA, PCP ordered an MRA brain, which showed moderate to severe stenosis P1 segment on the right. PCP referred to [facility] who told pt to come to ED. In ED, CTH negative for acute process. CTA again demonstrated severe focal stenosis of the right P2 segment. MRI brain showed few small acute infarcts in the right occipital lobe, likely related to the PCA stenosis. Pt reports she feels she is at her baseline and state the weakness in her left leg has resolved, but still has visual disturbance, described as swirling light. Pt not on antiplatelet or anticoagulation. NSR. March 2019 had 24 hr Holter for syncopal episode without concerning findings, thought to be orthostasis. Interval 10/15/20:     Started on ASA and Plavix for stroke prevention given severe R PCA stenosis and small infarcts in the R occipital lobe. Both ASA & Plavix are therapeutic. . A1C 5.9. Past Medical History:   Diagnosis Date    Cancer ([facility] [address] 2016    basal cell skin carcinoma removed.  Gastroesophageal reflux disease without esophagitis 8/18/2016    Herpes     right gluteal outbreaks    Osteoporosis     Thyroid disease     hypothyroidism    Venous malformation 5/19/2015    Left foot and has had embolization procedure.       Past Surgical History:   Procedure Laterality Date    HX DILATION AND CURETTAGE      HX OTHER SURGICAL  2015    emobolization of left foot for vascular malformation      Family History   Problem Relation Age of Onset    Hypertension Mother     Cancer Father     Heart Disease Father         cad    Cancer Sister 72        breast    Breast Cancer Sister 59     Social History     Tobacco Use    Smoking status: Former Smoker     Last attempt to quit: 1960     Years since quittin.8    Smokeless tobacco: Never Used   Substance Use Topics    Alcohol use: No      Prior to Admission medications    Medication Sig Start Date End Date Taking? Authorizing Provider   levothyroxine (SYNTHROID) 125 mcg tablet Take 1 Tab by mouth Daily (before breakfast). 20  Yes Guanaco Calderon MD   omeprazole (PRILOSEC) 20 mg capsule  18  Yes Provider, Historical   ibuprofen (ADVIL) 200 mg tablet Take  by mouth every six (6) hours as needed. Yes Provider, Historical   CALCIUM CARBONATE/VITAMIN D3 (CALCIUM 600 + D PO) Take  by mouth two (2) times a day. 2/12/10  Yes Provider, Historical   multivitamins-minerals-lutein (CENTRUM SILVER) Tab Take  by mouth. Yes Provider, Historical   tretinoin (RETIN-A) 0.05 % topical cream Apply  to affected area daily as needed. 20   Provider, Historical   valACYclovir (VALTREX) 500 mg tablet Take 500 mg by mouth daily as needed.     Provider, Historical     Current Facility-Administered Medications   Medication Dose Route Frequency Provider Last Rate Last Dose    rosuvastatin (CRESTOR) tablet 20 mg  20 mg Oral QHS Roxana Arredondo NP   20 mg at 10/14/20 2342    aspirin chewable tablet 162 mg  162 mg Oral DAILY Jose F Arredondo NP   162 mg at 10/15/20 0915    clopidogreL (PLAVIX) tablet 75 mg  75 mg Oral DAILY Jose F Arredondo NP   75 mg at 10/15/20 0915    acetaminophen (TYLENOL) tablet 650 mg  650 mg Oral Q4H PRN Constance Dorsey MD        Or    acetaminophen (TYLENOL) solution 650 mg  650 mg Per NG tube Q4H PRN Donna Joy MD        Or   Adriana Ruiz acetaminophen (TYLENOL) suppository 650 mg  650 mg Rectal Q4H PRN Quan Joy MD        labetaloL (NORMODYNE;TRANDATE) injection 5 mg  5 mg IntraVENous Q10MIN PRN Zoie Sosa MD        heparin (porcine) injection 5,000 Units  5,000 Units SubCUTAneous Q8H Quan Joy MD        levothyroxine (SYNTHROID) tablet 125 mcg  125 mcg Oral ACB Zioe Sosa MD   125 mcg at 10/15/20 0706    multivitamin, tx-iron-ca-min (THERA-M w/ IRON) tablet 1 Tab  1 Tab Oral DAILY Quan Joy MD   1 Tab at 10/15/20 7112        Allergies   Allergen Reactions    Indocin [Indomethacin Sodium] Shortness of Breath and Swelling     Ankles & legs    Boniva [Ibandronate] Other (comments)     indigestion    Fosamax [Alendronate] Other (comments)     Indigestion         Review of systems  Comprehensive review of systems performed and negative except for as documented above. Objective:     Vitals:    10/14/20 2323 10/15/20 0151 10/15/20 0549 10/15/20 1007   BP: (!) 129/59 (!) 141/57 133/60 (!) 123/47   Pulse: 74 70 83 64   Resp: 18 15 18 23   Temp: 98 °F (36.7 °C) 98 °F (36.7 °C) 98 °F (36.7 °C) 98.2 °F (36.8 °C)   SpO2: 98% 95% 96% 97%        Temp (24hrs), Av.1 °F (36.7 °C), Min:98 °F (36.7 °C), Max:98.3 °F (36.8 °C)        O2 Device: Room air       No intake or output data in the 24 hours ending 10/15/20 1128    General: In NAD. Cardiac: RRR  Lungs: Unlabored breathing. Abdomen: Soft/NT/non-distended. : No Alvarez. Otherwise deferred  Extremities. No edema. Neurologic Exam:  Mental Status:  Alert and oriented x 4. Attentive. Calm. Cooperative. Language:    Normal fluency and comprehension    Cranial Nerves:   Pupils equal, round and reactive to light. Visual fields intact. Extraocular movements intact w/o nystagmus      Facial sensation intact to LT     Facial activation symmetric. Hearing intact bilaterally. No dysarthria. Shoulder shrug 5/5 bilaterally.     Motor: Bulk and tone normal.      5/5 strength in all extremities. No pronator drift. No involuntary movements. Sensation:    Sensation intact throughout to light touch. Coordination & Gait: No ataxia with finger to nose. Gait deferred    LABS:  Recent Labs     10/14/20  1442 10/13/20  1315   WBC 5.9 6.6   HGB 12.6 13.5   HCT 39.3 40.8    333     Recent Labs     10/14/20  1442 10/13/20  1315    139   K 3.9 4.6    100   CO2 29 27   BUN 11 9   CREA 0.83 0.80   * 95   CA 9.7 10.5*     Recent Labs     10/14/20  1442 10/13/20  1315   ALT 26 20   AP 87 97   TBILI 0.3 0.4   TP 7.2 7.3   ALB 3.8 4.7   GLOB 3.4  --      Recent Labs     10/14/20  1442   INR 1.0   PTP 10.9   APTT 26.7        No results for input(s): PHI, PCO2I, PO2I, HCO3I in the last 72 hours. No results for input(s): CPK, CKNDX, TROIQ in the last 72 hours. No lab exists for component: CPKMB  Lab Results   Component Value Date/Time    Cholesterol, total 173 10/15/2020 01:09 AM    HDL Cholesterol 59 10/15/2020 01:09 AM    LDL, calculated 103 (H) 10/15/2020 01:09 AM    Triglyceride 55 10/15/2020 01:09 AM    CHOL/HDL Ratio 2.9 10/15/2020 01:09 AM     No results found for: GLUCPOC  No results found for: COLOR, APPRN, SPGRU, REFSG, FAM, PROTU, GLUCU, KETU, BILU, UROU, SAUD, LEUKU, GLUKE, EPSU, BACTU, WBCU, RBCU, CASTS, UCRY    No results for input(s): FE, TIBC, PSAT, FERR in the last 72 hours. No results found for: FOL, RBCF   No results for input(s): PH, PCO2, PO2 in the last 72 hours. No results for input(s): CPK, CKNDX, TROIQ in the last 72 hours. No lab exists for component: CPKMB    Imaging:  Xr Chest Pa Lat    Result Date: 10/14/2020  IMPRESSION: No acute cardiopulmonary disease. Ct Head Wo Cont    Result Date: 10/14/2020  IMPRESSION: 1. No evidence of acute intracranial abnormality. Cta Head Neck W Cont    Result Date: 10/14/2020  IMPRESSION: CTA Head: 1.  Unchanged severe focal stenosis of the right P2 segment. 2. Otherwise no evidence of significant stenosis or aneurysm. CTA Neck: 1. No evidence of flow-limiting stenosis. 2. Mild stenosis (less than 50% by NASCET criteria) of the proximal bilateral internal carotid arteries. Mri Code Neuro Brain Wo Cont    Result Date: 10/14/2020  IMPRESSION: 1. Few small acute infarcts in the right occipital lobe. 23X       CT Results:  Results from Hospital Encounter encounter on 10/14/20   CTA HEAD NECK W CONT    Narrative EXAM:  CTA HEAD NECK W CONT    INDICATION:   Right PCA stenosis on MRA yesterday; left visual field  abnormalities    COMPARISON:  MRA head 10/13/2020. CONTRAST:  100 mL of Isovue-370. TECHNIQUE:  Unenhanced  images were obtained to localize the volume for  acquisition. Multislice helical axial CT angiography was performed from the  aortic arch to the top of the head during uneventful rapid bolus intravenous  contrast administration. Coronal and sagittal reformations and 3D post  processing was performed. CT dose reduction was achieved through use of a  standardized protocol tailored for this examination and automatic exposure  control for dose modulation. FINDINGS:    CTA Head:  There is no evidence of large vessel occlusion or flow-limiting stenosis of the  intracranial internal carotid, anterior cerebral, and middle cerebral arteries. The anterior communicating artery is patent. The ophthalmic arteries are patent. Unchanged severe focal stenosis of the right P2 segment (series 606 image 31). There is no otherwise evidence of large vessel occlusion or flow-limiting  stenosis of the intracranial vertebral arteries, basilar artery, or posterior  cerebral arteries. The posterior communicating arteries are not seen. There is no evidence of aneurysm or vascular malformation. The dural venous  sinuses and deep cerebral venous system are patent. No evidence of abnormal  enhancement on delayed phase images.     CTA NECK:  NASCET method was utilized for calculating stenosis. The aortic arch is unremarkable. The common carotid arteries demonstrate no  significant stenosis. Calcific atherosclerosis of the right carotid bifurcation  with mild (less than 50%) stenosis of the proximal right internal carotid  artery. Calcific atherosclerosis of the left carotid bifurcation with mild (less  than 50%) stenosis of the proximal left internal carotid artery. There is a slightly right dominant vertebrobasilar arterial system. The cervical  vertebral arteries are normal in course, size and contour without significant  stenosis. Atrophic thyroid gland. Heterogeneous biapical consolidative opacities, likely  representing scarring. Additional subpleural areas of groundglass opacity in the  left upper lobe, which appear to represent chronic fibrosis/scarring. No acute  fracture or aggressive osseous lesion. Reversal of the cervical lordosis with  grade 1 anterolisthesis of C4 on C5 and C5 on C6. Multilevel degenerative disc  disease and facet arthropathy in the cervical spine. Impression IMPRESSION:     CTA Head:  1. Unchanged severe focal stenosis of the right P2 segment. 2. Otherwise no evidence of significant stenosis or aneurysm. CTA Neck:  1. No evidence of flow-limiting stenosis. 2. Mild stenosis (less than 50% by NASCET criteria) of the proximal bilateral  internal carotid arteries. CT HEAD WO CONT    Narrative EXAM:  CT HEAD WO CONT    INDICATION:   Right PCA stenosis, visual changes for a few days; rule out stroke    COMPARISON: MRA head 10/13/2020. TECHNIQUE: Unenhanced CT of the head was performed using 5 mm images. Brain and  bone windows were generated. CT dose reduction was achieved through use of a  standardized protocol tailored for this examination and automatic exposure  control for dose modulation. FINDINGS:  The ventricles are normal in size and position. Basilar cisterns are patent. No  midline shift. There is no evidence of acute infarct, hemorrhage, or extraaxial  fluid collection. The paranasal sinuses, mastoid air cells, and middle ears are clear. The orbital  contents are within normal limits. There are no significant osseous or  extracranial soft tissue lesions. Impression IMPRESSION:  1. No evidence of acute intracranial abnormality. MRI Results:  Results from [place] encounter on 10/14/20   MRI CODE NEURO BRAIN WO CONT    Narrative EXAM:  MRI CODE NEURO BRAIN WO CONT    INDICATION:    Right PCA stenosis; left visual field abnormalities and  intermittent left leg weakness; rule out stroke    COMPARISON:  CTA head neck 10/14/2020, MRA head 10/13/2020. CONTRAST: None. TECHNIQUE:    Multiplanar multisequence acquisition without contrast of the brain. FINDINGS:  Few small acute infarcts in the right occipital lobe. No evidence of acute  hemorrhage. The ventricles are normal in size and position. There is no extra-axial fluid  collection or mass effect. There is no cerebellar tonsillar herniation. Expected  arterial flow-voids are present. The paranasal sinuses, mastoid air cells, and middle ears are clear. The orbital  contents are within normal limits. No significant osseous or scalp lesions are  identified. Impression IMPRESSION:   1. Few small acute infarcts in the right occipital lobe. 23X         Results from [place] encounter on 10/13/20   MRA BRAIN WO CONT    Narrative Clinical history: Change in vision, transient change in vision  INDICATION:   Vision changes  COMPARISON: None  TECHNIQUE:  3-D time-of-flight MRA of the brain was performed. Multiplanar  reconstructions were obtained. FINDINGS:  The A2 segments are within normal limits. There are A1 segments bilaterally. Mild stenosis A1 segment on the right. Petrous and cavernous ICAs are within  normal limits. Mildly hypoplastic on the right. M1 segments are patent.  Mild  stenosis M1 segment on the right. Mild stenosis M1 segment on the left. The left  vertebral artery is smaller than the right vertebral artery. 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 P1 segments on the left are within normal limits. Moderate to severe  stenosis P1/P2 segment on the right. . The basilar artery and its branches are  normal. The internal carotid, anterior cerebral, and middle cerebral arteries  are patent. . There is no aneurysm. There are no sizable posterior communicating  arteries. Impression IMPRESSION:  Moderate to severe stenosis P1 segment on the right. Mild stenosis/hypoplasia of the A1 segment on the right. .    No major vessel occlusion or intracranial aneurysm. XR Results   Results from [place] encounter on 10/14/20   XR CHEST PA LAT    Impression IMPRESSION: No acute cardiopulmonary disease. Results from [place] encounter on 09/04/09   XR LUMBAR SPINE 2 OR 3 VIEWS       VAS/US Results (maximum last 3): No results found for this or any previous visit. TTE  04/08/19   ECHO ADULT COMPLETE 04/08/2019 4/8/2019    Narrative · Calculated left ventricular ejection fraction is 60%. Biplane method   used to measure ejection fraction. No regional wall motion abnormality   noted. Mild (grade 1) left ventricular diastolic dysfunction. · Trace mitral valve regurgitation.         Signed by: Song Montes MD         EKG  Results for orders placed or performed during the hospital encounter of 10/14/20   EKG, 12 LEAD, INITIAL   Result Value Ref Range    Ventricular Rate 67 BPM    Atrial Rate 67 BPM    P-R Interval 156 ms    QRS Duration 88 ms    Q-T Interval 400 ms    QTC Calculation (Bezet) 422 ms    Calculated P Axis 83 degrees    Calculated R Axis 82 degrees    Calculated T Axis 83 degrees    Diagnosis       Normal sinus rhythm  No previous ECGs available  Confirmed by Maria Elena Albarado MD, Antonio Huffman ([id number]) on 10/15/2020 8:54:18 AM         Hospital Problems  Date Reviewed: 10/13/2020          Codes Class Noted POA    Acute ischemic stroke [facility]) ICD-10-CM: I63.9  ICD-9-CM: 434.91  10/14/2020 Unknown              Assessment/Plan:     Collette Blander is a 76 y.o. female with a pmh of hypothyroid, GERD, HSV, presented 10/14/20 due to changes in her vision and weakness to her left leg that started on 10/8/20. She also reports she has had daily episodes of varying visual disturbances from swirls, puffs of smoke, all different colors, sometimes flashing in lateral half of the visual field of her left eye. No visual loss described. Ophthalmology found no abnormalities on eye exam. MRA brain, which showed moderate to severe stenosis P1 segment on the right. CTH negative for acute process. CTA again demonstrated severe focal stenosis of the right P2 segment. MRI brain showed few small acute infarcts in the right occipital lobe, likely related to the PCA stenosis. Pt reports she feels she is at her baseline and state the weakness in her left leg has resolved, but still has visual disturbance as described. Exam non-focal with no vision loss or weakness. Pt was not on antiplatelet or anticoagulation PTA. NSR. Ischemic stroke in the R occipital lobe secondary to severe R PCA stenosis. [facility] recommends medical management with DAPT    Agree with ASA and Plavix. Both are therapeutic. . Increase Rosuvastatin to 40 mg (on 20 mg previously)  BP goal <140/<80  Stroke education        Final Recs to follow from Dr. Price Walsh  Thank you for this consult.     Signed By: Humera Dash NP     October 15, 2020 11:29 AM

## 2020-10-15 NOTE — PROGRESS NOTES
I have reviewed the documentation provided by the nurse practitioner, discussed his findings, clinical impression, and the proposed management plans with regards to this encounter. I have personally evaluated the patient and verified the history and confirmed the physical findings. Below are my additional findings:    76year old male with a h/o HSV, hypothyroidism, BCC, HPL admitted with symptoms of subacute visual abnormalities described as wavy patterns and bright colored shapes in the left eye x 4-5 days. She noted a dull headache as well without N/V. Denies diplopia, dysarthria, aphasia, numbness/paresthesias but has noted 3 episodes of LLE weakness occurring 3-4 days prior lasting <30 seconds. She denies current weakness. No h/o stroke/TIA. She was not taking AP therapy PTA. CT/CTA reviewed with severe stenosis R P2 segment corresponding to acute small R occipital infarct on MRI Brain. Will maintain DAPT and high dose statin therapy for stroke prevention. Goal SBP<140. No driving due to visual deficits. F/U Neurology 3-4 weeks post discharge.       Randee Shetty,   10/15/20

## 2020-10-15 NOTE — PROGRESS NOTES
Bedside shift change report given to Geri Claude, RN (oncoming nurse) by Eddie Dyer RN (offgoing nurse). Report included the following information SBAR, Kardex, Intake/Output, MAR, Recent Results, Cardiac Rhythm NSR/SB and Dual Neuro Assessment.

## 2020-10-22 ENCOUNTER — OFFICE VISIT (OUTPATIENT)
Dept: INTERNAL MEDICINE CLINIC | Age: 75
End: 2020-10-22
Payer: MEDICARE

## 2020-10-22 VITALS
HEART RATE: 82 BPM | OXYGEN SATURATION: 97 % | HEIGHT: 68 IN | TEMPERATURE: 97.8 F | SYSTOLIC BLOOD PRESSURE: 121 MMHG | BODY MASS INDEX: 20.95 KG/M2 | WEIGHT: 138.2 LBS | DIASTOLIC BLOOD PRESSURE: 73 MMHG | RESPIRATION RATE: 18 BRPM

## 2020-10-22 DIAGNOSIS — E78.00 PURE HYPERCHOLESTEROLEMIA: Primary | ICD-10-CM

## 2020-10-22 DIAGNOSIS — Z86.73 HISTORY OF CVA (CEREBROVASCULAR ACCIDENT): ICD-10-CM

## 2020-10-22 DIAGNOSIS — E03.9 ACQUIRED HYPOTHYROIDISM: ICD-10-CM

## 2020-10-22 PROCEDURE — 3017F COLORECTAL CA SCREEN DOC REV: CPT | Performed by: INTERNAL MEDICINE

## 2020-10-22 PROCEDURE — 1111F DSCHRG MED/CURRENT MED MERGE: CPT | Performed by: INTERNAL MEDICINE

## 2020-10-22 PROCEDURE — G8427 DOCREV CUR MEDS BY ELIG CLIN: HCPCS | Performed by: INTERNAL MEDICINE

## 2020-10-22 PROCEDURE — 1101F PT FALLS ASSESS-DOCD LE1/YR: CPT | Performed by: INTERNAL MEDICINE

## 2020-10-22 PROCEDURE — 1090F PRES/ABSN URINE INCON ASSESS: CPT | Performed by: INTERNAL MEDICINE

## 2020-10-22 PROCEDURE — G0463 HOSPITAL OUTPT CLINIC VISIT: HCPCS | Performed by: INTERNAL MEDICINE

## 2020-10-22 PROCEDURE — G8536 NO DOC ELDER MAL SCRN: HCPCS | Performed by: INTERNAL MEDICINE

## 2020-10-22 PROCEDURE — G8399 PT W/DXA RESULTS DOCUMENT: HCPCS | Performed by: INTERNAL MEDICINE

## 2020-10-22 PROCEDURE — G8510 SCR DEP NEG, NO PLAN REQD: HCPCS | Performed by: INTERNAL MEDICINE

## 2020-10-22 PROCEDURE — 99214 OFFICE O/P EST MOD 30 MIN: CPT | Performed by: INTERNAL MEDICINE

## 2020-10-22 PROCEDURE — G8420 CALC BMI NORM PARAMETERS: HCPCS | Performed by: INTERNAL MEDICINE

## 2020-10-22 NOTE — PROGRESS NOTES
Melisa Ocampo is a 76 y.o. female who was seen today for a follow-up visit. Assessment & Plan:     Diagnoses and all orders for this visit:    1. Pure hypercholesterolemia  2. History of CVA (cerebrovascular accident)  With severe focal stenosis of the right P2 segment  3. Acquired hypothyroidism  -     TSH 3RD GENERATION  -     T4, FREE      Follow-up and Dispositions    · Return in about 6 months (around 4/22/2021). Continue with Crestor and patient states she has upcoming appointment with neurology and plans to check her lab work for lipid panel and LFTs with her specialist.  Recommend continue with Plavix and aspirin. lab results and schedule of future lab studies reviewed with patient  cardiovascular risk and specific lipid/LDL goals reviewed. Subjective:   Melisa Ocampo was seen for Transitions Of Care  She presents today for hospital follow-up. She was admitted with changes in her left eye vision. She was diagnosed with a subacute ischemic right occipital stroke due to a right P2 stenosis her CTA showed severe focal stenosis of the right P2 segment she was started on Plavix and is also aspirin. She notes some bruising but no bleeding and is tolerating medication well. She does continue to have on and off some symptoms of feeling of darkness in her left peripheral vision. She denies any vision changes today. She was started on Crestor for cholesterol lowering and is tolerating that medication with no myalgias. Also here for follow-up of hypothyroid and has been taking medication and tolerating well. She is due for lab work. Review of Systems   Constitutional: Negative for malaise/fatigue. Respiratory: Negative for shortness of breath. Cardiovascular: Negative for chest pain and leg swelling. Gastrointestinal: Negative for abdominal pain and heartburn. Neurological: Negative for dizziness, sensory change, speech change, focal weakness and headaches.         Physical Exam  Vitals signs and nursing note reviewed. Constitutional:       General: She is not in acute distress. Appearance: She is well-developed. HENT:      Head: Normocephalic and atraumatic. Neck:      Thyroid: No thyromegaly. Cardiovascular:      Rate and Rhythm: Normal rate and regular rhythm. Pulmonary:      Effort: Pulmonary effort is normal.      Breath sounds: Normal breath sounds. No wheezing. Abdominal:      General: Bowel sounds are normal. There is no distension. Palpations: Abdomen is soft. There is no mass. Tenderness: There is no abdominal tenderness. Musculoskeletal:      Right lower leg: No edema. Left lower leg: No edema. Psychiatric:         Mood and Affect: Mood normal.       Visit Vitals  /73 (BP 1 Location: Right arm, BP Patient Position: Sitting)   Pulse 82   Temp 97.8 °F (36.6 °C) (Temporal)   Resp 18   Ht 5' 8\" (1.727 m)   Wt 138 lb 3.2 oz (62.7 kg)   SpO2 97%   BMI 21.01 kg/m²        Aspects of this note may have been generated using voice recognition software. Despite editing, there may be some syntax errors   I have discussed the diagnosis with the patient and the intended plan as seen in the above orders. The patient has received an after-visit summary and questions were answered concerning future plans. I have discussed any recommended medication side effects and warnings with the patient as well.   She has expressed understanding of the diagnosis and plan    Christel Maldonado MD

## 2020-10-22 NOTE — PATIENT INSTRUCTIONS
Preventing falls (The Basics)Written by the doctors and editors at [employer] Am I at risk of falling?  Your risk of falling increases as you grow older. Thats because getting older can make it harder to walk steadily and keep your balance. Also, the effects of falls are more serious in older people. Overall, 3 to 4 out of every 10 people over the age of 72 fall each year. Up to 76 percent of people who fracture a hip never recover to the point they were before they had their fracture. If you have fallen in the past, you are at higher risk of falling again. Several things can increase your risk of a fall, including: 
Illness A change in the medicines you take An unsafe or unfamiliar setting (for example, a room with rugs or furniture that might trip you, or an area you dont know well) How can my doctor help me to avoid falling?  Your doctor can talk to you about the following things: 
Past falls  It is important to tell your doctor about any times you have fallen or almost fallen. He or she can then suggest ways to prevent another fall. Your health conditions  Some health problems can put you at risk of falling. These include conditions that affect eyesight, hearing, muscle strength, or balance. The medicines you take  Certain medicines can increase the risk of falling. These include some medicines that are used for sleeping problems, anxiety, or depression. Adding new medicines, or changing doses of some medicines, can also affect your risk of falling. The more your doctor knows about your situation, the better he or she will be able to help you. For example, if you fell because you have a condition that causes pain, your doctor might suggest treatments to deal with the pain. Or if 1 of your medicines is making you dizzy and more likely to fall, your doctor might switch you to a different medicine. Is there anything I can do on my own?  Yes. To help keep from falling, you can: 
Make your home safer  To avoid falling at home, get rid of things that might make you trip or slip. This might include furniture, electrical cords, clutter, and loose rugs. Keep your home well lit so that you can easily see where you are going. Avoid storing things in high places so you dont have to reach or climb. Wear sturdy shoes that fit well  Wearing shoes with high heels or slippery soles, or shoes that are too loose can lead to falls. Walking around in bare feet, or only socks, can also increase your risk of falling. Take vitamin D pills  Taking vitamin D might lower the risk of falls in older people. This is because vitamin D helps make bones and muscles stronger. Your doctor can help you decide how much vitamin D to take. Stay active  Exercising on a regular basis can help lower your risk of falling. It is best to do a few different activities that help with both strength and balance. There are many kinds of exercise that can be safe for older people. These include walking, swimming, and Nicho Chi (a [place] martial art that involves slow, gentle movements). Use a cane, walker, and other safety devices  If your doctor recommends that you use a cane or walker, be sure that its the right size and you know how to use it. There are other devices that might help you avoid falling, too. These include grab bars or a sturdy seat for the shower, and hand rails or treads for the stairs (to prevent slipping). If you worry that you could fall, there are also alarm buttons that let you call for help if you fall and cant get up. What should I do if I fall?  If you fall, see your doctor right away, even if you arent hurt. Your doctor can try to figure out what caused you to fall, and how likely you are to fall again. He or she will do an exam and talk to you about your health problems, medicines, and activities. Then he or she can suggest things you can do to avoid falling again. Many older people have a hard time recovering after a fall. Doing things to prevent falling can help you to protect your health and independence. Muscle Conditioning: Exercises Your Care Instructions Here are some examples of exercises for muscle conditioning. Start each exercise slowly. Ease off the exercise if you start to have pain. Your doctor or physical therapist will tell you when you can start these exercises and which ones will work best for you. How to do the exercises Wall push-ups 1. Stand facing a wall, about 12 to 18 inches away. 2. Place your hands on the wall at shoulder height. 3. Slowly bend your elbows and bring your face toward the wall, moving your hips and shoulders forward together. 4. Push slowly back to the starting position. 5. Start with 5 repetitions and work up to 8 to 12. 6. Rest for a minute, and repeat the exercise. Note: When you can do this exercise against a wall comfortably (without your muscles feeling tired), you can try it against a counter. Start with 5 repetitions again and work up to 8 to 12. You can then slowly progress to the end of a couch or a sturdy chair, and finally to the floor. Knee extension 1. While sitting in a chair, straighten one leg and hold while you slowly count to 5. Be sure you do not lock your knee. 2. Repeat 8 to 12 times. 3. Rest for a minute, and repeat the exercise. 4. Do the same exercise with the other leg. Note: If this exercise becomes easy, you can add a light weight around your ankle or tie an elastic resistance band to a chair leg and one ankle. Side-lying leg lift 1. Lie on your side, with your legs extended. Keep your hips straight up and down during this exercise. Do not let your top hip rock toward the back. Support your head with your hand, and place the other hand on the floor near your waist. 
2. Slowly raise your upper leg until it is about in line with your shoulder. Keep your toes pointed forward. 3. Slowly lower your leg to the starting position. 4. Repeat 8 to 12 times. 5. Rest for a minute, and repeat the exercise. 6. Turn to your other side and do the same exercise with your other leg. Note: If this exercise becomes easy, you can add a light weight around your ankle or tie an elastic resistance band to both ankles. Shallow standing knee bends 1. Stand with your hands lightly resting on a counter or chair in front of you with your feet shoulder-width apart. 2. Slowly bend your knees so that you squat down just like you were going to sit in a chair. Make sure your knees do not go in front of your toes. 3. Lower yourself about 6 inches. Your heels should remain on the floor at all times. 4. Rise slowly to a standing position. 5. Repeat 8 to 12 times. 6. Rest for a minute, and repeat the exercise. Follow-up care is a key part of your treatment and safety. Be sure to make and go to all appointments, and call your doctor if you are having problems. It's also a good idea to know your test results and keep a list of the medicines you take.

## 2020-11-02 ENCOUNTER — OFFICE VISIT (OUTPATIENT)
Dept: NEUROSURGERY | Age: 75
End: 2020-11-02
Payer: MEDICARE

## 2020-11-02 VITALS
DIASTOLIC BLOOD PRESSURE: 72 MMHG | BODY MASS INDEX: 20.16 KG/M2 | TEMPERATURE: 97 F | SYSTOLIC BLOOD PRESSURE: 110 MMHG | HEART RATE: 81 BPM | WEIGHT: 133 LBS | OXYGEN SATURATION: 98 % | HEIGHT: 68 IN

## 2020-11-02 DIAGNOSIS — I67.2 INTRACRANIAL ATHEROSCLEROSIS: Primary | ICD-10-CM

## 2020-11-02 PROCEDURE — 1101F PT FALLS ASSESS-DOCD LE1/YR: CPT | Performed by: RADIOLOGY

## 2020-11-02 PROCEDURE — 99213 OFFICE O/P EST LOW 20 MIN: CPT | Performed by: RADIOLOGY

## 2020-11-02 PROCEDURE — G8399 PT W/DXA RESULTS DOCUMENT: HCPCS | Performed by: RADIOLOGY

## 2020-11-02 PROCEDURE — 3017F COLORECTAL CA SCREEN DOC REV: CPT | Performed by: RADIOLOGY

## 2020-11-02 PROCEDURE — 1090F PRES/ABSN URINE INCON ASSESS: CPT | Performed by: RADIOLOGY

## 2020-11-02 PROCEDURE — G8420 CALC BMI NORM PARAMETERS: HCPCS | Performed by: RADIOLOGY

## 2020-11-02 PROCEDURE — G8536 NO DOC ELDER MAL SCRN: HCPCS | Performed by: RADIOLOGY

## 2020-11-02 PROCEDURE — 1111F DSCHRG MED/CURRENT MED MERGE: CPT | Performed by: RADIOLOGY

## 2020-11-02 PROCEDURE — G8427 DOCREV CUR MEDS BY ELIG CLIN: HCPCS | Performed by: RADIOLOGY

## 2020-11-02 PROCEDURE — G8432 DEP SCR NOT DOC, RNG: HCPCS | Performed by: RADIOLOGY

## 2020-11-02 NOTE — PATIENT INSTRUCTIONS
A Healthy Lifestyle: Care Instructions Your Care Instructions A healthy lifestyle can help you feel good, stay at a healthy weight, and have plenty of energy for both work and play. A healthy lifestyle is something you can share with your whole family. A healthy lifestyle also can lower your risk for serious health problems, such as high blood pressure, heart disease, and diabetes. You can follow a few steps listed below to improve your health and the health of your family. Follow-up care is a key part of your treatment and safety. Be sure to make and go to all appointments, and call your doctor if you are having problems. It's also a good idea to know your test results and keep a list of the medicines you take. How can you care for yourself at home? · Do not eat too much sugar, fat, or fast foods. You can still have dessert and treats now and then. The goal is moderation. · Start small to improve your eating habits. Pay attention to portion sizes, drink less juice and soda pop, and eat more fruits and vegetables. ? Eat a healthy amount of food. A 3-ounce serving of meat, for example, is about the size of a deck of cards. Fill the rest of your plate with vegetables and whole grains. ? Limit the amount of soda and sports drinks you have every day. Drink more water when you are thirsty. ? Eat at least 5 servings of fruits and vegetables every day. It may seem like a lot, but it is not hard to reach this goal. A serving or helping is 1 piece of fruit, 1 cup of vegetables, or 2 cups of leafy, raw vegetables. Have an apple or some carrot sticks as an afternoon snack instead of a candy bar. Try to have fruits and/or vegetables at every meal. 
· Make exercise part of your daily routine. You may want to start with simple activities, such as walking, bicycling, or slow swimming. Try to be active 30 to 60 minutes every day.  You do not need to do all 30 to 60 minutes all at once. For example, you can exercise 3 times a day for 10 or 20 minutes. Moderate exercise is safe for most people, but it is always a good idea to talk to your doctor before starting an exercise program. 
· Keep moving. R[employer] the lawn, work in the garden, or [employer]. Take the stairs instead of the elevator at work. · If you smoke, quit. People who smoke have an increased risk for heart attack, stroke, cancer, and other lung illnesses. Quitting is hard, but there are ways to boost your chance of quitting tobacco for good. ? Use nicotine gum, patches, or lozenges. ? Ask your doctor about stop-smoking programs and medicines. ? Keep trying. In addition to reducing your risk of diseases in the future, you will notice some benefits soon after you stop using tobacco. If you have shortness of breath or asthma symptoms, they will likely get better within a few weeks after you quit. · Limit how much alcohol you drink. Moderate amounts of alcohol (up to 2 drinks a day for men, 1 drink a day for women) are okay. But drinking too much can lead to liver problems, high blood pressure, and other health problems. Family health If you have a family, there are many things you can do together to improve your health. · Eat meals together as a family as often as possible. · Eat healthy foods. This includes fruits, vegetables, lean meats and dairy, and whole grains. · Include your family in your fitness plan. Most people think of activities such as jogging or tennis as the way to fitness, but there are many ways you and your family can be more active. Anything that makes you breathe hard and gets your heart pumping is exercise. Here are some tips: 
? Walk to do errands or to take your child to school or the bus. 
? Go for a family bike ride after dinner instead of watching TV. Where can you learn more? Go to http://www.[facility].Nveloped/ Enter N402 in the search box to learn more about \"A Healthy Lifestyle: Care Instructions. \" Current as of: January 31, 2020               Content Version: 12.6 © 7403-8502 [employer]. Care instructions adapted under license by [employer] (which disclaims liability or warranty for this information). If you have questions about a medical condition or this instruction, always ask your healthcare professional. [place] [address] any warranty or liability for your use of this information.

## 2020-11-02 NOTE — PROGRESS NOTES
Neurointerventional Surgery Consult    Patient: Kathy Ivory MRN: [id number]  SSN: [id number]    YOB: 1945  Age: 76 y.o. Sex: female      Subjective:      Kathy Ivory is a 76 y.o. right-handed female with a history of hypothyroidism and venous malformation of the left foot status post embolization who recently suffered a right occipital stroke. Imaging showed severe stenosis of the right P2 segment. Patient was placed on dual antiplatelet therapy and a high intensity statin and was discharged. Patient has been doing well since discharge, she states that her visual symptoms have improved approximately 98%. Previously she was experiencing swallows of color and light flashes and her left visual field. She also had transient weakness in her left leg. Patient denies headaches. She states that she had a recent \"ping\" in the left forehead associated with a flash of light in the left eye. She reports lightheadedness, which occurs mainly when bending over or sitting up from standing. This has been stable since before her stroke. She denies double vision, weakness, numbness and tingling, nausea and vomiting, problems with balance and coordination, and speech problems. Past Medical History:   Diagnosis Date    Cancer ([facility] [address] 2016    basal cell skin carcinoma removed.  Gastroesophageal reflux disease without esophagitis 8/18/2016    Herpes     right gluteal outbreaks    Osteoporosis     Thyroid disease     hypothyroidism    Venous malformation 5/19/2015    Left foot and has had embolization procedure.       Past Surgical History:   Procedure Laterality Date    HX DILATION AND CURETTAGE      HX OTHER SURGICAL  03/2015    emobolization of left foot for vascular malformation      Family History   Problem Relation Age of Onset    Hypertension Mother     Cancer Father     Heart Disease Father         cad    Cancer Sister 72        breast    Breast Cancer Sister 59     Social History     Tobacco Use    Smoking status: Former Smoker     Last attempt to quit: 1960     Years since quittin.8    Smokeless tobacco: Never Used   Substance Use Topics    Alcohol use: No      Current Outpatient Medications   Medication Sig Dispense Refill    valacyclovir HCl (VALTREX PO) Take  by mouth as needed.  aspirin 81 mg chewable tablet Take 2 Tabs by mouth daily. Indications: stroke prevention 30 Tab 0    clopidogreL (PLAVIX) 75 mg tab Take 1 Tab by mouth daily. Indications: prevention for a blood clot going to the brain 30 Tab 0    rosuvastatin (CRESTOR) 20 mg tablet Take 2 Tabs by mouth nightly. Indications: hardening of the arteries due to plaque buildup 30 Tab 0    tretinoin (RETIN-A) 0.05 % topical cream Apply  to affected area daily as needed.  levothyroxine (SYNTHROID) 125 mcg tablet Take 1 Tab by mouth Daily (before breakfast). 90 Tab 1    omeprazole (PRILOSEC) 20 mg capsule Take 20 mg by mouth daily as needed (\"if I take advil\").  CALCIUM CARBONATE/VITAMIN D3 (CALCIUM 600 + D PO) Take 1 Tab by mouth two (2) times a day.  multivitamins-minerals-lutein (CENTRUM SILVER) Tab Take  by mouth. Allergies   Allergen Reactions    Indocin [Indomethacin Sodium] Shortness of Breath and Swelling     Ankles & legs    Boniva [Ibandronate] Other (comments)     indigestion    Fosamax [Alendronate] Other (comments)     Indigestion         Review of Systems:  Pertinent items are noted in the History of Present Illness. Objective: There were no vitals filed for this visit. Physical Exam:  GENERAL: alert, cooperative, no distress, appears stated age  EYE: negative  NECK: no bruit  LUNG: clear to auscultation bilaterally  HEART: regular rate and rhythm, S1, S2 normal, no murmur, click, rub or gallop  EXTREMITIES:  extremities normal, atraumatic, no cyanosis or edema    Neurologic Exam:  Mental Status:  Alert and oriented x 4. Appropriate affect, mood and behavior. Language:    Normal fluency, repetition, comprehension and naming. Cranial Nerves:   Pupils equal, round and reactive to light. Visual fields full to confrontation. Extraocular movements intact. Facial sensation intact V1 - V3. Full facial strength, no asymmetry. Hearing intact bilaterally. No dysarthria. Tongue protrudes to midline, palate elevates symmetrically. Shoulder shrug 5/5 bilaterally. Motor:    No pronator drift. Bulk and tone normal.      5/5 power in all extremities proximally and distally. No involuntary movements. Sensation:    Sensation intact throughout to light touch    Reflexes:    Deferred    Coordination & Gait: Normal.      Imaging:  I personally reviewed the following imaging studies. The impressions listed below are those of the interpreting radiologist(s). MRA brain 10/13/2020: Moderate to severe stenosis P1 segment on the right. Mild stenosis/hypoplasia of the A1 segment on the right. .  No major vessel occlusion or intracranial aneurysm. CT head 10/14/2020:  No evidence of acute intracranial abnormality. CTA head and neck 10/14/2020:  CTA Head:  1. Unchanged severe focal stenosis of the right P2 segment. 2. Otherwise no evidence of significant stenosis or aneurysm. CTA Neck:  1. No evidence of flow-limiting stenosis. 2. Mild stenosis (less than 50% by NASCET criteria) of the proximal bilateral  internal carotid arteries. MRI brain 10/14/2020:  Few small acute infarcts in the right occipital lobe. Assessment:     76 y.o. right-handed female with a history of hypothyroidism and venous malformation of the left foot status post embolization who recently suffered a right occipital stroke and was found to have intracranial atherosclerotic disease with severe stenosis of the right P2 segment. Patient has been doing well since discharge.   Her visual symptoms have nearly completely resolved and she is neurologically intact on exam.  She continues to take her dual antiplatelet therapy and high intensity statin. I advised her to continue taking aspirin 162 mg daily, Plavix 75 mg daily, and Crestor 20 mg daily. We reviewed the data from the SAMMPRIS trial and briefly discussed angioplasty and stenting for intracranial atherosclerotic disease. We discussed the risks and benefits, and I explained that we proceed with medical management first.  If medical management fails, then we will consider neuro intervention. At this time no further neuro interventional surgery follow-up is indicated. She is to continue dual antiplatelet therapy for 3 months, then stop Plavix and continue aspirin and Crestor indefinitely. Patient will follow up with Dr. Ameena Salas in Neurology soon. Plan:     -Continue aspirin 162 mg daily, Plavix 75 mg daily, and Crestor 20 mg daily  -Stop Plavix after 3 months  -Follow-up with Dr. Ameena Salas in Neurology    Thank you for allowing me to participate in the care of this patient. Greater than 30 minutes were spent in patient management, greater than half of which was spent in counseling and coordination of care.         Signed By: Scooby Boothe MD     November 2, 2020

## 2020-11-02 NOTE — PROGRESS NOTES
[facility] follow up  Presenting with s/p Stroke and stenosis. Patient reports one episode of lightheadedness and a flash of light in her left eye. Episode lasted a souple seconds. None since. Reports no residual side effects from stroke. Denies headaches, blurred or double vision, unsteady gait, extremity weakness, n/v, or slurred speech. No acute problems reported.

## 2020-11-05 DIAGNOSIS — I63.9 ACUTE ISCHEMIC STROKE (HCC): ICD-10-CM

## 2020-11-05 DIAGNOSIS — Z51.81 MEDICATION MONITORING ENCOUNTER: Primary | ICD-10-CM

## 2020-11-06 ENCOUNTER — TELEPHONE (OUTPATIENT)
Dept: INTERNAL MEDICINE CLINIC | Age: 75
End: 2020-11-06

## 2020-11-06 RX ORDER — ROSUVASTATIN CALCIUM 20 MG/1
20 TABLET, COATED ORAL
Qty: 30 TAB | Refills: 1 | Status: SHIPPED | OUTPATIENT
Start: 2020-11-06 | End: 2020-12-24 | Stop reason: SDUPTHER

## 2020-11-06 RX ORDER — CLOPIDOGREL BISULFATE 75 MG/1
75 TABLET ORAL DAILY
Qty: 30 TAB | Refills: 1 | Status: SHIPPED | OUTPATIENT
Start: 2020-11-06 | End: 2020-11-17 | Stop reason: DRUGHIGH

## 2020-11-06 NOTE — TELEPHONE ENCOUNTER
Notified patient per provider note. Patient has a call to Dr Ismael Rolle office to schedule appt. Would like a refill for another month in case not able to get appt soon.

## 2020-11-06 NOTE — TELEPHONE ENCOUNTER
Notify pt I would recommend crestor 20 mg as I do not think will need 40 mg. So decrease down to crestor 20 mg daily. I have sent med. Regarding the plavix I can send a refill if you need it but you will need to make an appointment with Dr Sanjuanita Marcelino regarding how long she would want you to take the plavix as you were told to take only 3 months and I am more familiar with keeping patient on plavix when they have had a stroke.

## 2020-11-06 NOTE — TELEPHONE ENCOUNTER
Patient confirmed taking Crestor 40 mg daily, only have 1 week of medication left. Need to know if labs are needed prior to refill. Also will need refill on Plavix.

## 2020-11-11 ENCOUNTER — HOSPITAL ENCOUNTER (OUTPATIENT)
Dept: LAB | Age: 75
Discharge: HOME | End: 2020-11-11
Payer: MEDICARE

## 2020-11-11 PROCEDURE — 84443 ASSAY THYROID STIM HORMONE: CPT

## 2020-11-11 PROCEDURE — 36415 COLL VENOUS BLD VENIPUNCTURE: CPT

## 2020-11-11 PROCEDURE — 84439 ASSAY OF FREE THYROXINE: CPT

## 2020-11-12 ENCOUNTER — TELEPHONE (OUTPATIENT)
Dept: INTERNAL MEDICINE CLINIC | Age: 75
End: 2020-11-12

## 2020-11-12 LAB
T4 FREE SERPL-MCNC: 1.75 NG/DL (ref 0.82–1.77)
TSH SERPL DL<=0.005 MIU/L-ACNC: 0.33 UIU/ML (ref 0.45–4.5)

## 2020-11-13 NOTE — PROGRESS NOTES
Notify patient that TSH is not at goal. Confirm taking medication daily and if so will need to decrease levothyroxine to   112 mcg qam # 90 and #1 RF. Recheck TSH and T4 in 8 weeks.

## 2020-11-16 RX ORDER — LEVOTHYROXINE SODIUM 112 UG/1
112 TABLET ORAL
Qty: 90 TAB | Refills: 1 | Status: SHIPPED | OUTPATIENT
Start: 2020-11-16 | End: 2020-12-28 | Stop reason: DRUGHIGH

## 2020-11-16 NOTE — PROGRESS NOTES
Spoke with patient and she has been taking her medication everyday. Told patient her thyroid was not at goal and that we needed to sent in new rx.  Patient will recheck lab in 8 weeks

## 2020-11-17 ENCOUNTER — OFFICE VISIT (OUTPATIENT)
Dept: NEUROLOGY | Age: 75
End: 2020-11-17
Payer: MEDICARE

## 2020-11-17 VITALS
OXYGEN SATURATION: 98 % | HEIGHT: 68 IN | DIASTOLIC BLOOD PRESSURE: 80 MMHG | RESPIRATION RATE: 16 BRPM | HEART RATE: 82 BPM | SYSTOLIC BLOOD PRESSURE: 125 MMHG | BODY MASS INDEX: 20.92 KG/M2 | WEIGHT: 138 LBS

## 2020-11-17 DIAGNOSIS — I67.2 INTRACRANIAL ATHEROSCLEROSIS: ICD-10-CM

## 2020-11-17 DIAGNOSIS — I63.9 ACUTE ISCHEMIC STROKE (HCC): Primary | ICD-10-CM

## 2020-11-17 DIAGNOSIS — I69.398 VISUAL DISTURBANCE AS COMPLICATION OF STROKE: ICD-10-CM

## 2020-11-17 DIAGNOSIS — H53.9 VISUAL DISTURBANCE AS COMPLICATION OF STROKE: ICD-10-CM

## 2020-11-17 PROCEDURE — 99215 OFFICE O/P EST HI 40 MIN: CPT | Performed by: NURSE PRACTITIONER

## 2020-11-17 RX ORDER — CLOPIDOGREL BISULFATE 75 MG/1
75 TABLET ORAL DAILY
Qty: 30 TAB | Refills: 0 | Status: SHIPPED | OUTPATIENT
Start: 2020-12-10 | End: 2020-12-24 | Stop reason: SINTOL

## 2020-11-17 NOTE — PROGRESS NOTES
Neurology Follow-up  Guerrero Smith NP      Visit Date: November 17, 2020    Patient: Kathy Ivory MRN: [id number]  SSN: [id number]    YOB: 1945  Age: 76 y.o. Sex: female      PCP: Natasha Grace MD      Previous records (physician notes, laboratory reports, and radiology reports) and imaging studies were reviewed and summarized. Subjective:     HPI: Kathy Ivory is a 76 y.o. female with a pmh of hypothyroid, GERD, HSV, presented 10/14/20 to the ED for changes in her vision and self reported 3 episodes of LLE weakness occurring 3-4 days prior to admit lasting <30 seconds. She also reports she has had daily episodes of colored lights flashing in lateral half of vision on the left x 1 week. Ophthalmology found no abnormalities on eye exam. PTA, PCP ordered an MRA brain, which showed moderate to severe stenosis P1 segment on the right. PCP referred to [facility] who told pt to come to ED. In ED, CTH negative for acute process. CTA demonstrated severe focal stenosis of the right P2 segment. MRI brain showed few small acute infarcts in the right occipital lobe, likely related to the PCA stenosis. Pt felt that she had returned to her baseline and that the weakness in her left leg had resolved, but still has visual disturbance, described as swirling light. Pt not on antiplatelet or anticoagulation PTA. NSR. In March 2019 had 24 hr Holter for syncopal episode without concerning findings. Discharged on  mg daily, Plavix 75, both of which are therapeutic by assay and rosuvastatin 20 mg qhs. , A1C 5.9. Still with visual disturbance on discharge. Interval History:     Pt presents today, 11/17/20. No further symptoms of leg weakness. Visual disturbance has resolved. Soon after discharge the cough is disappeared and she had some dark spots which have since resolved. Compliant with meds.   Aspirin and Plavix confirmed therapeutic, rosuvastatin which was dropped to 20 mg by PCP. Recommended that she stay on it as even if she does not have significant cholesterol problems still beneficial for stroke prevention. Per neuro interventional surgery stay on dual antiplatelet for 3 months. Confirmed with patient. BP within goal of <140/<80 without antihypertensive. Feels well and back to baseline    Review of systems  Review of systems negative except as detailed in the HPI, interval, PMH and A&P        Past Medical History:   Diagnosis Date    Cancer ([facility] [address]     basal cell skin carcinoma removed.  Cerebral artery occlusion with cerebral infarction ([facility] [address] 10/2020    Gastroesophageal reflux disease without esophagitis 2016    Herpes     right gluteal outbreaks    Osteoporosis     Snoring     Thyroid disease     hypothyroidism    Venous malformation 2015    Left foot and has had embolization procedure.  Vision decreased      Past Surgical History:   Procedure Laterality Date    HX DILATION AND CURETTAGE      HX OTHER SURGICAL  2015    emobolization of left foot for vascular malformation      Family History   Problem Relation Age of Onset    Hypertension Mother     Cancer Father     Heart Disease Father         cad    Cancer Sister 72        breast    Breast Cancer Sister 59     Social History     Tobacco Use    Smoking status: Former Smoker     Last attempt to quit: 1960     Years since quittin.9    Smokeless tobacco: Never Used   Substance Use Topics    Alcohol use: No      Current Outpatient Medications   Medication Sig Dispense Refill    [START ON 12/10/2020] clopidogreL (PLAVIX) 75 mg tab Take 1 Tab by mouth daily. Indications: prevention for a blood clot going to the brain 30 Tab 0    levothyroxine (SYNTHROID) 112 mcg tablet Take 1 Tab by mouth Daily (before breakfast). 90 Tab 1    rosuvastatin (CRESTOR) 20 mg tablet Take 1 Tab by mouth nightly.  Indications: hardening of the arteries due to plaque buildup 30 Tab 1    valacyclovir HCl (VALTREX PO) Take  by mouth as needed.  aspirin 81 mg chewable tablet Take 2 Tabs by mouth daily. Indications: stroke prevention 30 Tab 0    omeprazole (PRILOSEC) 20 mg capsule Take 20 mg by mouth daily as needed (\"if I take advil\").  CALCIUM CARBONATE/VITAMIN D3 (CALCIUM 600 + D PO) Take 1 Tab by mouth two (2) times a day.  multivitamins-minerals-lutein (CENTRUM SILVER) Tab Take  by mouth.  tretinoin (RETIN-A) 0.05 % topical cream Apply  to affected area daily as needed. Allergies   Allergen Reactions    Indocin [Indomethacin Sodium] Shortness of Breath and Swelling     Ankles & legs    Boniva [Ibandronate] Other (comments)     indigestion    Fosamax [Alendronate] Other (comments)     Indigestion            Objective:      Visit Vitals  /80 (BP 1 Location: Left arm, BP Patient Position: Sitting)   Pulse 82   Resp 16   Ht 5' 8\" (1.727 m)   Wt 62.6 kg (138 lb)   SpO2 98%   BMI 20.98 kg/m²        General: No distress. Well groomed  Heart: Regular rate and rhythm. Lungs: Unlabored  Musculoskeletal: Extremities w/o edema or muscle wasting   Skin: Warm dry skin  Psych: Good mood and affect     Neurologic Exam:    Mental Status:  Alert and oriented x 4. Normal processing  Coherent conversation and responded appropriately  Able to follow directions without difficulty     Language:    Normal fluency and comprehension    Cranial Nerves:   Pupils equal, round and reactive to light. Visual fields intact. Extraocular movements intact w/o nystagmus      Facial sensation intact to LT. Facial activation symmetric. Hearing intact bilaterally. No dysarthria. Shoulder shrug 5/5 bilaterally. Motor:    Bulk and tone normal.      5/5 strength in all extremities. No pronator drift.       No involuntary movements, resting or intention tremor    Sensation:    Sensation intact throughout to light touch    Coordination & Gait: No ataxia with finger to nose. No Romberg. Gait normal      MMSE  No flowsheet data found. PHQ  3 most recent PHQ Screens 10/22/2020   Little interest or pleasure in doing things Not at all   Feeling down, depressed, irritable, or hopeless Not at all   Total Score PHQ 2 0       Lab Results   Component Value Date/Time    WBC 5.9 10/14/2020 02:42 PM    HCT 39.3 10/14/2020 02:42 PM    HGB 12.6 10/14/2020 02:42 PM    PLATELET 303 84/89/7104 02:42 PM       Lab Results   Component Value Date/Time    Sodium 138 10/14/2020 02:42 PM    Potassium 3.9 10/14/2020 02:42 PM    Chloride 103 10/14/2020 02:42 PM    CO2 29 10/14/2020 02:42 PM    Glucose 163 (H) 10/14/2020 02:42 PM    BUN 11 10/14/2020 02:42 PM    Creatinine 0.83 10/14/2020 02:42 PM    Calcium 9.7 10/14/2020 02:42 PM       No components found for: TROPQUANT,  SGOT,  GPT,  ALT,  AP,  TBIL,  TBILI,  TP,  ALB,  GLOB,  GGT,  AML,  AMYP,  LPSE,  HLPSE    No results found for: JOSUÉ    Lab Results   Component Value Date/Time    Hemoglobin A1c 5.9 (H) 10/15/2020 01:09 AM        No results found for: B12LT, FOL, RBCF    No results found for: JOSUÉ, Corky Pat, XBANA    Lab Results   Component Value Date/Time    Cholesterol, total 173 10/15/2020 01:09 AM    HDL Cholesterol 59 10/15/2020 01:09 AM    LDL, calculated 103 (H) 10/15/2020 01:09 AM    VLDL, calculated 11 10/15/2020 01:09 AM    Triglyceride 55 10/15/2020 01:09 AM    CHOL/HDL Ratio 2.9 10/15/2020 01:09 AM       XR Results   Results from Hospital Encounter encounter on 10/14/20   XR CHEST PA LAT    Impression IMPRESSION: No acute cardiopulmonary disease. Results from [place] encounter on 09/04/09   XR LUMBAR SPINE 2 OR 3 VIEWS       CT Results:  Results from [place] encounter on 10/14/20   CTA HEAD NECK W CONT    Narrative EXAM:  CTA HEAD NECK W CONT    INDICATION:   Right PCA stenosis on MRA yesterday; left visual field  abnormalities    COMPARISON:  MRA head 10/13/2020.     CONTRAST:  100 mL of Isovue-370. TECHNIQUE:  Unenhanced  images were obtained to localize the volume for  acquisition. Multislice helical axial CT angiography was performed from the  aortic arch to the top of the head during uneventful rapid bolus intravenous  contrast administration. Coronal and sagittal reformations and 3D post  processing was performed. CT dose reduction was achieved through use of a  standardized protocol tailored for this examination and automatic exposure  control for dose modulation. FINDINGS:    CTA Head:  There is no evidence of large vessel occlusion or flow-limiting stenosis of the  intracranial internal carotid, anterior cerebral, and middle cerebral arteries. The anterior communicating artery is patent. The ophthalmic arteries are patent. Unchanged severe focal stenosis of the right P2 segment (series 606 image 31). There is no otherwise evidence of large vessel occlusion or flow-limiting  stenosis of the intracranial vertebral arteries, basilar artery, or posterior  cerebral arteries. The posterior communicating arteries are not seen. There is no evidence of aneurysm or vascular malformation. The dural venous  sinuses and deep cerebral venous system are patent. No evidence of abnormal  enhancement on delayed phase images. CTA NECK:  NASCET method was utilized for calculating stenosis. The aortic arch is unremarkable. The common carotid arteries demonstrate no  significant stenosis. Calcific atherosclerosis of the right carotid bifurcation  with mild (less than 50%) stenosis of the proximal right internal carotid  artery. Calcific atherosclerosis of the left carotid bifurcation with mild (less  than 50%) stenosis of the proximal left internal carotid artery. There is a slightly right dominant vertebrobasilar arterial system. The cervical  vertebral arteries are normal in course, size and contour without significant  stenosis. Atrophic thyroid gland.  Heterogeneous biapical consolidative opacities, likely  representing scarring. Additional subpleural areas of groundglass opacity in the  left upper lobe, which appear to represent chronic fibrosis/scarring. No acute  fracture or aggressive osseous lesion. Reversal of the cervical lordosis with  grade 1 anterolisthesis of C4 on C5 and C5 on C6. Multilevel degenerative disc  disease and facet arthropathy in the cervical spine. Impression IMPRESSION:     CTA Head:  1. Unchanged severe focal stenosis of the right P2 segment. 2. Otherwise no evidence of significant stenosis or aneurysm. CTA Neck:  1. No evidence of flow-limiting stenosis. 2. Mild stenosis (less than 50% by NASCET criteria) of the proximal bilateral  internal carotid arteries. CT HEAD WO CONT    Narrative EXAM:  CT HEAD WO CONT    INDICATION:   Right PCA stenosis, visual changes for a few days; rule out stroke    COMPARISON: MRA head 10/13/2020. TECHNIQUE: Unenhanced CT of the head was performed using 5 mm images. Brain and  bone windows were generated. CT dose reduction was achieved through use of a  standardized protocol tailored for this examination and automatic exposure  control for dose modulation. FINDINGS:  The ventricles are normal in size and position. Basilar cisterns are patent. No  midline shift. There is no evidence of acute infarct, hemorrhage, or extraaxial  fluid collection. The paranasal sinuses, mastoid air cells, and middle ears are clear. The orbital  contents are within normal limits. There are no significant osseous or  extracranial soft tissue lesions. Impression IMPRESSION:  1. No evidence of acute intracranial abnormality.          MRI Results:  Results from [place] encounter on 10/14/20   MRI CODE NEURO BRAIN WO CONT    Narrative EXAM:  MRI CODE NEURO BRAIN WO CONT    INDICATION:    Right PCA stenosis; left visual field abnormalities and  intermittent left leg weakness; rule out stroke    COMPARISON: CTA head neck 10/14/2020, MRA head 10/13/2020. CONTRAST: None. TECHNIQUE:    Multiplanar multisequence acquisition without contrast of the brain. FINDINGS:  Few small acute infarcts in the right occipital lobe. No evidence of acute  hemorrhage. The ventricles are normal in size and position. There is no extra-axial fluid  collection or mass effect. There is no cerebellar tonsillar herniation. Expected  arterial flow-voids are present. The paranasal sinuses, mastoid air cells, and middle ears are clear. The orbital  contents are within normal limits. No significant osseous or scalp lesions are  identified. Impression IMPRESSION:   1. Few small acute infarcts in the right occipital lobe. 23X         Results from [facility] encounter on 10/13/20   MRA BRAIN WO CONT    Narrative Clinical history: Change in vision, transient change in vision  INDICATION:   Vision changes  COMPARISON: None  TECHNIQUE:  3-D time-of-flight MRA of the brain was performed. Multiplanar  reconstructions were obtained. FINDINGS:  The A2 segments are within normal limits. There are A1 segments bilaterally. Mild stenosis A1 segment on the right. Petrous and cavernous ICAs are within  normal limits. Mildly hypoplastic on the right. M1 segments are patent. Mild  stenosis M1 segment on the right. Mild stenosis M1 segment on the left. The left  vertebral artery is smaller than the right vertebral artery. 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 P1 segments on the left are within normal limits. Moderate to severe  stenosis P1/P2 segment on the right. . The basilar artery and its branches are  normal. The internal carotid, anterior cerebral, and middle cerebral arteries  are patent. . There is no aneurysm. There are no sizable posterior communicating  arteries. Impression IMPRESSION:  Moderate to severe stenosis P1 segment on the right. Mild stenosis/hypoplasia of the A1 segment on the right. .    No major vessel occlusion or intracranial aneurysm. VAS/US Results (maximum last 3): No results found for this or any previous visit. TTE  10/14/20   ECHO ADULT COMPLETE 10/15/2020 10/15/2020    Narrative · Saline contrast was given to evaluate for intracardiac shunt. · LV: Estimated LVEF is 60 - 65%. Normal cavity size, wall thickness and   systolic function (ejection fraction normal). Wall motion: normal.        Signed by: Chantal Freitas MD         EKG  Results for orders placed or performed during the hospital encounter of 10/14/20   EKG, 12 LEAD, INITIAL   Result Value Ref Range    Ventricular Rate 67 BPM    Atrial Rate 67 BPM    P-R Interval 156 ms    QRS Duration 88 ms    Q-T Interval 400 ms    QTC Calculation (Bezet) 422 ms    Calculated P Axis 83 degrees    Calculated R Axis 82 degrees    Calculated T Axis 83 degrees    Diagnosis       Normal sinus rhythm  No previous ECGs available  Confirmed by Geeta Tillman MD, Baird Schwab ([id number]) on 10/15/2020 8:54:18 AM             Assessment/Plan:     Kathy Ivory is a 76 y.o. female presented 10/14/20 to the ED for changes in her vision and self reported 3 episodes of LLE weakness occurring 3-4 days prior to admit lasting <30 seconds. She also reported she daily episodes of colored lights flashing in lateral half of vision on the left for 1 week. Ophthalmology found no abnormalities on eye exam. PTA, PCP ordered an MRA brain, which showed moderate to severe stenosis P1 segment on the right. PCP referred to [facility] who told pt to come to ED. In ED, CTH negative for acute process. CTA demonstrated severe focal stenosis of the right P2 segment. MRI brain showed few small acute infarcts in the right occipital lobe, likely related to the PCA stenosis. Pt felt that she had returned to her baseline and that the weakness in her left leg had resolved, but still has visual disturbance, described as swirling light. Pt not on antiplatelet or anticoagulation PTA.  Felt that symptoms and occipital stroke related to right P1/P2 stenosis. Discharged on  mg daily, Plavix 75, both of which are therapeutic by assay and rosuvastatin 20 mg qhs. , A1C 5.9. Still with visual disturbance on discharge. Follow up visit today 11/17/20, with no recurrence of weakness. Visual disturbance resolved. Visual fields intact. Nonfocal exam. Reminded to stay on ASA and Plavix until completed 3 months of Plavix per neuro interventional surgery. Then can go to aspirin alone. Let us know if a physician wants to stop it, as aspirin essential for stroke prevention. No significant bruising, or SE with rosuvastatin. - Continue stroke prevention with  mg daily, Plavix 75 mg daily and rosuvastatin 20 mg qhs.   -Complete 3 months of Plavix then just aspirin. (Per neuro interventional surgery)  - BP goal < 140/<80  - Stroke education and reminded not to wait to go to ED for new symptoms.   - No need for follow up unless she has concerning issues. - Instructed to call with questions or concerns. Visit Diagnoses    ICD-10-CM ICD-9-CM    1. Acute ischemic stroke (HCC)  I63.9 434.91 clopidogreL (PLAVIX) 75 mg tab   2. Intracranial atherosclerosis  I67.2 437.0 clopidogreL (PLAVIX) 75 mg tab   3.  Visual disturbance as complication of stroke  R58.947 438.7     H53.9             Signed By: Jeanette Barboza NP     November 17, 2020 10:22 AM

## 2020-12-15 ENCOUNTER — TELEPHONE (OUTPATIENT)
Dept: INTERNAL MEDICINE CLINIC | Age: 75
End: 2020-12-15

## 2020-12-16 NOTE — TELEPHONE ENCOUNTER
Notify patient that loss of or change in taste as well as diarrhea can be signs of Covid infection. She should make an appointment with urgent care to have a Covid test.  At this point she does need to continue with her Plavix as it is being used to prevent further strokes. The Crestor was started back in October and if her diarrhea is new it is unlikely related to Crestor. If her Covid test is negative and she remains concerned and symptoms not improving we can see her for a virtual visit.

## 2020-12-16 NOTE — TELEPHONE ENCOUNTER
Spoke with patient and informed per provide note. Patient will call office with results of COVID testing.

## 2020-12-21 ENCOUNTER — TELEPHONE (OUTPATIENT)
Dept: INTERNAL MEDICINE CLINIC | Age: 75
End: 2020-12-21

## 2020-12-21 NOTE — TELEPHONE ENCOUNTER
Pt said the covid test was negative  Pt said she is having very bad reaction to medications she is taking can you call pt at [phone]

## 2020-12-24 ENCOUNTER — OFFICE VISIT (OUTPATIENT)
Dept: INTERNAL MEDICINE CLINIC | Age: 75
End: 2020-12-24
Payer: MEDICARE

## 2020-12-24 VITALS
RESPIRATION RATE: 18 BRPM | DIASTOLIC BLOOD PRESSURE: 63 MMHG | WEIGHT: 130 LBS | OXYGEN SATURATION: 100 % | SYSTOLIC BLOOD PRESSURE: 104 MMHG | HEART RATE: 88 BPM | HEIGHT: 68 IN | BODY MASS INDEX: 19.7 KG/M2 | TEMPERATURE: 97.3 F

## 2020-12-24 DIAGNOSIS — R19.7 DIARRHEA, UNSPECIFIED TYPE: Primary | ICD-10-CM

## 2020-12-24 DIAGNOSIS — E03.9 ACQUIRED HYPOTHYROIDISM: ICD-10-CM

## 2020-12-24 DIAGNOSIS — I67.2 INTRACRANIAL ATHEROSCLEROSIS: ICD-10-CM

## 2020-12-24 DIAGNOSIS — E78.00 HYPERCHOLESTEROLEMIA: ICD-10-CM

## 2020-12-24 DIAGNOSIS — I63.9 ACUTE ISCHEMIC STROKE (HCC): ICD-10-CM

## 2020-12-24 DIAGNOSIS — Z86.73 HISTORY OF CVA (CEREBROVASCULAR ACCIDENT): ICD-10-CM

## 2020-12-24 LAB
ALBUMIN SERPL-MCNC: 3.9 G/DL (ref 3.5–5)
ALBUMIN/GLOB SERPL: 1.3 {RATIO} (ref 1.1–2.2)
ALP SERPL-CCNC: 88 U/L (ref 45–117)
ALT SERPL-CCNC: 36 U/L (ref 12–78)
ANION GAP SERPL CALC-SCNC: 5 MMOL/L (ref 5–15)
AST SERPL-CCNC: 39 U/L (ref 15–37)
BASOPHILS # BLD: 0.1 K/UL (ref 0–0.1)
BASOPHILS NFR BLD: 1 % (ref 0–1)
BILIRUB SERPL-MCNC: 0.4 MG/DL (ref 0.2–1)
BUN SERPL-MCNC: 6 MG/DL (ref 6–20)
BUN/CREAT SERPL: 9 (ref 12–20)
CALCIUM SERPL-MCNC: 9.3 MG/DL (ref 8.5–10.1)
CHLORIDE SERPL-SCNC: 104 MMOL/L (ref 97–108)
CHOLEST SERPL-MCNC: 103 MG/DL
CO2 SERPL-SCNC: 28 MMOL/L (ref 21–32)
CREAT SERPL-MCNC: 0.7 MG/DL (ref 0.55–1.02)
DIFFERENTIAL METHOD BLD: ABNORMAL
EOSINOPHIL # BLD: 0 K/UL (ref 0–0.4)
EOSINOPHIL NFR BLD: 0 % (ref 0–7)
ERYTHROCYTE [DISTWIDTH] IN BLOOD BY AUTOMATED COUNT: 12.6 % (ref 11.5–14.5)
GLOBULIN SER CALC-MCNC: 2.9 G/DL (ref 2–4)
GLUCOSE SERPL-MCNC: 91 MG/DL (ref 65–100)
HCT VFR BLD AUTO: 40.8 % (ref 35–47)
HDLC SERPL-MCNC: 48 MG/DL
HDLC SERPL: 2.1 {RATIO} (ref 0–5)
HGB BLD-MCNC: 12.8 G/DL (ref 11.5–16)
IMM GRANULOCYTES # BLD AUTO: 0 K/UL (ref 0–0.04)
IMM GRANULOCYTES NFR BLD AUTO: 0 % (ref 0–0.5)
LDLC SERPL CALC-MCNC: 36.6 MG/DL (ref 0–100)
LIPID PROFILE,FLP: NORMAL
LYMPHOCYTES # BLD: 0.7 K/UL (ref 0.8–3.5)
LYMPHOCYTES NFR BLD: 12 % (ref 12–49)
MCH RBC QN AUTO: 28.7 PG (ref 26–34)
MCHC RBC AUTO-ENTMCNC: 31.4 G/DL (ref 30–36.5)
MCV RBC AUTO: 91.5 FL (ref 80–99)
MONOCYTES # BLD: 0.5 K/UL (ref 0–1)
MONOCYTES NFR BLD: 8 % (ref 5–13)
NEUTS SEG # BLD: 4.8 K/UL (ref 1.8–8)
NEUTS SEG NFR BLD: 79 % (ref 32–75)
NRBC # BLD: 0 K/UL (ref 0–0.01)
NRBC BLD-RTO: 0 PER 100 WBC
PLATELET # BLD AUTO: 322 K/UL (ref 150–400)
PMV BLD AUTO: 10.1 FL (ref 8.9–12.9)
POTASSIUM SERPL-SCNC: 3.8 MMOL/L (ref 3.5–5.1)
PROT SERPL-MCNC: 6.8 G/DL (ref 6.4–8.2)
RBC # BLD AUTO: 4.46 M/UL (ref 3.8–5.2)
RBC MORPH BLD: ABNORMAL
SODIUM SERPL-SCNC: 137 MMOL/L (ref 136–145)
T4 FREE SERPL-MCNC: 2.2 NG/DL (ref 0.8–1.5)
TRIGL SERPL-MCNC: 92 MG/DL (ref ?–150)
TSH SERPL DL<=0.05 MIU/L-ACNC: 0.49 UIU/ML (ref 0.36–3.74)
VLDLC SERPL CALC-MCNC: 18.4 MG/DL
WBC # BLD AUTO: 6.1 K/UL (ref 3.6–11)

## 2020-12-24 PROCEDURE — G8427 DOCREV CUR MEDS BY ELIG CLIN: HCPCS | Performed by: INTERNAL MEDICINE

## 2020-12-24 PROCEDURE — G8536 NO DOC ELDER MAL SCRN: HCPCS | Performed by: INTERNAL MEDICINE

## 2020-12-24 PROCEDURE — G8399 PT W/DXA RESULTS DOCUMENT: HCPCS | Performed by: INTERNAL MEDICINE

## 2020-12-24 PROCEDURE — G8432 DEP SCR NOT DOC, RNG: HCPCS | Performed by: INTERNAL MEDICINE

## 2020-12-24 PROCEDURE — 1101F PT FALLS ASSESS-DOCD LE1/YR: CPT | Performed by: INTERNAL MEDICINE

## 2020-12-24 PROCEDURE — G0463 HOSPITAL OUTPT CLINIC VISIT: HCPCS | Performed by: INTERNAL MEDICINE

## 2020-12-24 PROCEDURE — 99214 OFFICE O/P EST MOD 30 MIN: CPT | Performed by: INTERNAL MEDICINE

## 2020-12-24 PROCEDURE — 3017F COLORECTAL CA SCREEN DOC REV: CPT | Performed by: INTERNAL MEDICINE

## 2020-12-24 PROCEDURE — G8420 CALC BMI NORM PARAMETERS: HCPCS | Performed by: INTERNAL MEDICINE

## 2020-12-24 PROCEDURE — 1090F PRES/ABSN URINE INCON ASSESS: CPT | Performed by: INTERNAL MEDICINE

## 2020-12-24 NOTE — PROGRESS NOTES
Candi Cardoza is a 76 y.o. female who was seen today for an acute visit. Assessment & Plan:     Diagnoses and all orders for this visit:    1. Diarrhea, unspecified type with fatigue and weight loss  Not improving. Etiology unclear. Reviewed possible causes including infection or drug reaction. On review of literature on Plavix there have been some reports of colitis that have occurred with Plavix. At this point will obtain lab work and stool studies to rule out metabolic and infectious causes. She can use Imodium as needed. Reviewed red flags for going to the emergency room or seeking urgent care. We will stop Plavix and Crestor which are new medications that have been started for her and may be the cause. -     METABOLIC PANEL, COMPREHENSIVE; Future  -     TSH 3RD GENERATION; Future  -     T4, FREE; Future  -     CBC WITH AUTOMATED DIFF; Future  -     OVA & PARASITES, STOOL; Future  -     WBC, STOOL; Future  -     C DIFFICILE TOXIN A & B BY EIA; Future  -     ENTERIC BACTERIA PANEL, DNA; Future  -     C. DIFFICILE AG & TOXIN A/B; Future    2. Intracranial atherosclerosis history of CVA  Reviewed original plan which had been to remain on Plavix for a full 3 months. She has 2 and half weeks left to take of her Plavix. However given concerns that her diarrhea may be related to Plavix induced colitis we will stop this medication as well as her rosuvastatin for the next 2 weeks and monitor for improvement. She will continue with 2 baby aspirin today. She understands if she has any neurologic changes to go to the emergency room and contact me. 3. Acquired hypothyroidism  Check level as this may be a cause of diarrhea. Continue current medication  4. Hypercholesterolemia  -     LIPID PANEL; Future  -     METABOLIC PANEL, COMPREHENSIVE; Future      Follow-up and Dispositions    · Return in about 2 weeks (around 1/7/2021).            Subjective:   Candi Cardoza was seen for Diarrhea and Stool Color Change  she reports she has been having 4-5 loose watery diarrhea episodes a day with no blood or mucus noted for the past 10 days. Has lost 2-3 pounds. Eating exacerbates the diarrhea.  2-1/2 months ago she was started on plavix and crestor and asa after vision changes that are thought to have been eyes CVA and MRA indicated she had significant stenosis. She was evaluated by interventional radiology and it was determined that she should remain on 3 months of Plavix. She has 2 and half weeks left ago. She was also at that time started on rosuvastatin. .    She also reports  food does not taste good. Has had neg covid test recently. Current Outpatient Medications   Medication Sig Dispense Refill    clopidogreL (PLAVIX) 75 mg tab Take 1 Tab by mouth daily. Indications: prevention for a blood clot going to the brain 30 Tab 0    levothyroxine (SYNTHROID) 112 mcg tablet Take 1 Tab by mouth Daily (before breakfast). 90 Tab 1    rosuvastatin (CRESTOR) 20 mg tablet Take 1 Tab by mouth nightly. Indications: hardening of the arteries due to plaque buildup 30 Tab 1    aspirin 81 mg chewable tablet Take 2 Tabs by mouth daily. Indications: stroke prevention 30 Tab 0    multivitamins-minerals-lutein (CENTRUM SILVER) Tab Take  by mouth.  valacyclovir HCl (VALTREX PO) Take  by mouth as needed.  tretinoin (RETIN-A) 0.05 % topical cream Apply  to affected area daily as needed.  omeprazole (PRILOSEC) 20 mg capsule Take 20 mg by mouth daily as needed (\"if I take advil\").  CALCIUM CARBONATE/VITAMIN D3 (CALCIUM 600 + D PO) Take 1 Tab by mouth two (2) times a day. Review of Systems   Constitutional: Positive for malaise/fatigue and weight loss. Respiratory: Negative for shortness of breath. Cardiovascular: Negative for chest pain. Gastrointestinal: Negative for abdominal pain, blood in stool, constipation, heartburn, melena, nausea and vomiting. Neurological: Negative for dizziness.     - per HPI    Physical Exam  Vitals signs and nursing note reviewed. Constitutional:       General: She is not in acute distress. Appearance: She is well-developed. HENT:      Head: Normocephalic and atraumatic. Cardiovascular:      Rate and Rhythm: Normal rate and regular rhythm. Pulmonary:      Effort: Pulmonary effort is normal.      Breath sounds: Normal breath sounds. Abdominal:      General: Bowel sounds are normal.      Palpations: Abdomen is soft. Tenderness: There is no abdominal tenderness. Musculoskeletal:      Right lower leg: No edema. Left lower leg: No edema. Psychiatric:         Mood and Affect: Mood normal.       Visit Vitals  /63 (BP 1 Location: Left arm)   Pulse 88   Temp 97.3 °F (36.3 °C) (Temporal)   Resp 18   Ht 5' 8\" (1.727 m)   Wt 130 lb (59 kg)   SpO2 100%   BMI 19.77 kg/m²        Aspects of this note may have been generated using voice recognition software. Despite editing, there may be some syntax errors   I have discussed the diagnosis with the patient and the intended plan as seen in the above orders. The patient has received an after-visit summary and questions were answered concerning future plans. I have discussed any recommended medication side effects and warnings with the patient as well.   She has expressed understanding of the diagnosis and plan    Emely Pham MD

## 2020-12-27 NOTE — PROGRESS NOTES
Notify patient that thyroid is improved but is not at goal.  This is a possible cause of her diarrhea. Confirm taking medication daily and if so will need to decrease levothyroxine to 88 mcg qam # 90 and #0 RF. Recheck TSH and T4 in 8 weeks.

## 2020-12-28 RX ORDER — LEVOTHYROXINE SODIUM 88 UG/1
88 TABLET ORAL
Qty: 90 TAB | Refills: 0 | Status: SHIPPED | OUTPATIENT
Start: 2020-12-28 | End: 2021-03-23

## 2020-12-28 NOTE — PROGRESS NOTES
Spoke with patient and let her know that her thyroid is not at goal and we are lowering her dose. Will send new RX to pharm.

## 2020-12-29 NOTE — TELEPHONE ENCOUNTER
Call from Juan Daniel Graf at [email or web address]. Stated they received a specimen yesterday for C-diff, stool was completely formed and normal appearing. States they will not run c-diff on formed stool, lab policy. Advised I would notify Dr. Erwin Hammans of this.

## 2021-01-01 ENCOUNTER — APPOINTMENT (OUTPATIENT)
Dept: CT IMAGING | Age: 76
End: 2021-01-01
Attending: EMERGENCY MEDICINE
Payer: MEDICARE

## 2021-01-01 ENCOUNTER — APPOINTMENT (OUTPATIENT)
Dept: GENERAL RADIOLOGY | Age: 76
End: 2021-01-01
Attending: EMERGENCY MEDICINE
Payer: MEDICARE

## 2021-01-01 ENCOUNTER — HOSPITAL ENCOUNTER (EMERGENCY)
Age: 76
Discharge: HOME | End: 2021-01-01
Attending: EMERGENCY MEDICINE | Admitting: EMERGENCY MEDICINE
Payer: MEDICARE

## 2021-01-01 VITALS
HEART RATE: 64 BPM | OXYGEN SATURATION: 100 % | HEIGHT: 68 IN | BODY MASS INDEX: 19.7 KG/M2 | RESPIRATION RATE: 19 BRPM | TEMPERATURE: 97.8 F | DIASTOLIC BLOOD PRESSURE: 64 MMHG | WEIGHT: 130 LBS | SYSTOLIC BLOOD PRESSURE: 138 MMHG

## 2021-01-01 DIAGNOSIS — H53.8 BLURRY VISION, LEFT EYE: Primary | ICD-10-CM

## 2021-01-01 LAB
ALBUMIN SERPL-MCNC: 3.9 G/DL (ref 3.5–5)
ALBUMIN/GLOB SERPL: 1.2 {RATIO} (ref 1.1–2.2)
ALP SERPL-CCNC: 77 U/L (ref 45–117)
ALT SERPL-CCNC: 43 U/L (ref 12–78)
ANION GAP SERPL CALC-SCNC: 1 MMOL/L (ref 5–15)
APPEARANCE UR: CLEAR
AST SERPL-CCNC: 29 U/L (ref 15–37)
BASOPHILS # BLD: 0 K/UL (ref 0–0.1)
BASOPHILS NFR BLD: 0 % (ref 0–1)
BILIRUB SERPL-MCNC: 0.4 MG/DL (ref 0.2–1)
BILIRUB UR QL: NEGATIVE
BUN SERPL-MCNC: 8 MG/DL (ref 6–20)
BUN/CREAT SERPL: 10 (ref 12–20)
CALCIUM SERPL-MCNC: 9.9 MG/DL (ref 8.5–10.1)
CHLORIDE SERPL-SCNC: 104 MMOL/L (ref 97–108)
CO2 SERPL-SCNC: 33 MMOL/L (ref 21–32)
COLOR UR: ABNORMAL
COMMENT, HOLDF: NORMAL
CREAT SERPL-MCNC: 0.83 MG/DL (ref 0.55–1.02)
DIFFERENTIAL METHOD BLD: NORMAL
EOSINOPHIL # BLD: 0 K/UL (ref 0–0.4)
EOSINOPHIL NFR BLD: 0 % (ref 0–7)
ERYTHROCYTE [DISTWIDTH] IN BLOOD BY AUTOMATED COUNT: 13.5 % (ref 11.5–14.5)
GLOBULIN SER CALC-MCNC: 3.2 G/DL (ref 2–4)
GLUCOSE BLD STRIP.AUTO-MCNC: 154 MG/DL (ref 65–100)
GLUCOSE SERPL-MCNC: 152 MG/DL (ref 65–100)
GLUCOSE UR STRIP.AUTO-MCNC: NEGATIVE MG/DL
HCT VFR BLD AUTO: 39.3 % (ref 35–47)
HGB BLD-MCNC: 12.3 G/DL (ref 11.5–16)
HGB UR QL STRIP: NEGATIVE
IMM GRANULOCYTES # BLD AUTO: 0 K/UL (ref 0–0.04)
IMM GRANULOCYTES NFR BLD AUTO: 0 % (ref 0–0.5)
INR PPP: 1.1 (ref 0.9–1.1)
KETONES UR QL STRIP.AUTO: NEGATIVE MG/DL
LEUKOCYTE ESTERASE UR QL STRIP.AUTO: NEGATIVE
LYMPHOCYTES # BLD: 1.2 K/UL (ref 0.8–3.5)
LYMPHOCYTES NFR BLD: 22 % (ref 12–49)
MCH RBC QN AUTO: 28.7 PG (ref 26–34)
MCHC RBC AUTO-ENTMCNC: 31.3 G/DL (ref 30–36.5)
MCV RBC AUTO: 91.6 FL (ref 80–99)
MONOCYTES # BLD: 0.4 K/UL (ref 0–1)
MONOCYTES NFR BLD: 7 % (ref 5–13)
NEUTS SEG # BLD: 3.7 K/UL (ref 1.8–8)
NEUTS SEG NFR BLD: 71 % (ref 32–75)
NITRITE UR QL STRIP.AUTO: NEGATIVE
NRBC # BLD: 0 K/UL (ref 0–0.01)
NRBC BLD-RTO: 0 PER 100 WBC
PH UR STRIP: 8.5 [PH] (ref 5–8)
PLATELET # BLD AUTO: 286 K/UL (ref 150–400)
PMV BLD AUTO: 9.9 FL (ref 8.9–12.9)
POTASSIUM SERPL-SCNC: 4 MMOL/L (ref 3.5–5.1)
PROT SERPL-MCNC: 7.1 G/DL (ref 6.4–8.2)
PROT UR STRIP-MCNC: NEGATIVE MG/DL
PROTHROMBIN TIME: 11.2 SEC (ref 9–11.1)
RBC # BLD AUTO: 4.29 M/UL (ref 3.8–5.2)
SAMPLES BEING HELD,HOLD: NORMAL
SERVICE CMNT-IMP: ABNORMAL
SODIUM SERPL-SCNC: 138 MMOL/L (ref 136–145)
SP GR UR REFRACTOMETRY: >1.03
T4 FREE SERPL-MCNC: 1.6 NG/DL (ref 0.8–1.5)
TROPONIN I SERPL-MCNC: <0.05 NG/ML
TSH SERPL DL<=0.05 MIU/L-ACNC: 0.32 UIU/ML (ref 0.36–3.74)
UR CULT HOLD, URHOLD: NORMAL
UROBILINOGEN UR QL STRIP.AUTO: 0.2 EU/DL (ref 0.2–1)
WBC # BLD AUTO: 5.3 K/UL (ref 3.6–11)

## 2021-01-01 PROCEDURE — 84439 ASSAY OF FREE THYROXINE: CPT

## 2021-01-01 PROCEDURE — 84443 ASSAY THYROID STIM HORMONE: CPT

## 2021-01-01 PROCEDURE — 93005 ELECTROCARDIOGRAM TRACING: CPT

## 2021-01-01 PROCEDURE — 85025 COMPLETE CBC W/AUTO DIFF WBC: CPT

## 2021-01-01 PROCEDURE — 81003 URINALYSIS AUTO W/O SCOPE: CPT

## 2021-01-01 PROCEDURE — 80053 COMPREHEN METABOLIC PANEL: CPT

## 2021-01-01 PROCEDURE — 36415 COLL VENOUS BLD VENIPUNCTURE: CPT

## 2021-01-01 PROCEDURE — 99285 EMERGENCY DEPT VISIT HI MDM: CPT

## 2021-01-01 PROCEDURE — 0042T CT CODE NEURO PERF W CBF: CPT

## 2021-01-01 PROCEDURE — 85610 PROTHROMBIN TIME: CPT

## 2021-01-01 PROCEDURE — 70496 CT ANGIOGRAPHY HEAD: CPT

## 2021-01-01 PROCEDURE — 74011000636 HC RX REV CODE- 636: Performed by: RADIOLOGY

## 2021-01-01 PROCEDURE — 84484 ASSAY OF TROPONIN QUANT: CPT

## 2021-01-01 PROCEDURE — 82962 GLUCOSE BLOOD TEST: CPT

## 2021-01-01 PROCEDURE — 70450 CT HEAD/BRAIN W/O DYE: CPT

## 2021-01-01 PROCEDURE — 71046 X-RAY EXAM CHEST 2 VIEWS: CPT

## 2021-01-01 RX ADMIN — IOPAMIDOL 40 ML: 755 INJECTION, SOLUTION INTRAVENOUS at 13:43

## 2021-01-01 RX ADMIN — IOPAMIDOL 80 ML: 755 INJECTION, SOLUTION INTRAVENOUS at 13:43

## 2021-01-01 NOTE — ED TRIAGE NOTES
Triage:  Pt to the ED from home due to concerns over vision changes in left eye noticed this AM. Pt states earlier she noted a waviness in the left peripheral vision field. Pt states symptoms have resolved currently, but Pt states history of previous stroke.

## 2021-01-01 NOTE — ED NOTES
Code S level 2 called in triage. IV placed and blood sugar obtained. Patient ambulatory to CT with RN.  Patient reports the episode lasted about 10 mins and she was last normal at 1200

## 2021-01-01 NOTE — PROGRESS NOTES
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 VAN:   Negative    NIHSS:   1a-LOC:0    1b-Month/Age:0    1c-Open/Close Hand:0    2-Best Gaze:0    3-Visual Fields:0    4-Facial Palsy:0    5a-Left Arm:0    5b-Right Arm:0    6a-Left Le    6b-Right Le    7-Limb Ataxia:0    8-Sensory:0    9-Best Language:0    10-Dysarthria:0    11-Extinction/Inattention:0  TOTAL SCORE:0   Imaging:   CT: IMPRESSION:   No acute intracranial process. CTA: No acute thrombosis or significant intracranial stenosis. CTP: No perfusion abnormality    Plan:   TPA Candidate: NO    Mechanical thrombectomy Candidate: NO, no signs of LVO clinically      Discussed with Dr. Landry Roblero (ED Physician). Time spent: 15 minutes.      Brian Darden NP  Neurocritical Care Nurse Practitioner  [phone]

## 2021-01-01 NOTE — ED PROVIDER NOTES
Date of Service:  1/1/2021    Patient:  Rome Grove    Chief Complaint:  Blurred Vision (Left)       HPI:  Rome Grove is a 76 y.o.  female who presents for evaluation of blurry vision. Patient had acute onset blurry vision this morning in the left eye. Patient states for a little while this morning she had left eye blurry vision, she described it as a wavy sensation that resolved after about an hour. Patient in October had a TIA/stroke where she had similar onset of symptoms with also having weakness in the upper and lower extremity on the right side. The symptoms have since resolved. Today, patient notes that she was concerned because this is the same progression of symptoms that she had and last time she \"waited too long. \"  Patient denies any fevers chest pain shortness of breath abdominal pain nausea vomiting diarrhea fevers chills. She notes all symptoms are currently resolved patient was also recently taken off of her Plavix and cholesterol medicines because of side effects. Past Medical History:   Diagnosis Date    Acute ischemic stroke ([facility] 10/14/2020    Cancer ([facility] [address] 2016    basal cell skin carcinoma removed.  Cerebral artery occlusion with cerebral infarction ([facility] [address] 10/2020    Gastroesophageal reflux disease without esophagitis 8/18/2016    Herpes     right gluteal outbreaks    Osteoporosis     Snoring     Thyroid disease     hypothyroidism    Venous malformation 5/19/2015    Left foot and has had embolization procedure.      Vision decreased        Past Surgical History:   Procedure Laterality Date    HX DILATION AND CURETTAGE      HX OTHER SURGICAL  03/2015    emobolization of left foot for vascular malformation         Family History:   Problem Relation Age of Onset    Hypertension Mother     Cancer Father     Heart Disease Father         cad    Cancer Sister 72        breast    Breast Cancer Sister 59       Social History     Socioeconomic History    Marital status:      Spouse name: Not on file    Number of children: Not on file    Years of education: Not on file    Highest education level: Not on file   Occupational History    Not on file   Social Needs    Financial resource strain: Not on file    Food insecurity     Worry: Not on file     Inability: Not on file    Transportation needs     Medical: Not on file     Non-medical: Not on file   Tobacco Use    Smoking status: Former Smoker     Quit date: 56     Years since quittin.0    Smokeless tobacco: Never Used   Substance and Sexual Activity    Alcohol use: No    Drug use: No    Sexual activity: Yes     Partners: Male     Birth control/protection: None   Lifestyle    Physical activity     Days per week: Not on file     Minutes per session: Not on file    Stress: Not on file   Relationships    Social connections     Talks on phone: Not on file     Gets together: Not on file     Attends Gnosticism service: Not on file     Active member of club or organization: Not on file     Attends meetings of clubs or organizations: Not on file     Relationship status: Not on file    Intimate partner violence     Fear of current or ex partner: Not on file     Emotionally abused: Not on file     Physically abused: Not on file     Forced sexual activity: Not on file   Other Topics Concern    Not on file   Social History Narrative    Not on file         ALLERGIES: Indocin [indomethacin sodium], Boniva [ibandronate], and Fosamax [alendronate]    Review of Systems   Constitutional: Negative for fever. HENT: Negative for hearing loss. Eyes: Positive for visual disturbance. Respiratory: Negative for shortness of breath. Cardiovascular: Negative for chest pain. Gastrointestinal: Negative for abdominal pain. Genitourinary: Negative for flank pain. Musculoskeletal: Negative for back pain. Skin: Negative for rash. Neurological: Negative for dizziness and light-headedness.        Vitals: 01/01/21 1309   BP: (!) 154/60   Pulse: 71   Resp: 18   Temp: 97.8 °F (36.6 °C)   SpO2: 97%   Weight: 59 kg (130 lb)   Height: 5' 8\" (1.727 m)            Physical Exam  Vitals signs and nursing note reviewed. Constitutional:       General: She is not in acute distress. Appearance: She is well-developed. She is not ill-appearing, toxic-appearing or diaphoretic. HENT:      Head: Normocephalic and atraumatic. Mouth/Throat:      Mouth: Mucous membranes are moist.   Eyes:      General: No scleral icterus. Right eye: No discharge. Left eye: No discharge. Extraocular Movements: Extraocular movements intact. Conjunctiva/sclera: Conjunctivae normal.   Neck:      Musculoskeletal: Neck supple. Vascular: No JVD. Trachea: No tracheal deviation. Cardiovascular:      Rate and Rhythm: Normal rate and regular rhythm. Pulmonary:      Effort: Pulmonary effort is normal. No respiratory distress. Breath sounds: Normal breath sounds. Abdominal:      General: Bowel sounds are normal. There is no distension. Palpations: Abdomen is soft. Tenderness: There is no abdominal tenderness. Musculoskeletal: Normal range of motion. Right lower leg: No edema. Left lower leg: No edema. Skin:     General: Skin is warm and dry. Capillary Refill: Capillary refill takes less than 2 seconds. Findings: No rash. Neurological:      Mental Status: She is alert and oriented to person, place, and time. Cranial Nerves: No cranial nerve deficit. Sensory: No sensory deficit. Motor: No weakness.    Psychiatric:         Mood and Affect: Mood normal.         Behavior: Behavior normal.          [facility]  ED Course as of Jan 01 1714 Fri Jan 01, 2021   1354 Tele neuro to see    [GG]   1426 EKG 1420  NSR 61  Normal axis and intervals   No acute ischemic changes    [GG]      ED Course User Index  [GG] Tracy Hansen DO     VITAL SIGNS:  Patient Vitals for the past 4 hrs:   Pulse Resp BP SpO2   01/01/21 1600 64 19 138/64 100 %   01/01/21 1530 64 14 (!) 153/67 98 %   01/01/21 1515 61 13 (!) 142/70 98 %   01/01/21 1500 64 19 (!) 151/67 99 %   01/01/21 1430 64 20 (!) 148/65 95 %         LABS:  Recent Results (from the past 6 hour(s))   GLUCOSE, POC    Collection Time: 01/01/21  1:34 PM   Result Value Ref Range    Glucose (POC) 154 (H) 65 - 100 mg/dL    Performed by Micheal Simpson    CBC WITH AUTOMATED DIFF    Collection Time: 01/01/21  1:35 PM   Result Value Ref Range    WBC 5.3 3.6 - 11.0 K/uL    RBC 4.29 3.80 - 5.20 M/uL    HGB 12.3 11.5 - 16.0 g/dL    HCT 39.3 35.0 - 47.0 %    MCV 91.6 80.0 - 99.0 FL    MCH 28.7 26.0 - 34.0 PG    MCHC 31.3 30.0 - 36.5 g/dL    RDW 13.5 11.5 - 14.5 %    PLATELET 913 067 - 549 K/uL    MPV 9.9 8.9 - 12.9 FL    NRBC 0.0 0  WBC    ABSOLUTE NRBC 0.00 0.00 - 0.01 K/uL    NEUTROPHILS 71 32 - 75 %    LYMPHOCYTES 22 12 - 49 %    MONOCYTES 7 5 - 13 %    EOSINOPHILS 0 0 - 7 %    BASOPHILS 0 0 - 1 %    IMMATURE GRANULOCYTES 0 0.0 - 0.5 %    ABS. NEUTROPHILS 3.7 1.8 - 8.0 K/UL    ABS. LYMPHOCYTES 1.2 0.8 - 3.5 K/UL    ABS. MONOCYTES 0.4 0.0 - 1.0 K/UL    ABS. EOSINOPHILS 0.0 0.0 - 0.4 K/UL    ABS. BASOPHILS 0.0 0.0 - 0.1 K/UL    ABS. IMM. GRANS. 0.0 0.00 - 0.04 K/UL    DF AUTOMATED     METABOLIC PANEL, COMPREHENSIVE    Collection Time: 01/01/21  1:35 PM   Result Value Ref Range    Sodium 138 136 - 145 mmol/L    Potassium 4.0 3.5 - 5.1 mmol/L    Chloride 104 97 - 108 mmol/L    CO2 33 (H) 21 - 32 mmol/L    Anion gap 1 (L) 5 - 15 mmol/L    Glucose 152 (H) 65 - 100 mg/dL    BUN 8 6 - 20 MG/DL    Creatinine 0.83 0.55 - 1.02 MG/DL    BUN/Creatinine ratio 10 (L) 12 - 20      GFR est AA >60 >60 ml/min/1.73m2    GFR est non-AA >60 >60 ml/min/1.73m2    Calcium 9.9 8.5 - 10.1 MG/DL    Bilirubin, total 0.4 0.2 - 1.0 MG/DL    ALT (SGPT) 43 12 - 78 U/L    AST (SGOT) 29 15 - 37 U/L    Alk.  phosphatase 77 45 - 117 U/L    Protein, total 7.1 6.4 - 8.2 g/dL    Albumin 3.9 3.5 - 5.0 g/dL    Globulin 3.2 2.0 - 4.0 g/dL    A-G Ratio 1.2 1.1 - 2.2     PROTHROMBIN TIME + INR    Collection Time: 01/01/21  1:35 PM   Result Value Ref Range    INR 1.1 0.9 - 1.1      Prothrombin time 11.2 (H) 9.0 - 11.1 sec   TROPONIN I    Collection Time: 01/01/21  1:35 PM   Result Value Ref Range    Troponin-I, Qt. <0.05 <0.05 ng/mL   SAMPLES BEING HELD    Collection Time: 01/01/21  1:40 PM   Result Value Ref Range    SAMPLES BEING HELD 1RED     COMMENT        Add-on orders for these samples will be processed based on acceptable specimen integrity and analyte stability, which may vary by analyte.    TSH 3RD GENERATION    Collection Time: 01/01/21  1:40 PM   Result Value Ref Range    TSH 0.32 (L) 0.36 - 3.74 uIU/mL   T4, FREE    Collection Time: 01/01/21  1:40 PM   Result Value Ref Range    T4, Free 1.6 (H) 0.8 - 1.5 NG/DL   EKG, 12 LEAD, INITIAL    Collection Time: 01/01/21  2:20 PM   Result Value Ref Range    Ventricular Rate 61 BPM    Atrial Rate 61 BPM    P-R Interval 152 ms    QRS Duration 78 ms    Q-T Interval 398 ms    QTC Calculation (Bezet) 400 ms    Calculated P Axis 79 degrees    Calculated R Axis 86 degrees    Calculated T Axis 81 degrees    Diagnosis       Normal sinus rhythm  Right atrial enlargement  When compared with ECG of 14-OCT-2020 17:11,  No significant change was found     URINALYSIS W/ RFLX MICROSCOPIC    Collection Time: 01/01/21  3:44 PM   Result Value Ref Range    Color YELLOW/STRAW      Appearance CLEAR CLEAR      Specific gravity >1.030     pH (UA) 8.5 (H) 5.0 - 8.0      Protein Negative NEG mg/dL    Glucose Negative NEG mg/dL    Ketone Negative NEG mg/dL    Bilirubin Negative NEG      Blood Negative NEG      Urobilinogen 0.2 0.2 - 1.0 EU/dL    Nitrites Negative NEG      Leukocyte Esterase Negative NEG     URINE CULTURE HOLD SAMPLE    Collection Time: 01/01/21  3:44 PM    Specimen: Serum   Result Value Ref Range    Urine culture hold        Urine on hold in Microbiology dept for 2 days.  If unpreserved urine is submitted, it cannot be used for addtional testing after 24 hours, recollection will be required. IMAGING:  XR CHEST PA LAT   Final Result   IMPRESSION: No acute process. No significant change from the prior study. CTA CODE NEURO HEAD AND NECK W CONT         CT CODE NEURO PERF W CBF         CT CODE NEURO HEAD WO CONTRAST   Final Result   IMPRESSION:    No acute intracranial process. NOTE: Examination was presented for interpretation at 2:00 PM.                  Medications During Visit:  Medications   iopamidoL (ISOVUE-370) 76 % injection (has no administration in time range)   iopamidoL (ISOVUE-370) 76 % injection 100 mL (80 mL IntraVENous Given 1/1/21 1343)   iopamidoL (ISOVUE-370) 76 % injection 50 mL (40 mL IntraVENous Given 1/1/21 1343)         DECISION MAKING:  Verito Gomez is a 76 y.o. female who comes in as above. Here, patient arrives with no symptoms. She was worried because her symptoms started in the same way her previous directed. Patient denied she is 0 and her stroke work-up is otherwise normal.  I spoke with teleneurology and was unimpressed with the patient's exam and imaging. At this time we both feel strongly that the patient be discharged home without any additional intervention here. She will call her specialist on Monday to arrange an outpatient MRI and follow-up with her primary care doctor on the seventh. Patient is understanding of this plan and does not want to remain in the hospital.  I feel she is safe for discharge home. Patient to return if symptoms return or worsen. All questions answered      IMPRESSION:  1.  Blurry vision, left eye        DISPOSITION:  Discharged      Discharge Medication List as of 1/1/2021  4:50 PM           Follow-up Information     Follow up With Specialties Details Why Contact Info    Benedicto Rose MD Internal Medicine   [address]   [address] [place] [address]  [phone]      Your Specialist Schedule an appointment as soon as possible for a visit               The patient is asked to follow-up with their primary care provider in the next several days. They are to call tomorrow for an appointment. The patient is asked to return promptly for any increased concerns or worsening of symptoms. They can return to this emergency department or any other emergency department.     Procedures

## 2021-01-03 LAB
ATRIAL RATE: 61 BPM
CALCULATED P AXIS, ECG09: 79 DEGREES
CALCULATED R AXIS, ECG10: 86 DEGREES
CALCULATED T AXIS, ECG11: 81 DEGREES
DIAGNOSIS, 93000: NORMAL
P-R INTERVAL, ECG05: 152 MS
Q-T INTERVAL, ECG07: 398 MS
QRS DURATION, ECG06: 78 MS
QTC CALCULATION (BEZET), ECG08: 400 MS
VENTRICULAR RATE, ECG03: 61 BPM

## 2021-01-07 ENCOUNTER — OFFICE VISIT (OUTPATIENT)
Dept: INTERNAL MEDICINE CLINIC | Age: 76
End: 2021-01-07
Payer: MEDICARE

## 2021-01-07 VITALS
WEIGHT: 129.6 LBS | SYSTOLIC BLOOD PRESSURE: 121 MMHG | TEMPERATURE: 97.3 F | HEIGHT: 68 IN | HEART RATE: 80 BPM | BODY MASS INDEX: 19.64 KG/M2 | DIASTOLIC BLOOD PRESSURE: 79 MMHG | RESPIRATION RATE: 16 BRPM | OXYGEN SATURATION: 98 %

## 2021-01-07 DIAGNOSIS — K21.9 GASTROESOPHAGEAL REFLUX DISEASE WITHOUT ESOPHAGITIS: ICD-10-CM

## 2021-01-07 DIAGNOSIS — E78.00 HYPERCHOLESTEROLEMIA: ICD-10-CM

## 2021-01-07 DIAGNOSIS — R19.7 DIARRHEA, UNSPECIFIED TYPE: Primary | ICD-10-CM

## 2021-01-07 DIAGNOSIS — Z86.73 HISTORY OF CVA (CEREBROVASCULAR ACCIDENT): ICD-10-CM

## 2021-01-07 DIAGNOSIS — I67.2 INTRACRANIAL ATHEROSCLEROSIS: ICD-10-CM

## 2021-01-07 PROCEDURE — G0463 HOSPITAL OUTPT CLINIC VISIT: HCPCS | Performed by: INTERNAL MEDICINE

## 2021-01-07 PROCEDURE — G8427 DOCREV CUR MEDS BY ELIG CLIN: HCPCS | Performed by: INTERNAL MEDICINE

## 2021-01-07 PROCEDURE — G8399 PT W/DXA RESULTS DOCUMENT: HCPCS | Performed by: INTERNAL MEDICINE

## 2021-01-07 PROCEDURE — 99214 OFFICE O/P EST MOD 30 MIN: CPT | Performed by: INTERNAL MEDICINE

## 2021-01-07 PROCEDURE — 1090F PRES/ABSN URINE INCON ASSESS: CPT | Performed by: INTERNAL MEDICINE

## 2021-01-07 PROCEDURE — 1101F PT FALLS ASSESS-DOCD LE1/YR: CPT | Performed by: INTERNAL MEDICINE

## 2021-01-07 PROCEDURE — 3017F COLORECTAL CA SCREEN DOC REV: CPT | Performed by: INTERNAL MEDICINE

## 2021-01-07 PROCEDURE — G8536 NO DOC ELDER MAL SCRN: HCPCS | Performed by: INTERNAL MEDICINE

## 2021-01-07 PROCEDURE — G8420 CALC BMI NORM PARAMETERS: HCPCS | Performed by: INTERNAL MEDICINE

## 2021-01-07 PROCEDURE — G8510 SCR DEP NEG, NO PLAN REQD: HCPCS | Performed by: INTERNAL MEDICINE

## 2021-01-07 RX ORDER — ROSUVASTATIN CALCIUM 10 MG/1
10 TABLET, COATED ORAL
Qty: 90 TAB | Refills: 1 | Status: SHIPPED | OUTPATIENT
Start: 2021-01-07 | End: 2021-07-06

## 2021-01-07 NOTE — PROGRESS NOTES
Merna Donato is a 76 y.o. female who was seen today for a follow-up visit. Assessment & Plan:     Diagnoses and all orders for this visit:    1. Diarrhea, unspecified type  Resolved now and etiology may have been either the Plavix or the Crestor although I suspect it is the Plavix and she was having a colitis. We will need to monitor this with restart of her Crestor. Reviewed negative stool studies. 2. Intracranial atherosclerosis  Reviewed recent notes from ER visit and had a episode of blurred vision she was concerned about and had CTA performed which showed no evidence of large vessel arterial occlusion and no significant perfusion abnormality. She has not had any recurrence of symptoms. Referred to neurology for further evaluation and management. Continue with aspirin daily. 3. History of CVA (cerebrovascular accident)  4. Gastroesophageal reflux disease without esophagitis  Prilosec as needed. 5. Hypercholesterolemia  Reviewed goals for LDL given intracranial atherosclerosis. Will retrial her Crestor. Check lab work and follow-up in 6 weeks. Reviewed potential side effects and if she has any muscle aches or problems she will contact me. -     AST; Future  -     ALT; Future  -     LIPID PANEL; Future    Other orders  -     rosuvastatin (CRESTOR) 10 mg tablet; Take 1 Tab by mouth nightly. Follow-up and Dispositions    · Return in about 6 weeks (around 2/18/2021), or if symptoms worsen or fail to improve. Gopi Flynn Retrial of crestor. Follow-up 6 weeks  Subjective:   Merna Donato was seen for Follow-up:   1. Diarrhea: Resolved within a couple of days of stopping Crestor and Plavix. 2.  Episode of blurred vision. Reviewed ER notes and with history of intracranial atherosclerosis CTA was performed which showed no acute events. She was discharged home with aspirin as she was previously on. Has not been seen by neurology since ER visit and no further episodes of blurred vision.     3.gerd has improved and now only 1 x over the past 2 weeks and resolved with dose of omeprazole. ROS - per HPI    Physical Exam  Vitals signs and nursing note reviewed. Constitutional:       General: She is not in acute distress. Appearance: She is well-developed. HENT:      Head: Normocephalic and atraumatic. Cardiovascular:      Rate and Rhythm: Normal rate and regular rhythm. Pulmonary:      Effort: Pulmonary effort is normal.      Breath sounds: Normal breath sounds. Abdominal:      General: Bowel sounds are normal.      Palpations: Abdomen is soft. Tenderness: There is no abdominal tenderness. Musculoskeletal:      Right lower leg: No edema. Left lower leg: No edema. Neurological:      General: No focal deficit present. Psychiatric:         Mood and Affect: Mood normal.       Visit Vitals  /79 (BP 1 Location: Right arm, BP Patient Position: Sitting)   Pulse 80   Temp 97.3 °F (36.3 °C) (Temporal)   Resp 16   Ht 5' 8\" (1.727 m)   Wt 129 lb 9.6 oz (58.8 kg)   SpO2 98%   BMI 19.71 kg/m²          Aspects of this note may have been generated using voice recognition software. Despite editing, there may be some syntax errors   I have discussed the diagnosis with the patient and the intended plan as seen in the above orders. The patient has received an after-visit summary and questions were answered concerning future plans. I have discussed any recommended medication side effects and warnings with the patient as well.   She has expressed understanding of the diagnosis and plan    Maykel Leon MD

## 2021-01-24 NOTE — PROGRESS NOTES
Neurology Follow-up  Nini Seaman NP      Visit Date: February 1, 2021    Patient: Osmany Lee MRN: [id number]  SSN: [id number]    YOB: 1945  Age: 76 y.o. Sex: female      PCP: Nando Banda MD      Previous records (physician notes, laboratory reports, and radiology reports) and imaging studies were reviewed and summarized. Subjective:     HPI: Osmany Lee is a 76 y.o. female with a pmh of hypothyroid, GERD, HSV, presented 10/14/20 to the ED for changes in her vision and self reported 3 episodes of LLE weakness occurring 3-4 days prior to admit lasting <30 seconds. She also reports she has had daily episodes of colored lights flashing in lateral half of vision on the left x 1 week. Ophthalmology found no abnormalities on eye exam. Recent MRA brain showed moderate to severe stenosis P1 segment on the right. In ED, CTH negative for acute process. CTA demonstrated severe focal stenosis of the right P2 segment. MRI brain showed few small acute infarcts in the right occipital lobe, likely related to the PCA stenosis. Pt felt that she had returned to her baseline and that the weakness in her left leg had resolved, but still has visual disturbance, described as swirling light. Pt not on antiplatelet or anticoagulation PTA. NSR. In March 2019 had 24 hr Holter for syncopal episode without concerning findings. Discharged on  mg daily, Plavix 75, both of which are therapeutic by assay and rosuvastatin 20 mg qhs. , A1C 5.9. Still with visual disturbance. 11/17/2020. F/u with Татьяна Ware NP. No recurrence of weakness. Visual disturbance resolved. Visual fields intact. Nonfocal exam. Reminded to stay on  mg and Plavix until completed 3 months of Plavix per neuro interventional surgery. Then can go to aspirin alone. No significant bruising, or SE with rosuvastatin. 1/1/21.  Pt presented to the ED for acute onset blurry vision in the left eye. Patient described it as a wavy sensation that resolved after about an hour. Patient in October had a TIA/stroke where she had similar onset of symptoms but also had weakness in the upper and lower extremity on the right side. Pt was concerned because this is the same progression of symptoms that she had and last time she \"waited too long. \" Recently she was taken off Plavix and Crestor due to diarrhea. CT, CTP and CTA H&N unremarkable except for unchanged severe focal stenosis in the right P2 segment.     1/9/21. Saw PCP. Restarted on Crestor 10 mg. Referred to neurology for episode on 1/1. Interval History:     Pt presents today, 02/01/21. As above, patient had a recurrence of the visual disturbance in the left eye, described as wavy lines, that resolved within an hour. She was at lunch at the time. There were no other symptoms. Visual disturbance resolved within an hour. She went to the ED at a concern of a new stroke and did not want to delay like she did last time. CT imaging negative. Unchanged severe focal stenosis in the right P2 segment. Patient had another occurrence on 1/9/2021. She woke up from sleep and had no visual disturbance that lasted briefly 10 minutes. She told her PCP and he referred her back to neurology for checkup. These are the only 2 occurrences. She is currently on 162 mg of aspirin daily and Crestor 10 mg daily for stroke prevention. She is not on any antihypertensives. Review of systems  Review of systems negative except as detailed in the HPI, interval, PMH and A&P        Past Medical History:   Diagnosis Date    Acute ischemic stroke ([address] 10/14/2020    Cancer ([address] 2016    basal cell skin carcinoma removed.     Cerebral artery occlusion with cerebral infarction ([address] 10/2020    Gastroesophageal reflux disease without esophagitis 8/18/2016    Herpes     right gluteal outbreaks    Osteoporosis     Snoring     Thyroid disease     hypothyroidism  Venous malformation 2015    Left foot and has had embolization procedure.  Vision decreased      Past Surgical History:   Procedure Laterality Date    HX DILATION AND CURETTAGE      HX OTHER SURGICAL  2015    emobolization of left foot for vascular malformation      Family History   Problem Relation Age of Onset    Hypertension Mother     Cancer Father     Heart Disease Father         cad    Cancer Sister 72        breast    Breast Cancer Sister 59     Social History     Tobacco Use    Smoking status: Former Smoker     Quit date: 1960     Years since quittin.2    Smokeless tobacco: Never Used   Substance Use Topics    Alcohol use: No      Current Outpatient Medications   Medication Sig Dispense Refill    rosuvastatin (CRESTOR) 10 mg tablet Take 1 Tab by mouth nightly. 90 Tab 1    levothyroxine (SYNTHROID) 88 mcg tablet Take 1 Tab by mouth Daily (before breakfast). 90 Tab 0    aspirin 81 mg chewable tablet Take 2 Tabs by mouth daily. Indications: stroke prevention 30 Tab 0    CALCIUM CARBONATE/VITAMIN D3 (CALCIUM 600 + D PO) Take 1 Tab by mouth two (2) times a day.  multivitamins-minerals-lutein (CENTRUM SILVER) Tab Take  by mouth.  valacyclovir HCl (VALTREX PO) Take  by mouth as needed.  tretinoin (RETIN-A) 0.05 % topical cream Apply  to affected area daily as needed.  omeprazole (PRILOSEC) 20 mg capsule Take 20 mg by mouth daily as needed (\"if I take advil\"). Allergies   Allergen Reactions    Indocin [Indomethacin Sodium] Shortness of Breath and Swelling     Ankles & legs    Boniva [Ibandronate] Other (comments)     indigestion    Fosamax [Alendronate] Other (comments)     Indigestion            Objective:      Visit Vitals  /70   Pulse 90   Resp 16   Ht 5' 8\" (1.727 m)   Wt 129 lb (58.5 kg)   SpO2 98%   BMI 19.61 kg/m²        General: No distress. Well groomed  Heart: Regular rate and rhythm.   Lungs: Unlabored  Musculoskeletal: Extremities w/o edema or muscle wasting   Skin: Warm dry skin  Psych: Good mood and affect     Neurologic Exam:    Mental Status:  Alert and oriented x 4. Normal processing  Coherent conversation and responded appropriately  Able to follow directions without difficulty     Language:    Normal fluency and comprehension    Cranial Nerves:   Pupils equal, round and reactive to light. Visual fields intact. Extraocular movements intact w/o nystagmus      Facial sensation intact to LT. Facial activation symmetric. Hearing intact bilaterally. No dysarthria. Shoulder shrug 5/5 bilaterally. Motor:    Bulk and tone normal.      5/5 strength in all extremities. No pronator drift. No involuntary movements, resting or intention tremor    Sensation:    Sensation intact throughout to light touch    Coordination & Gait: No ataxia with finger to nose. No Romberg. Gait normal      MMSE  No flowsheet data found.      PHQ  3 most recent PHQ Screens 2/1/2021   Little interest or pleasure in doing things Not at all   Feeling down, depressed, irritable, or hopeless Not at all   Total Score PHQ 2 0       Lab Results   Component Value Date/Time    WBC 5.3 01/01/2021 01:35 PM    HCT 39.3 01/01/2021 01:35 PM    HGB 12.3 01/01/2021 01:35 PM    PLATELET 333 83/59/3019 01:35 PM       Lab Results   Component Value Date/Time    Sodium 138 01/01/2021 01:35 PM    Potassium 4.0 01/01/2021 01:35 PM    Chloride 104 01/01/2021 01:35 PM    CO2 33 (H) 01/01/2021 01:35 PM    Glucose 152 (H) 01/01/2021 01:35 PM    BUN 8 01/01/2021 01:35 PM    Creatinine 0.83 01/01/2021 01:35 PM    Calcium 9.9 01/01/2021 01:35 PM       No components found for: TROPQUANT,  SGOT,  GPT,  ALT,  AP,  TBIL,  TBILI,  TP,  ALB,  GLOB,  GGT,  AML,  AMYP,  LPSE,  HLPSE    No results found for: JOSUÉ    Lab Results   Component Value Date/Time    Hemoglobin A1c 5.9 (H) 10/15/2020 01:09 AM        No results found for: B12LT, FOL, RBCF    No results found for: Farrah MOSES, GISSELL    Lab Results   Component Value Date/Time    Cholesterol, total 103 12/24/2020 09:21 AM    HDL Cholesterol 48 12/24/2020 09:21 AM    LDL, calculated 36.6 12/24/2020 09:21 AM    VLDL, calculated 18.4 12/24/2020 09:21 AM    Triglyceride 92 12/24/2020 09:21 AM    CHOL/HDL Ratio 2.1 12/24/2020 09:21 AM       XR Results   Results from Hospital Encounter encounter on 01/01/21   XR CHEST PA LAT    Impression IMPRESSION: No acute process. No significant change from the prior study. Results from [place] encounter on 10/14/20   XR CHEST PA LAT    Impression IMPRESSION: No acute cardiopulmonary disease. Results from [place] encounter on 09/04/09   XR LUMBAR SPINE 2 OR 3 VIEWS       CT Results:  Results from [place] encounter on 01/01/21   CT CODE NEURO PERF W CBF    Narrative *PRELIMINARY REPORT*    No acute thrombosis or significant intracranial stenosis. No significant  perfusion defect. Preliminary report was provided by Dr. Alexis Hernández, the on-call radiologist, at 2:09  PM.    Final report to follow. *END PRELIMINARY REPORT*    FINAL REPORT:    CLINICAL HISTORY: Code stroke    EXAMINATION:  CT ANGIOGRAPHY HEAD AND NECK     COMPARISON: CT head 1/1/2021, CTA 10/14/2020    TECHNIQUE:  Following the uneventful administration of iodinated contrast  material, axial CT angiography of the head and neck was performed. Delayed axial  images through the head were also obtained. Coronal and sagittal reconstructions  were obtained. Manual postprocessing of images was performed. 3-D  Sagittal  maximal intensity projection images were obtained. 3-D Coronal maximal  intensity projections were obtained. CT dose reduction was achieved through use  of a standardized protocol tailored for this examination and automatic exposure  control for dose modulation.  CT perfusion analysis was performed using CT with  contrast administration, including postprocessing of parametric maps with the  determination of cerebral blood flow, cerebral blood volume, and mean transit  time. FINDINGS:    Delayed contrast-enhanced head CT:    The ventricles are midline without hydrocephalus. There is no acute intra or  extra-axial hemorrhage. Mild bilateral subcortical and periventricular areas of  patchy low attenuation is nonspecific but likely related to changes of chronic  small vessel ischemic disease. The basal cisterns are clear. The paranasal  sinuses are clear. CTA NECK:    Great vessels: Patent    Right subclavian artery: Patent    Left subclavian artery: Patent    Right common carotid artery: Patent    Left common carotid artery: Patent    Cervical right internal carotid artery: Patent with mild proximal  atherosclerosis causing less than 50% stenosis by NASCET criteria. Cervical left internal carotid artery: Patent with mild proximal atherosclerosis  causing less than 50% stenosis by NASCET criteria. Right vertebral artery: Patent    Left vertebral artery: Patent    Mild to moderate cervical spondylosis. Unchanged bilateral upper lobe lung  opacities with are chronic and likely scarring    CTA HEAD:    Right cavernous internal carotid artery: Patent with mild atherosclerosis    Left cavernous internal carotid artery: Patent with mild atherosclerosis    Anterior cerebral arteries: Patent with mild atherosclerosis. Right A1  fenestration    Anterior communicating artery: Patent    Right middle cerebral artery: Patent with mild atherosclerosis    Left middle cerebral artery: Patent with mild atherosclerosis    Posterior communicating arteries: Hypoplastic    Posterior cerebral arteries: Patent with unchanged severe focal stenosis in the  right P2 segment    Basilar artery: Patent    Distal vertebral arteries: Patent    CT perfusion brain: No significant cerebral perfusion abnormality      Impression Impression:    No evidence for acute large vessel arterial occlusion.  No significant cerebral  perfusion abnormality. Stable exam                 CTA CODE NEURO HEAD AND NECK W CONT    Narrative *PRELIMINARY REPORT*    No acute thrombosis or significant intracranial stenosis. No significant  perfusion defect. Preliminary report was provided by Dr. Penny Burks, the on-call radiologist, at 2:09  PM.    Final report to follow. *END PRELIMINARY REPORT    FINAL REPORT:    CLINICAL HISTORY: Code stroke    EXAMINATION:  CT ANGIOGRAPHY HEAD AND NECK     COMPARISON: CT head 1/1/2021, CTA 10/14/2020    TECHNIQUE:  Following the uneventful administration of iodinated contrast  material, axial CT angiography of the head and neck was performed. Delayed axial  images through the head were also obtained. Coronal and sagittal reconstructions  were obtained. Manual postprocessing of images was performed. 3-D  Sagittal  maximal intensity projection images were obtained. 3-D Coronal maximal  intensity projections were obtained. CT dose reduction was achieved through use  of a standardized protocol tailored for this examination and automatic exposure  control for dose modulation. CT perfusion analysis was performed using CT with  contrast administration, including postprocessing of parametric maps with the  determination of cerebral blood flow, cerebral blood volume, and mean transit  time. FINDINGS:    Delayed contrast-enhanced head CT:    The ventricles are midline without hydrocephalus. There is no acute intra or  extra-axial hemorrhage. Mild bilateral subcortical and periventricular areas of  patchy low attenuation is nonspecific but likely related to changes of chronic  small vessel ischemic disease. The basal cisterns are clear. The paranasal  sinuses are clear.     CTA NECK:    Great vessels: Patent    Right subclavian artery: Patent    Left subclavian artery: Patent    Right common carotid artery: Patent    Left common carotid artery: Patent    Cervical right internal carotid artery: Patent with mild proximal  atherosclerosis causing less than 50% stenosis by NASCET criteria. Cervical left internal carotid artery: Patent with mild proximal atherosclerosis  causing less than 50% stenosis by NASCET criteria. Right vertebral artery: Patent    Left vertebral artery: Patent    Mild to moderate cervical spondylosis. Unchanged bilateral upper lobe lung  opacities with are chronic and likely scarring    CTA HEAD:    Right cavernous internal carotid artery: Patent with mild atherosclerosis    Left cavernous internal carotid artery: Patent with mild atherosclerosis    Anterior cerebral arteries: Patent with mild atherosclerosis. Right A1  fenestration    Anterior communicating artery: Patent    Right middle cerebral artery: Patent with mild atherosclerosis    Left middle cerebral artery: Patent with mild atherosclerosis    Posterior communicating arteries: Hypoplastic    Posterior cerebral arteries: Patent with unchanged severe focal stenosis in the  right P2 segment    Basilar artery: Patent    Distal vertebral arteries: Patent    CT perfusion brain: No significant cerebral perfusion abnormality      Impression Impression:    No evidence for acute large vessel arterial occlusion. No significant cerebral  perfusion abnormality. Stable exam                 CT CODE NEURO HEAD WO CONTRAST    Narrative EXAM: CT CODE NEURO HEAD WO CONTRAST    INDICATION: Code Stroke    COMPARISON: None. CONTRAST: None. TECHNIQUE: Unenhanced CT of the head was performed using 5 mm images. Brain and  bone windows were generated. Coronal and sagittal reformats. CT dose reduction  was achieved through use of a standardized protocol tailored for this  examination and automatic exposure control for dose modulation. FINDINGS:  The ventricles and sulci are normal in size, shape and configuration. . There is  no significant white matter disease. There is no intracranial hemorrhage,  extra-axial collection, or mass effect.  The basilar cisterns are open. No CT  evidence of acute infarct. The bone windows demonstrate no abnormalities. The visualized portions of the  paranasal sinuses and mastoid air cells are clear. Impression IMPRESSION:   No acute intracranial process. NOTE: Examination was presented for interpretation at 2:00 PM.           MRI Results:  Results from Hospital Encounter encounter on 10/14/20   MRI CODE NEURO BRAIN WO CONT    Narrative EXAM:  MRI CODE NEURO BRAIN WO CONT    INDICATION:    Right PCA stenosis; left visual field abnormalities and  intermittent left leg weakness; rule out stroke    COMPARISON:  CTA head neck 10/14/2020, MRA head 10/13/2020. CONTRAST: None. TECHNIQUE:    Multiplanar multisequence acquisition without contrast of the brain. FINDINGS:  Few small acute infarcts in the right occipital lobe. No evidence of acute  hemorrhage. The ventricles are normal in size and position. There is no extra-axial fluid  collection or mass effect. There is no cerebellar tonsillar herniation. Expected  arterial flow-voids are present. The paranasal sinuses, mastoid air cells, and middle ears are clear. The orbital  contents are within normal limits. No significant osseous or scalp lesions are  identified. Impression IMPRESSION:   1. Few small acute infarcts in the right occipital lobe. 23X         Results from [place] encounter on 10/13/20   MRA BRAIN WO CONT    Narrative Clinical history: Change in vision, transient change in vision  INDICATION:   Vision changes  COMPARISON: None  TECHNIQUE:  3-D time-of-flight MRA of the brain was performed. Multiplanar  reconstructions were obtained. FINDINGS:  The A2 segments are within normal limits. There are A1 segments bilaterally. Mild stenosis A1 segment on the right. Petrous and cavernous ICAs are within  normal limits. Mildly hypoplastic on the right. M1 segments are patent. Mild  stenosis M1 segment on the right.  Mild stenosis M1 segment on the left. The left  vertebral artery is smaller than the right vertebral artery. 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 P1 segments on the left are within normal limits. Moderate to severe  stenosis P1/P2 segment on the right. . The basilar artery and its branches are  normal. The internal carotid, anterior cerebral, and middle cerebral arteries  are patent. . There is no aneurysm. There are no sizable posterior communicating  arteries. Impression IMPRESSION:  Moderate to severe stenosis P1 segment on the right. Mild stenosis/hypoplasia of the A1 segment on the right. .    No major vessel occlusion or intracranial aneurysm. VAS/US Results (maximum last 3): No results found for this or any previous visit. TTE  10/14/20   ECHO ADULT COMPLETE 10/15/2020 10/15/2020    Narrative · Saline contrast was given to evaluate for intracardiac shunt. · LV: Estimated LVEF is 60 - 65%. Normal cavity size, wall thickness and   systolic function (ejection fraction normal). Wall motion: normal.        Signed by: Whit Tuttle MD         EKG  Results for orders placed or performed during the hospital encounter of 01/01/21   EKG, 12 LEAD, INITIAL   Result Value Ref Range    Ventricular Rate 61 BPM    Atrial Rate 61 BPM    P-R Interval 152 ms    QRS Duration 78 ms    Q-T Interval 398 ms    QTC Calculation (Bezet) 400 ms    Calculated P Axis 79 degrees    Calculated R Axis 86 degrees    Calculated T Axis 81 degrees    Diagnosis       Normal sinus rhythm  Right atrial enlargement  When compared with ECG of 14-OCT-2020 17:11,  No significant change was found  Confirmed by Loretta Damon ([id number]) on 1/3/2021 2:21:24 PM             Assessment/Plan:     Liborio Ngo is a 76 y.o. female with a few small acute infarcts in the right occipital lobe felt to be due to severe focal stenosis of the right P2 segment which occurred 10/14/2020. Pt was not on antiplatelet or anticoagulation PTA.   Discharged on a statin,  mg daily, Plavix 75. Both therapeutic by assay. Follow up visit 11/17/20, with no recurrence of weakness. Visual disturbance resolved. Visual fields intact. Nonfocal exam. Plan to continue stroke prevention with  mg daily, Plavix 75 mg daily and rosuvastatin 20 mg qhs and stop Plavix after 3 months. (Per [facility])  Pt went to the ED for 10 minute recurrence of similar visual symptoms to the one's she had with the stroke in Oct 2020. Imaging without acute process and unremarkable except for known severe R P2 stenosis. Plavix was stopped early for diarrhea. Crestor reduced to 10 mg daily.  mg daily continues. We discussed that stroke symptoms can recur particularly when rundown, sick, tired etc. In her case though they might also be brought on by low blood pressure, given the severe stenosis. Neurological exam is nonfocal.  Visual fields intact. Most likely recrudescence of stroke symptoms but may have been due to hypotension and hypoperfusion of territory distal to the R P2 stenosis. -Recommended getting a BP cuff to measure her blood pressures at home so she can see the variability in her blood pressures and to see if she does get low sometimes. - Change ASA to 81 mg bid  - Continue Crestor 10 mg  - Encouraged healthy diet and exercise. - Follow-up in 1 year, sooner if needed  - Instructed to call with questions or concerns.       Visit Diagnoses    ICD-10-CM ICD-9-CM    1. Visual disturbance as complication of stroke  C03.377 438.7     H53.9     2. History of stroke within last year  Z86.73 V12.54    3. Stenosis of intracranial vessel  I66.9 437.0    4.  Intracranial atherosclerosis  I67.2 437.0            Signed By: Philippe Sheehan NP     February 1, 2021 10:22 AM

## 2021-02-01 ENCOUNTER — OFFICE VISIT (OUTPATIENT)
Dept: NEUROLOGY | Age: 76
End: 2021-02-01
Payer: MEDICARE

## 2021-02-01 VITALS
HEIGHT: 68 IN | OXYGEN SATURATION: 98 % | WEIGHT: 129 LBS | DIASTOLIC BLOOD PRESSURE: 70 MMHG | BODY MASS INDEX: 19.55 KG/M2 | HEART RATE: 90 BPM | SYSTOLIC BLOOD PRESSURE: 128 MMHG | RESPIRATION RATE: 16 BRPM

## 2021-02-01 DIAGNOSIS — Z86.73 HISTORY OF STROKE WITHIN LAST YEAR: ICD-10-CM

## 2021-02-01 DIAGNOSIS — H53.9 VISUAL DISTURBANCE AS COMPLICATION OF STROKE: Primary | ICD-10-CM

## 2021-02-01 DIAGNOSIS — I69.398 VISUAL DISTURBANCE AS COMPLICATION OF STROKE: Primary | ICD-10-CM

## 2021-02-01 DIAGNOSIS — I66.9 STENOSIS OF INTRACRANIAL VESSEL: ICD-10-CM

## 2021-02-01 DIAGNOSIS — I67.2 INTRACRANIAL ATHEROSCLEROSIS: ICD-10-CM

## 2021-02-01 PROCEDURE — G8427 DOCREV CUR MEDS BY ELIG CLIN: HCPCS | Performed by: NURSE PRACTITIONER

## 2021-02-01 PROCEDURE — G8536 NO DOC ELDER MAL SCRN: HCPCS | Performed by: NURSE PRACTITIONER

## 2021-02-01 PROCEDURE — 1090F PRES/ABSN URINE INCON ASSESS: CPT | Performed by: NURSE PRACTITIONER

## 2021-02-01 PROCEDURE — 1101F PT FALLS ASSESS-DOCD LE1/YR: CPT | Performed by: NURSE PRACTITIONER

## 2021-02-01 PROCEDURE — G8420 CALC BMI NORM PARAMETERS: HCPCS | Performed by: NURSE PRACTITIONER

## 2021-02-01 PROCEDURE — 3017F COLORECTAL CA SCREEN DOC REV: CPT | Performed by: NURSE PRACTITIONER

## 2021-02-01 PROCEDURE — G8399 PT W/DXA RESULTS DOCUMENT: HCPCS | Performed by: NURSE PRACTITIONER

## 2021-02-01 PROCEDURE — G8432 DEP SCR NOT DOC, RNG: HCPCS | Performed by: NURSE PRACTITIONER

## 2021-02-01 PROCEDURE — 99215 OFFICE O/P EST HI 40 MIN: CPT | Performed by: NURSE PRACTITIONER

## 2021-02-01 RX ORDER — GUAIFENESIN 100 MG/5ML
81 LIQUID (ML) ORAL 2 TIMES DAILY
Qty: 60 TAB | Refills: 11 | Status: SHIPPED | OUTPATIENT
Start: 2021-02-01

## 2021-02-01 NOTE — PROGRESS NOTES
Ms. Ene Solorzano presents today to follow up episodes of visual disturbances. Depression screening done on this patient.

## 2021-02-01 NOTE — PATIENT INSTRUCTIONS
[facility]   Statement to Patients  April 1, 2014      In an effort to ensure the large volume of patient prescription refills is processed in the most efficient and expeditious manner, we are asking our patients to assist us by calling your Pharmacy for all prescription refills, this will include also your  Mail Order Pharmacy. The pharmacy will contact our office electronically to continue the refill process. Please do not wait until the last minute to call your pharmacy. We need at least 48 hours (2days) to fill prescriptions. We also encourage you to call your pharmacy before going to  your prescription to make sure it is ready. With regard to controlled substance prescription refill requests (narcotic refills) that need to be picked up at our office, we ask your cooperation by providing us with at least 72 hours (3days) notice that you will need a refill. We will not refill narcotic prescription refill requests after 4:00pm on any weekday, Monday through Thursday, or after 2:00pm on Fridays, or on the weekends. We encourage everyone to explore another way of getting your prescription refill request processed using [employer], our patient web portal through our electronic medical record system. [employer] is an efficient and effective way to communicate your medication request directly to the office and  downloadable as an sarika on your smart phone . [employer] also features a review functionality that allows you to view your medication list as well as leave messages for your physician. Are you ready to get connected? If so please review the attatched instructions or speak to any of our staff to get you set up right away! Thank you so much for your cooperation. Should you have any questions please contact our Practice Administrator.     The Physicians and Staff,  [facility]

## 2021-02-16 ENCOUNTER — OFFICE VISIT (OUTPATIENT)
Dept: INTERNAL MEDICINE CLINIC | Age: 76
End: 2021-02-16
Payer: MEDICARE

## 2021-02-16 VITALS
RESPIRATION RATE: 16 BRPM | WEIGHT: 133 LBS | DIASTOLIC BLOOD PRESSURE: 73 MMHG | HEART RATE: 62 BPM | TEMPERATURE: 97.3 F | BODY MASS INDEX: 20.22 KG/M2 | OXYGEN SATURATION: 97 % | SYSTOLIC BLOOD PRESSURE: 135 MMHG

## 2021-02-16 DIAGNOSIS — Z00.00 MEDICARE ANNUAL WELLNESS VISIT, SUBSEQUENT: Primary | ICD-10-CM

## 2021-02-16 DIAGNOSIS — I67.2 INTRACRANIAL ATHEROSCLEROSIS: ICD-10-CM

## 2021-02-16 DIAGNOSIS — E03.9 ACQUIRED HYPOTHYROIDISM: ICD-10-CM

## 2021-02-16 DIAGNOSIS — E78.00 HYPERCHOLESTEROLEMIA: ICD-10-CM

## 2021-02-16 DIAGNOSIS — Z86.73 HISTORY OF CVA (CEREBROVASCULAR ACCIDENT): ICD-10-CM

## 2021-02-16 DIAGNOSIS — K21.9 GASTROESOPHAGEAL REFLUX DISEASE WITHOUT ESOPHAGITIS: ICD-10-CM

## 2021-02-16 DIAGNOSIS — Z12.31 VISIT FOR SCREENING MAMMOGRAM: ICD-10-CM

## 2021-02-16 LAB
T4 FREE SERPL-MCNC: 1 NG/DL (ref 0.8–1.5)
TSH SERPL DL<=0.05 MIU/L-ACNC: 5.42 UIU/ML (ref 0.36–3.74)

## 2021-02-16 PROCEDURE — G8427 DOCREV CUR MEDS BY ELIG CLIN: HCPCS | Performed by: INTERNAL MEDICINE

## 2021-02-16 PROCEDURE — G8399 PT W/DXA RESULTS DOCUMENT: HCPCS | Performed by: INTERNAL MEDICINE

## 2021-02-16 PROCEDURE — G0439 PPPS, SUBSEQ VISIT: HCPCS | Performed by: INTERNAL MEDICINE

## 2021-02-16 PROCEDURE — 1101F PT FALLS ASSESS-DOCD LE1/YR: CPT | Performed by: INTERNAL MEDICINE

## 2021-02-16 PROCEDURE — G8536 NO DOC ELDER MAL SCRN: HCPCS | Performed by: INTERNAL MEDICINE

## 2021-02-16 PROCEDURE — G8420 CALC BMI NORM PARAMETERS: HCPCS | Performed by: INTERNAL MEDICINE

## 2021-02-16 PROCEDURE — 3017F COLORECTAL CA SCREEN DOC REV: CPT | Performed by: INTERNAL MEDICINE

## 2021-02-16 PROCEDURE — 1090F PRES/ABSN URINE INCON ASSESS: CPT | Performed by: INTERNAL MEDICINE

## 2021-02-16 PROCEDURE — G0463 HOSPITAL OUTPT CLINIC VISIT: HCPCS | Performed by: INTERNAL MEDICINE

## 2021-02-16 PROCEDURE — G8510 SCR DEP NEG, NO PLAN REQD: HCPCS | Performed by: INTERNAL MEDICINE

## 2021-02-16 PROCEDURE — 99214 OFFICE O/P EST MOD 30 MIN: CPT | Performed by: INTERNAL MEDICINE

## 2021-02-16 NOTE — ACP (ADVANCE CARE PLANNING)
====Advance Care Planning Invitation====    Patient was invited to begin or continue Advance Care Planning on this date.   Patient does not have ACP, she declines further information at this time

## 2021-02-16 NOTE — PROGRESS NOTES
This is the Subsequent Medicare Annual Wellness Exam, performed 12 months or more after the Initial AWV or the last Subsequent AWV    I have reviewed the patient's medical history in detail and updated the computerized patient record. Depression Risk Factor Screening:     3 most recent PHQ Screens 2/16/2021   Little interest or pleasure in doing things Not at all   Feeling down, depressed, irritable, or hopeless Not at all   Total Score PHQ 2 0       Alcohol Risk Screen    Do you average more than 1 drink per night or more than 7 drinks a week:  No    On any one occasion in the past three months have you have had more than 3 drinks containing alcohol:  No        Functional Ability and Level of Safety:    Hearing: decreased hearing left ear. Activities of Daily Living: The home contains: no safety equipment. Patient does total self care      Ambulation: with no difficulty     Fall Risk:  Fall Risk Assessment, last 12 mths 2/16/2021   Able to walk? Yes   Fall in past 12 months? 0   Do you feel unsteady? 0   Are you worried about falling 1   Number of falls in past 12 months -   Fall with injury?  -      Abuse Screen:  Patient is not abused       Cognitive Screening    Has your family/caregiver stated any concerns about your memory: no     Cognitive Screening: normal    Assessment/Plan   Education and counseling provided:  Are appropriate based on today's review and evaluation        Health Maintenance Due     Health Maintenance Due   Topic Date Due    COVID-19 Vaccine (1 of 2) 05/29/1961    Shingrix Vaccine Age 50> (1 of 2) 05/29/1995       Patient Care Team   Patient Care Team:  Chandrika Trotter MD as PCP - General  [place] Clarence Tejeda MD as PCP - [facility] Empaneled Provider  Sidra Adames MD (Inactive) as Physician (Ophthalmology)  Billy Valenzuela MD as Physician (Obstetrics & Gynecology)  Minerva Gee MD (Cardiology)    History     Patient Active Problem List   Diagnosis Code    Hypercholesterolemia E78.00    Hypothyroidism E03.9    Osteopenia M85.80    Venous malformation Q27.9    Advance directive discussed with patient Z70.80    Gastroesophageal reflux disease without esophagitis K21.9    Intracranial atherosclerosis I67.2    History of CVA (cerebrovascular accident) Z86.73     Past Medical History:   Diagnosis Date    Acute ischemic stroke ([facility] [address] 10/14/2020    Cancer ([facility] [address] 2016    basal cell skin carcinoma removed.  Cerebral artery occlusion with cerebral infarction ([facility] [address] 10/2020    Gastroesophageal reflux disease without esophagitis 8/18/2016    Herpes     right gluteal outbreaks    Osteoporosis     Snoring     Thyroid disease     hypothyroidism    Venous malformation 5/19/2015    Left foot and has had embolization procedure.  Vision decreased       Past Surgical History:   Procedure Laterality Date    HX DILATION AND CURETTAGE      HX OTHER SURGICAL  03/2015    emobolization of left foot for vascular malformation     Current Outpatient Medications   Medication Sig Dispense Refill    aspirin 81 mg chewable tablet Take 1 Tab by mouth two (2) times a day. Indications: stroke prevention 60 Tab 11    rosuvastatin (CRESTOR) 10 mg tablet Take 1 Tab by mouth nightly. 90 Tab 1    levothyroxine (SYNTHROID) 88 mcg tablet Take 1 Tab by mouth Daily (before breakfast). 90 Tab 0    omeprazole (PRILOSEC) 20 mg capsule Take 20 mg by mouth daily as needed (\"if I take advil\").  CALCIUM CARBONATE/VITAMIN D3 (CALCIUM 600 + D PO) Take 1 Tab by mouth two (2) times a day.  multivitamins-minerals-lutein (CENTRUM SILVER) Tab Take  by mouth.  valacyclovir HCl (VALTREX PO) Take  by mouth as needed.  tretinoin (RETIN-A) 0.05 % topical cream Apply  to affected area daily as needed.        Allergies   Allergen Reactions    Indocin [Indomethacin Sodium] Shortness of Breath and Swelling     Ankles & legs    Boniva [Ibandronate] Other (comments)     indigestion    Fosamax [Alendronate] Other (comments)     Indigestion         Family History   Problem Relation Age of Onset    Hypertension Mother     Cancer Father     Heart Disease Father         cad    Cancer Sister 72        breast    Breast Cancer Sister 59     Social History     Tobacco Use    Smoking status: Former Smoker     Quit date: 1960     Years since quittin.1    Smokeless tobacco: Never Used   Substance Use Topics    Alcohol use: No       Assessment & Plan:   Diagnoses and all orders for this visit:    1. Medicare annual wellness visit, subsequent  Health maintenance reviewed and updated with patient today at visit. 2. Visit for screening mammogram  -     [facility] 3D EMMANUEL W MAMMO BI SCREENING INCL CAD; Future    3. Acquired hypothyroidism  Continue current medication. [address] lab work. -     TSH 3RD GENERATION; Future  -     T4, FREE; Future    4. History of CVA (cerebrovascular accident)  Reviewed recent neurology note and will continue with aspirin 181 mg daily and statin for management. She has not been taking her blood pressures. She is trying to hydrate well. 5. Intracranial atherosclerosis  Statin as above  6. Hypercholesterolemia  Tolerating statin well and reviewed recent lab work at goal LDL. 7. Gastroesophageal reflux disease without esophagitis    Well-controlled continue medication as needed    . Orders Placed This Encounter    [facility] 3D EMMANUEL W MAMMO BI SCREENING INCL CAD    TSH 3RD GENERATION    T4, FREE        Subjective:   Akua Samaniego was seen for follow-up of CVA, hypercholesterolemia, hypothyroid, GERD. Has had her first COVID vaccine last week. She is tolerating cholesterol agent well with no side effects. She is tolerating decrease in her levothyroxine and is not having any diarrhea. She is no longer on Plavix as it was felt this might be causing her diarrhea. Her GERD symptoms are well controlled with Prilosec.   She is trying to hydrate well and eat a healthy diet.    Review of Systems   Constitutional: Negative for fever and malaise/fatigue. HENT: Positive for hearing loss (left ear decreased hearing). Respiratory: Negative for shortness of breath. Cardiovascular: Negative for chest pain and leg swelling. Gastrointestinal: Negative for abdominal pain, blood in stool, constipation, diarrhea and nausea. Genitourinary: Negative for dysuria and urgency. Musculoskeletal: Negative for joint pain. Neurological: Positive for dizziness (Sometimes lightheadedness when she first stands. ). Negative for sensory change, speech change, focal weakness and headaches. Psychiatric/Behavioral: Negative for depression.    - per HPI    Physical Exam  Vitals signs and nursing note reviewed. Constitutional:       General: She is not in acute distress. Appearance: She is well-developed. HENT:      Head: Normocephalic and atraumatic. Right Ear: Tympanic membrane normal.      Left Ear: Tympanic membrane normal.      Nose: Nose normal.      Mouth/Throat:      Mouth: Mucous membranes are moist.      Pharynx: No posterior oropharyngeal erythema. Eyes:      Extraocular Movements: Extraocular movements intact. Pupils: Pupils are equal, round, and reactive to light. Neck:      Thyroid: No thyromegaly. Vascular: No carotid bruit. Cardiovascular:      Rate and Rhythm: Normal rate and regular rhythm. Heart sounds: Normal heart sounds. No murmur. Pulmonary:      Effort: Pulmonary effort is normal.      Breath sounds: Normal breath sounds. No wheezing. Abdominal:      General: Bowel sounds are normal. There is no distension. Palpations: Abdomen is soft. There is no mass. Tenderness: There is no abdominal tenderness. Musculoskeletal:      Right lower leg: No edema. Left lower leg: No edema. Skin:     Findings: No rash. Neurological:      General: No focal deficit present.    Psychiatric:         Mood and Affect: Mood normal. Visit Vitals  /73 (BP 1 Location: Left arm, BP Patient Position: Sitting, BP Cuff Size: Adult)   Pulse 62   Temp 97.3 °F (36.3 °C) (Temporal)   Resp 16   Wt 133 lb (60.3 kg)   SpO2 97%   BMI 20.22 kg/m²          Aspects of this note may have been generated using voice recognition software. Despite editing, there may be some syntax errors   I have discussed the diagnosis with the patient and the intended plan as seen in the above orders. The patient has received an after-visit summary and questions were answered concerning future plans. I have discussed any recommended medication side effects and warnings with the patient as well.   She has expressed understanding of the diagnosis and plan    Slick Simms MD

## 2021-02-16 NOTE — PATIENT INSTRUCTIONS
Medicare Wellness Visit, Female The best way to live healthy is to have a lifestyle where you eat a well-balanced diet, exercise regularly, limit alcohol use, and quit all forms of tobacco/nicotine, if applicable. Regular preventive services are another way to keep healthy. Preventive services (vaccines, screening tests, monitoring & exams) can help personalize your care plan, which helps you manage your own care. Screening tests can find health problems at the earliest stages, when they are easiest to treat. [facility] follows the current, evidence-based guidelines published by the [facility] ([facility]) when recommending preventive services for our patients. Because we follow these guidelines, sometimes recommendations change over time as research supports it. (For example, mammograms used to be recommended annually. Even though Medicare will still pay for an annual mammogram, the newer guidelines recommend a mammogram every two years for women of average risk). Of course, you and your doctor may decide to screen more often for some diseases, based on your risk and your co-morbidities (chronic disease you are already diagnosed with). Preventive services for you include: - Medicare offers their members a free annual wellness visit, which is time for you and your primary care provider to discuss and plan for your preventive service needs. Take advantage of this benefit every year! 
-All adults over the age of 72 should receive the recommended pneumonia vaccines. Current USPSTF guidelines recommend a series of two vaccines for the best pneumonia protection.  
-All adults should have a flu vaccine yearly and a tetanus vaccine every 10 years.  
-All adults age 48 and older should receive the shingles vaccines (series of two vaccines). -All adults age 38-68 who are overweight should have a diabetes screening test once every three years. -All adults born between [birth year] and 1965 should be screened once for Hepatitis C. 
-Other screening tests and preventive services for persons with diabetes include: an eye exam to screen for diabetic retinopathy, a kidney function test, a foot exam, and stricter control over your cholesterol.  
-Cardiovascular screening for adults with routine risk involves an electrocardiogram (ECG) at intervals determined by your doctor.  
-Colorectal cancer screenings should be done for adults age 54-65 with no increased risk factors for colorectal cancer. There are a number of acceptable methods of screening for this type of cancer. Each test has its own benefits and drawbacks. Discuss with your doctor what is most appropriate for you during your annual wellness visit. The different tests include: colonoscopy (considered the best screening method), a fecal occult blood test, a fecal DNA test, and sigmoidoscopy. 
 
-A bone mass density test is recommended when a woman turns 65 to screen for osteoporosis. This test is only recommended one time, as a screening. Some providers will use this same test as a disease monitoring tool if you already have osteoporosis. -Breast cancer screenings are recommended every other year for women of normal risk, age 54-69. 
-Cervical cancer screenings for women over age 72 are only recommended with certain risk factors. Here is a list of your current Health Maintenance items (your personalized list of preventive services) with a due date: 
Health Maintenance Due Topic Date Due  
 COVID-19 Vaccine (1 of 2) 05[birth year]29/1961  Shingles Vaccine (1 of 2) 05/29/1995

## 2021-02-16 NOTE — PROGRESS NOTES
Verified name and birth date for privacy precautions. Chart reviewed in preparation for today's visit. Chief Complaint   Patient presents with    Annual Wellness Visit          Health Maintenance Due   Topic    COVID-19 Vaccine (1 of 2)    Shingrix Vaccine Age 50> (1 of 2)    Medicare Yearly Exam          Wt Readings from Last 3 Encounters:   02/16/21 133 lb (60.3 kg)   02/01/21 129 lb (58.5 kg)   01/07/21 129 lb 9.6 oz (58.8 kg)     Temp Readings from Last 3 Encounters:   02/16/21 97.3 °F (36.3 °C) (Temporal)   01/07/21 97.3 °F (36.3 °C) (Temporal)   01/01/21 97.8 °F (36.6 °C)     BP Readings from Last 3 Encounters:   02/16/21 135/73   02/01/21 128/70   01/07/21 121/79     Pulse Readings from Last 3 Encounters:   02/16/21 62   02/01/21 90   01/07/21 80         Learning Assessment:  :     Learning Assessment 2/1/2021 11/2/2020 5/19/2015 3/18/2014   PRIMARY LEARNER Patient Patient Patient Patient   HIGHEST LEVEL OF EDUCATION - PRIMARY LEARNER  - - 2 YEARS OF COLLEGE 2 YEARS OF COLLEGE   BARRIERS PRIMARY LEARNER - - NONE NONE   CO-LEARNER CAREGIVER - - - No   PRIMARY LANGUAGE ENGLISH ENGLISH ENGLISH ENGLISH   LEARNER PREFERENCE PRIMARY LISTENING READING READING READING   ANSWERED BY patient patient patient patient   RELATIONSHIP SELF SELF SELF SELF       Depression Screening:  :     3 most recent PHQ Screens 2/16/2021   Little interest or pleasure in doing things Not at all   Feeling down, depressed, irritable, or hopeless Not at all   Total Score PHQ 2 0       Fall Risk Assessment:  :     Fall Risk Assessment, last 12 mths 2/16/2021   Able to walk? Yes   Fall in past 12 months? 0   Do you feel unsteady? 0   Are you worried about falling 1   Number of falls in past 12 months -   Fall with injury? -       Abuse Screening:  :     Abuse Screening Questionnaire 2/16/2021 11/14/2019   Do you ever feel afraid of your partner? N N   Are you in a relationship with someone who physically or mentally threatens you?  Vic Ruff N   Is it safe for you to go home?   Slider

## 2021-02-18 DIAGNOSIS — E03.9 ACQUIRED HYPOTHYROIDISM: Primary | ICD-10-CM

## 2021-02-18 NOTE — PROGRESS NOTES
[employer] message sent. T4 normal.  TSH mildly elevated. Patient clinically euthyroid and will continue with current thyroid medication and recheck thyroid lab work in 3 months.

## 2021-03-23 RX ORDER — LEVOTHYROXINE SODIUM 88 UG/1
TABLET ORAL
Qty: 90 TAB | Refills: 0 | Status: SHIPPED
Start: 2021-03-23 | End: 2021-05-21 | Stop reason: DRUGHIGH

## 2021-04-05 ENCOUNTER — HOSPITAL ENCOUNTER (OUTPATIENT)
Dept: MAMMOGRAPHY | Age: 76
Discharge: HOME | End: 2021-04-05
Attending: INTERNAL MEDICINE
Payer: MEDICARE

## 2021-04-05 DIAGNOSIS — Z12.31 VISIT FOR SCREENING MAMMOGRAM: ICD-10-CM

## 2021-04-05 PROCEDURE — 77063 BREAST TOMOSYNTHESIS BI: CPT

## 2021-05-21 DIAGNOSIS — E03.9 ACQUIRED HYPOTHYROIDISM: Primary | ICD-10-CM

## 2021-05-21 RX ORDER — LEVOTHYROXINE SODIUM 112 UG/1
112 TABLET ORAL
Qty: 90 TABLET | Refills: 0 | Status: SHIPPED | OUTPATIENT
Start: 2021-05-21 | End: 2021-07-12 | Stop reason: SDUPTHER

## 2021-07-06 RX ORDER — ROSUVASTATIN CALCIUM 10 MG/1
10 TABLET, COATED ORAL
Qty: 90 TABLET | Refills: 0 | Status: SHIPPED | OUTPATIENT
Start: 2021-07-06 | End: 2021-09-29

## 2021-07-12 DIAGNOSIS — E03.9 ACQUIRED HYPOTHYROIDISM: ICD-10-CM

## 2021-07-12 RX ORDER — LEVOTHYROXINE SODIUM 112 UG/1
112 TABLET ORAL
Qty: 90 TABLET | Refills: 3 | Status: SHIPPED | OUTPATIENT
Start: 2021-07-12 | End: 2022-05-06 | Stop reason: DRUGHIGH

## 2022-03-18 PROBLEM — Z86.73 HISTORY OF CVA (CEREBROVASCULAR ACCIDENT): Status: ACTIVE | Noted: 2020-12-24

## 2022-03-18 PROBLEM — I67.2 INTRACRANIAL ATHEROSCLEROSIS: Status: ACTIVE | Noted: 2020-11-02

## 2022-04-19 ENCOUNTER — TRANSCRIBE ORDER (OUTPATIENT)
Dept: SCHEDULING | Age: 77
End: 2022-04-19

## 2022-04-19 DIAGNOSIS — Z12.31 SCREENING MAMMOGRAM FOR HIGH-RISK PATIENT: Primary | ICD-10-CM

## 2022-04-28 ENCOUNTER — OFFICE VISIT (OUTPATIENT)
Dept: INTERNAL MEDICINE CLINIC | Age: 77
End: 2022-04-28
Payer: MEDICARE

## 2022-04-28 VITALS
OXYGEN SATURATION: 99 % | TEMPERATURE: 97.5 F | DIASTOLIC BLOOD PRESSURE: 71 MMHG | WEIGHT: 131.4 LBS | HEART RATE: 67 BPM | BODY MASS INDEX: 19.91 KG/M2 | RESPIRATION RATE: 18 BRPM | SYSTOLIC BLOOD PRESSURE: 129 MMHG | HEIGHT: 68 IN

## 2022-04-28 DIAGNOSIS — Z86.73 HISTORY OF CVA (CEREBROVASCULAR ACCIDENT): ICD-10-CM

## 2022-04-28 DIAGNOSIS — R29.898 WEAKNESS OF BOTH LOWER EXTREMITIES: ICD-10-CM

## 2022-04-28 DIAGNOSIS — Z00.00 MEDICARE ANNUAL WELLNESS VISIT, SUBSEQUENT: Primary | ICD-10-CM

## 2022-04-28 DIAGNOSIS — E03.9 ACQUIRED HYPOTHYROIDISM: ICD-10-CM

## 2022-04-28 DIAGNOSIS — Z12.31 VISIT FOR SCREENING MAMMOGRAM: ICD-10-CM

## 2022-04-28 DIAGNOSIS — M85.80 OSTEOPENIA, UNSPECIFIED LOCATION: ICD-10-CM

## 2022-04-28 DIAGNOSIS — K21.9 GASTROESOPHAGEAL REFLUX DISEASE WITHOUT ESOPHAGITIS: ICD-10-CM

## 2022-04-28 DIAGNOSIS — Z78.0 POSTMENOPAUSAL: ICD-10-CM

## 2022-04-28 DIAGNOSIS — E78.00 HYPERCHOLESTEROLEMIA: ICD-10-CM

## 2022-04-28 PROCEDURE — G8536 NO DOC ELDER MAL SCRN: HCPCS | Performed by: INTERNAL MEDICINE

## 2022-04-28 PROCEDURE — 99214 OFFICE O/P EST MOD 30 MIN: CPT | Performed by: INTERNAL MEDICINE

## 2022-04-28 PROCEDURE — 1101F PT FALLS ASSESS-DOCD LE1/YR: CPT | Performed by: INTERNAL MEDICINE

## 2022-04-28 PROCEDURE — G0439 PPPS, SUBSEQ VISIT: HCPCS | Performed by: INTERNAL MEDICINE

## 2022-04-28 PROCEDURE — G0463 HOSPITAL OUTPT CLINIC VISIT: HCPCS | Performed by: INTERNAL MEDICINE

## 2022-04-28 PROCEDURE — G8510 SCR DEP NEG, NO PLAN REQD: HCPCS | Performed by: INTERNAL MEDICINE

## 2022-04-28 PROCEDURE — G8420 CALC BMI NORM PARAMETERS: HCPCS | Performed by: INTERNAL MEDICINE

## 2022-04-28 PROCEDURE — G8399 PT W/DXA RESULTS DOCUMENT: HCPCS | Performed by: INTERNAL MEDICINE

## 2022-04-28 PROCEDURE — G8427 DOCREV CUR MEDS BY ELIG CLIN: HCPCS | Performed by: INTERNAL MEDICINE

## 2022-04-28 PROCEDURE — 1090F PRES/ABSN URINE INCON ASSESS: CPT | Performed by: INTERNAL MEDICINE

## 2022-04-28 RX ORDER — FAMOTIDINE 20 MG/1
20 TABLET, FILM COATED ORAL
COMMUNITY

## 2022-04-28 NOTE — PATIENT INSTRUCTIONS
Medicare Wellness Visit, Female     The best way to live healthy is to have a lifestyle where you eat a well-balanced diet, exercise regularly, limit alcohol use, and quit all forms of tobacco/nicotine, if applicable. Regular preventive services are another way to keep healthy. Preventive services (vaccines, screening tests, monitoring & exams) can help personalize your care plan, which helps you manage your own care. Screening tests can find health problems at the earliest stages, when they are easiest to treat. [facility] follows the current, evidence-based guidelines published by the [facility] ([facility]) when recommending preventive services for our patients. Because we follow these guidelines, sometimes recommendations change over time as research supports it. (For example, mammograms used to be recommended annually. Even though Medicare will still pay for an annual mammogram, the newer guidelines recommend a mammogram every two years for women of average risk). Of course, you and your doctor may decide to screen more often for some diseases, based on your risk and your co-morbidities (chronic disease you are already diagnosed with). Preventive services for you include:  - Medicare offers their members a free annual wellness visit, which is time for you and your primary care provider to discuss and plan for your preventive service needs. Take advantage of this benefit every year!  -All adults over the age of 72 should receive the recommended pneumonia vaccines. Current USPSTF guidelines recommend a series of two vaccines for the best pneumonia protection.   -All adults should have a flu vaccine yearly and a tetanus vaccine every 10 years.   -All adults age 48 and older should receive the shingles vaccines (series of two vaccines).       -All adults age 38-68 who are overweight should have a diabetes screening test once every three years.   -All adults born between [birth year] and 1965 should be screened once for Hepatitis C.  -Other screening tests and preventive services for persons with diabetes include: an eye exam to screen for diabetic retinopathy, a kidney function test, a foot exam, and stricter control over your cholesterol.   -Cardiovascular screening for adults with routine risk involves an electrocardiogram (ECG) at intervals determined by your doctor.   -Colorectal cancer screenings should be done for adults age 54-65 with no increased risk factors for colorectal cancer. There are a number of acceptable methods of screening for this type of cancer. Each test has its own benefits and drawbacks. Discuss with your doctor what is most appropriate for you during your annual wellness visit. The different tests include: colonoscopy (considered the best screening method), a fecal occult blood test, a fecal DNA test, and sigmoidoscopy.    -A bone mass density test is recommended when a woman turns 65 to screen for osteoporosis. This test is only recommended one time, as a screening. Some providers will use this same test as a disease monitoring tool if you already have osteoporosis. -Breast cancer screenings are recommended every other year for women of normal risk, age 54-69.  -Cervical cancer screenings for women over age 72 are only recommended with certain risk factors. Here is a list of your current Health Maintenance items (your personalized list of preventive services) with a due date:  Health Maintenance Due   Topic Date Due    Shingles Vaccine (1 of 2) Never done    Cholesterol Test   02/10/2022                     Preventing falls (The Basics)Written by the doctors and editors at [facility]       Am I at risk of falling?  Your risk of falling increases as you grow older. Thats because getting older can make it harder to walk steadily and keep your balance. Also, the effects of falls are more serious in older people.    Overall, 3 to 4 out of every 10 people over the age of 72 fall each year. Up to 76 percent of people who fracture a hip never recover to the point they were before they had their fracture. If you have fallen in the past, you are at higher risk of falling again. Several things can increase your risk of a fall, including:  Illness   A change in the medicines you take   An unsafe or unfamiliar setting (for example, a room with rugs or furniture that might trip you, or an area you dont know well)    How can my doctor help me to avoid falling?  Your doctor can talk to you about the following things:  Past falls  It is important to tell your doctor about any times you have fallen or almost fallen. He or she can then suggest ways to prevent another fall. Your health conditions  Some health problems can put you at risk of falling. These include conditions that affect eyesight, hearing, muscle strength, or balance. The medicines you take  Certain medicines can increase the risk of falling. These include some medicines that are used for sleeping problems, anxiety, or depression. Adding new medicines, or changing doses of some medicines, can also affect your risk of falling. The more your doctor knows about your situation, the better he or she will be able to help you. For example, if you fell because you have a condition that causes pain, your doctor might suggest treatments to deal with the pain. Or if 1 of your medicines is making you dizzy and more likely to fall, your doctor might switch you to a different medicine. Is there anything I can do on my own?  Yes. To help keep from falling, you can:  Make your home safer  To avoid falling at home, get rid of things that might make you trip or slip. This might include furniture, electrical cords, clutter, and loose rugs. Keep your home well lit so that you can easily see where you are going. Avoid storing things in high places so you dont have to reach or climb.    Wear sturdy shoes that fit well  Wearing shoes with high heels or slippery soles, or shoes that are too loose can lead to falls. Walking around in bare feet, or only socks, can also increase your risk of falling. Take vitamin D pills  Taking vitamin D might lower the risk of falls in older people. This is because vitamin D helps make bones and muscles stronger. Your doctor can help you decide how much vitamin D to take. Stay active  Exercising on a regular basis can help lower your risk of falling. It is best to do a few different activities that help with both strength and balance. There are many kinds of exercise that can be safe for older people. These include walking, swimming, and Nicho Chi (a [employer] martial art that involves slow, gentle movements). Use a cane, walker, and other safety devices  If your doctor recommends that you use a cane or walker, be sure that its the right size and you know how to use it. There are other devices that might help you avoid falling, too. These include grab bars or a sturdy seat for the shower, and hand rails or treads for the stairs (to prevent slipping). If you worry that you could fall, there are also alarm buttons that let you call for help if you fall and cant get up. What should I do if I fall?  If you fall, see your doctor right away, even if you arent hurt. Your doctor can try to figure out what caused you to fall, and how likely you are to fall again. He or she will do an exam and talk to you about your health problems, medicines, and activities. Then he or she can suggest things you can do to avoid falling again. Many older people have a hard time recovering after a fall. Doing things to prevent falling can help you to protect your health and independence. Muscle Conditioning: Exercises  Your Care Instructions  Here are some examples of exercises for muscle conditioning. Start each exercise slowly.  Ease off the exercise if you start to have pain.  Your doctor or physical therapist will tell you when you can start these exercises and which ones will work best for you. How to do the exercises  Wall push-ups    1. Stand facing a wall, about 12 to 18 inches away. 2. Place your hands on the wall at shoulder height. 3. Slowly bend your elbows and bring your face toward the wall, moving your hips and shoulders forward together. 4. Push slowly back to the starting position. 5. Start with 5 repetitions and work up to 8 to 12. 6. Rest for a minute, and repeat the exercise. Note: When you can do this exercise against a wall comfortably (without your muscles feeling tired), you can try it against a counter. Start with 5 repetitions again and work up to 8 to 12. You can then slowly progress to the end of a couch or a sturdy chair, and finally to the floor. Knee extension    1. While sitting in a chair, straighten one leg and hold while you slowly count to 5. Be sure you do not lock your knee. 2. Repeat 8 to 12 times. 3. Rest for a minute, and repeat the exercise. 4. Do the same exercise with the other leg. Note: If this exercise becomes easy, you can add a light weight around your ankle or tie an elastic resistance band to a chair leg and one ankle. Side-lying leg lift    1. Lie on your side, with your legs extended. Keep your hips straight up and down during this exercise. Do not let your top hip rock toward the back. Support your head with your hand, and place the other hand on the floor near your waist.  2. Slowly raise your upper leg until it is about in line with your shoulder. Keep your toes pointed forward. 3. Slowly lower your leg to the starting position. 4. Repeat 8 to 12 times. 5. Rest for a minute, and repeat the exercise. 6. Turn to your other side and do the same exercise with your other leg. Note: If this exercise becomes easy, you can add a light weight around your ankle or tie an elastic resistance band to both ankles.   Shallow standing knee bends    1. Stand with your hands lightly resting on a counter or chair in front of you with your feet shoulder-width apart. 2. Slowly bend your knees so that you squat down just like you were going to sit in a chair. Make sure your knees do not go in front of your toes. 3. Lower yourself about 6 inches. Your heels should remain on the floor at all times. 4. Rise slowly to a standing position. 5. Repeat 8 to 12 times. 6. Rest for a minute, and repeat the exercise. Follow-up care is a key part of your treatment and safety. Be sure to make and go to all appointments, and call your doctor if you are having problems. It's also a good idea to know your test results and keep a list of the medicines you take.

## 2022-04-28 NOTE — ACP (ADVANCE CARE PLANNING)
End of life planning discussed with patient. Patient states that they do have an advance directive but does not want placed in her chart.

## 2022-04-28 NOTE — PROGRESS NOTES
This is the Subsequent Medicare Annual Wellness Exam, performed 12 months or more after the Initial AWV or the last Subsequent AWV    I have reviewed the patient's medical history in detail and updated the computerized patient record. Assessment/Plan   Education and counseling provided:  Are appropriate based on today's review and evaluation    1. Medicare annual wellness visit, subsequent       Depression Risk Factor Screening     3 most recent PHQ Screens 4/28/2022   Little interest or pleasure in doing things Not at all   Feeling down, depressed, irritable, or hopeless Not at all   Total Score PHQ 2 0       Alcohol & Drug Abuse Risk Screen    Do you average more than 1 drink per night or more than 7 drinks a week:  No    On any one occasion in the past three months have you have had more than 3 drinks containing alcohol:  No          Functional Ability and Level of Safety    Hearing: left hearing decreased but not bad enough she wants testing. Activities of Daily Living: The home contains: no safety equipment. Patient does total self care      Ambulation: with no difficulty     Fall Risk:  Fall Risk Assessment, last 12 mths 4/28/2022   Able to walk? Yes   Fall in past 12 months? 0   Do you feel unsteady? 0   Are you worried about falling 1   Is TUG test greater than 12 seconds? 0   Is the gait abnormal? 0   Number of falls in past 12 months -   Fall with injury?  -      Abuse Screen:  Patient is not abused       Cognitive Screening    Has your family/caregiver stated any concerns about your memory: no     Cognitive Screening: normal    Health Maintenance Due     Health Maintenance Due   Topic Date Due    Shingrix Vaccine Age 49> (1 of 2) Never done    Lipid Screen  02/10/2022       Patient Care Team   Patient Care Team:  Bina Jordan MD as PCP - [facility]  Yareli Brown MD as PCP - [address] [place] [address]  [facility] Provider  Vianey Gonzales MD (Inactive) as Physician (Ophthalmology)  Jovan Valenzuela MD as Physician (Obstetrics & Gynecology)  Cassie Woodard MD (Cardiology)    History     Patient Active Problem List   Diagnosis Code    Hypercholesterolemia E78.00    Hypothyroidism E03.9    Osteopenia M85.80    Venous malformation Q27.9    Advance directive discussed with patient Z71.89    Gastroesophageal reflux disease without esophagitis K21.9    Intracranial atherosclerosis I67.2    History of CVA (cerebrovascular accident) Z86.73     Past Medical History:   Diagnosis Date    Acute ischemic stroke ([facility] 10/14/2020    [facility] 2016    basal cell skin carcinoma removed.  Cerebral artery occlusion with cerebral infarction ([facility] 10/2020    Gastroesophageal reflux disease without esophagitis 8/18/2016    Herpes     right gluteal outbreaks    Osteoporosis     Snoring     Thyroid disease     hypothyroidism    Venous malformation 5/19/2015    Left foot and has had embolization procedure.  Vision decreased       Past Surgical History:   Procedure Laterality Date    HX DILATION AND CURETTAGE      HX OTHER SURGICAL  03/2015    emobolization of left foot for vascular malformation     Current Outpatient Medications   Medication Sig Dispense Refill    famotidine (Pepcid) 20 mg tablet Take 20 mg by mouth daily as needed for Heartburn.  rosuvastatin (CRESTOR) 10 mg tablet Take 1 Tab by mouth nightly. 90 Tablet 0    levothyroxine (SYNTHROID) 112 mcg tablet Take 1 Tablet by mouth Daily (before breakfast). 90 Tablet 3    aspirin 81 mg chewable tablet Take 1 Tab by mouth two (2) times a day. Indications: stroke prevention 60 Tab 11    CALCIUM CARBONATE/VITAMIN D3 (CALCIUM 600 + D PO) Take 1 Tab by mouth two (2) times a day.  multivitamins-minerals-lutein (CENTRUM SILVER) Tab Take  by mouth.  valacyclovir HCl (VALTREX PO) Take  by mouth as needed.        Allergies   Allergen Reactions    Indocin [Indomethacin Sodium] Shortness of Breath and Swelling     Ankles & legs    Boniva [Ibandronate] Other (comments)     indigestion    Fosamax [Alendronate] Other (comments)     Indigestion      Plavix [Clopidogrel] Diarrhea     severe       Family History   Problem Relation Age of Onset    Hypertension Mother     Cancer Father     Heart Disease Father         cad    Cancer Sister 72        breast    Breast Cancer Sister 59     Social History     Tobacco Use    Smoking status: Former Smoker     Quit date: 1960     Years since quittin.3    Smokeless tobacco: Never Used   Substance Use Topics    Alcohol use: No         Lakshmi Peterson MD     Beatris Guillermo is a 68 y.o. female who was also seen today for a follow-up visit. Assessment & Plan:   1. Medicare annual wellness visit, subsequent  Health maintenance reviewed and updated with patient today at visit. Reviewed vaccine recommendations  2. Postmenopausal  -     DEXA BONE DENSITY STUDY AXIAL; Future  3. Visit for screening mammogram  -     [facility] 3D EMMANUEL W MAMMO BI SCREENING INCL CAD; Future  4. Hypercholesterolemia  Continue current medication check lab work. -     LIPID PANEL; Future  -     METABOLIC PANEL, COMPREHENSIVE; Future  -     CBC W/O DIFF; Future  5. Acquired hypothyroidism  Continue current medication check lab work for goal.    -     T4, FREE; Future  -     TSH 3RD GENERATION; Future  6. History of CVA (cerebrovascular accident)  Continue with aspirin  7. Osteopenia, unspecified location  Calcium, vitamin D, exercise to maintain bone density  8. Gastroesophageal reflux disease without esophagitis  Pepcid as needed    9. Weakness of both lower extremities mild  Given handout with exercises for strengthening. Recommend consideration of physical therapy. She is not ready to do physical therapy but will consider it if she feels her leg weakness is getting worse. Follow-up 1 year Medicare wellness visit. Subjective:   Beatris Guillermo was also seen for follow-up of CVA, hypercholesterolemia, hypothyroid, GERD.   She reports has been feeling well. Colonoscopy; due 2025 due to polyps. Taking medication with no side effects. She is following a diet low in fat and cholesterol. Review of Systems   Constitutional: Negative for fever, malaise/fatigue and weight loss. HENT: Negative for congestion and sore throat. Eyes: Negative. Respiratory: Negative for cough and shortness of breath. Cardiovascular: Negative for chest pain and leg swelling. Gastrointestinal: Positive for heartburn (controls by avoiding foods that trigger and pepcid as needed. ). Negative for abdominal pain, blood in stool, constipation, diarrhea and nausea. Genitourinary: Negative for dysuria, frequency and urgency. Musculoskeletal: Positive for back pain (on and off low back) and joint pain (on and off hips and knees. ). Skin: Negative for rash. Neurological: Positive for dizziness (feels lightheaded when she gets hot and bending over and standing up. ). Negative for sensory change, focal weakness and headaches. Psychiatric/Behavioral: Negative for depression.    - per HPI    Physical Exam  Vitals and nursing note reviewed. Constitutional:       General: She is not in acute distress. Appearance: She is well-developed. HENT:      Head: Normocephalic and atraumatic. Right Ear: Tympanic membrane and ear canal normal.      Left Ear: Tympanic membrane and ear canal normal.      Nose: Nose normal.   Eyes:      Conjunctiva/sclera: Conjunctivae normal.      Pupils: Pupils are equal, round, and reactive to light. Neck:      Thyroid: No thyromegaly. Cardiovascular:      Rate and Rhythm: Normal rate and regular rhythm. Heart sounds: Normal heart sounds. No murmur heard. Pulmonary:      Effort: Pulmonary effort is normal.      Breath sounds: Normal breath sounds. Chest:      Comments: Breast exam: breasts appear normal, no suspicious masses, no skin or nipple changes or axillary nodes.    Abdominal:      General: Bowel sounds are normal.      Palpations: Abdomen is soft. There is no mass. Tenderness: There is no abdominal tenderness. Musculoskeletal:      Cervical back: Normal range of motion. Right lower leg: No edema. Left lower leg: No edema. Comments: Left foot varicose vein. Lymphadenopathy:      Cervical: No cervical adenopathy. Skin:     Findings: No rash. Neurological:      Cranial Nerves: No cranial nerve deficit. Sensory: No sensory deficit. Comments: Lower ext motor 5-/5 bilateral   Psychiatric:         Mood and Affect: Mood normal.       Visit Vitals  /71 (BP 1 Location: Left upper arm, BP Patient Position: Sitting, BP Cuff Size: Large adult)   Pulse 67   Temp 97.5 °F (36.4 °C) (Temporal)   Resp 18   Ht 5' 8\" (1.727 m)   Wt 131 lb 6.4 oz (59.6 kg)   SpO2 99%   BMI 19.98 kg/m²        Aspects of this note may have been generated using voice recognition software. Despite editing, there may be some syntax errors   I have discussed the diagnosis with the patient and the intended plan as seen in the above orders. The patient has received an after-visit summary and questions were answered concerning future plans. I have discussed any recommended medication side effects and warnings with the patient as well.   She has expressed understanding of the diagnosis and plan    Heath Patel MD

## 2022-05-06 ENCOUNTER — TELEPHONE (OUTPATIENT)
Dept: INTERNAL MEDICINE CLINIC | Age: 77
End: 2022-05-06

## 2022-05-06 DIAGNOSIS — E78.00 HYPERCHOLESTEROLEMIA: Primary | ICD-10-CM

## 2022-05-06 DIAGNOSIS — E03.9 ACQUIRED HYPOTHYROIDISM: ICD-10-CM

## 2022-05-06 RX ORDER — LEVOTHYROXINE SODIUM 100 UG/1
100 TABLET ORAL
Qty: 90 TABLET | Refills: 0 | Status: SHIPPED | OUTPATIENT
Start: 2022-05-06 | End: 2022-08-04

## 2022-05-27 ENCOUNTER — HOSPITAL ENCOUNTER (OUTPATIENT)
Dept: MAMMOGRAPHY | Age: 77
Discharge: HOME | End: 2022-05-27
Attending: NURSE PRACTITIONER
Payer: MEDICARE

## 2022-05-27 DIAGNOSIS — Z12.31 VISIT FOR SCREENING MAMMOGRAM: ICD-10-CM

## 2022-05-27 DIAGNOSIS — Z12.31 SCREENING MAMMOGRAM FOR HIGH-RISK PATIENT: ICD-10-CM

## 2022-05-27 PROCEDURE — 77063 BREAST TOMOSYNTHESIS BI: CPT

## 2022-06-07 ENCOUNTER — HOSPITAL ENCOUNTER (OUTPATIENT)
Dept: MAMMOGRAPHY | Age: 77
Discharge: HOME | End: 2022-06-07
Attending: INTERNAL MEDICINE
Payer: MEDICARE

## 2022-06-07 DIAGNOSIS — R92.8 ABNORMAL MAMMOGRAM: ICD-10-CM

## 2022-06-07 PROCEDURE — 77065 DX MAMMO INCL CAD UNI: CPT

## 2022-06-08 ENCOUNTER — TELEPHONE (OUTPATIENT)
Dept: INTERNAL MEDICINE CLINIC | Age: 77
End: 2022-06-08

## 2022-06-08 NOTE — TELEPHONE ENCOUNTER
Spoke with Alexandrea Kelley with breast imaging w/ [facility]. Patient scheduled for biopsy on 06/15/22, currently taking Aspirin 81 mg. Requesting to hold aspirin 81 mg x 5 days prior to biopsy. Can give verbal order.

## 2022-06-08 NOTE — TELEPHONE ENCOUNTER
Left detailed message per MD gave VO to stop aspirin x 5 days prior to procedure. Left message for return call.

## 2022-06-08 NOTE — TELEPHONE ENCOUNTER
Okay to give verbal order to hold aspirin for 5 days for her procedure. Also do they need an order placed for the procedure.

## 2022-06-15 ENCOUNTER — HOSPITAL ENCOUNTER (OUTPATIENT)
Dept: MAMMOGRAPHY | Age: 77
Discharge: HOME | End: 2022-06-15
Attending: NURSE PRACTITIONER
Payer: MEDICARE

## 2022-06-15 DIAGNOSIS — R92.8 ABNORMAL MAMMOGRAM: ICD-10-CM

## 2022-06-15 PROCEDURE — 77065 DX MAMMO INCL CAD UNI: CPT

## 2022-06-15 PROCEDURE — 74011000250 HC RX REV CODE- 250: Performed by: RADIOLOGY

## 2022-06-15 PROCEDURE — 19081 BX BREAST 1ST LESION STRTCTC: CPT

## 2022-06-15 PROCEDURE — 88305 TISSUE EXAM BY PATHOLOGIST: CPT

## 2022-06-15 RX ORDER — LIDOCAINE HYDROCHLORIDE 10 MG/ML
5 INJECTION INFILTRATION; PERINEURAL
Status: COMPLETED | OUTPATIENT
Start: 2022-06-15 | End: 2022-06-15

## 2022-06-15 RX ORDER — LIDOCAINE HYDROCHLORIDE AND EPINEPHRINE 10; 10 MG/ML; UG/ML
20 INJECTION, SOLUTION INFILTRATION; PERINEURAL ONCE
Status: COMPLETED | OUTPATIENT
Start: 2022-06-15 | End: 2022-06-15

## 2022-06-15 RX ADMIN — LIDOCAINE HYDROCHLORIDE,EPINEPHRINE BITARTRATE 200 MG: 10; .01 INJECTION, SOLUTION INFILTRATION; PERINEURAL at 11:00

## 2022-06-15 RX ADMIN — LIDOCAINE HYDROCHLORIDE 5 ML: 10 INJECTION, SOLUTION INFILTRATION; PERINEURAL at 11:00

## 2022-06-20 NOTE — PROGRESS NOTES
Pathology approved by Dr. Maranda Burrows. Called patient and relayed benign results. Advised patient that Dr. Maranda Burrows recommends that she have a 6 month follow up mammogram because pathology found no calcification in the sample. She reports that her biopsy site is healing well with no concerns. Encouraged her to call with any question or concerns.

## 2022-06-23 DIAGNOSIS — R92.8 ABNORMAL MAMMOGRAM OF LEFT BREAST: Primary | ICD-10-CM

## 2022-06-24 ENCOUNTER — TRANSCRIBE ORDER (OUTPATIENT)
Dept: SCHEDULING | Age: 77
End: 2022-06-24

## 2022-06-24 DIAGNOSIS — R92.8 ABNORMAL MAMMOGRAM: Primary | ICD-10-CM

## 2022-06-28 NOTE — PROGRESS NOTES
No abnormality seen on breast biopsy but recommended to have a repeat mammogram in 6 months as no microcalcifications seen by pathology. See radiology note patient is aware.

## 2022-08-03 DIAGNOSIS — E03.9 ACQUIRED HYPOTHYROIDISM: ICD-10-CM

## 2022-08-04 RX ORDER — LEVOTHYROXINE SODIUM 100 UG/1
100 TABLET ORAL
Qty: 90 TABLET | Refills: 0 | Status: SHIPPED | OUTPATIENT
Start: 2022-08-04 | End: 2022-08-05 | Stop reason: SDUPTHER

## 2022-08-05 DIAGNOSIS — E03.9 ACQUIRED HYPOTHYROIDISM: ICD-10-CM

## 2022-08-05 RX ORDER — LEVOTHYROXINE SODIUM 100 UG/1
100 TABLET ORAL
Qty: 90 TABLET | Refills: 3 | Status: SHIPPED | OUTPATIENT
Start: 2022-08-05 | End: 2022-09-01 | Stop reason: DRUGHIGH

## 2022-09-01 ENCOUNTER — OFFICE VISIT (OUTPATIENT)
Dept: INTERNAL MEDICINE CLINIC | Age: 77
End: 2022-09-01
Payer: MEDICARE

## 2022-09-01 VITALS
WEIGHT: 132.2 LBS | BODY MASS INDEX: 20.03 KG/M2 | HEIGHT: 68 IN | DIASTOLIC BLOOD PRESSURE: 74 MMHG | OXYGEN SATURATION: 100 % | HEART RATE: 70 BPM | SYSTOLIC BLOOD PRESSURE: 122 MMHG | TEMPERATURE: 97.5 F | RESPIRATION RATE: 16 BRPM

## 2022-09-01 DIAGNOSIS — E03.9 ACQUIRED HYPOTHYROIDISM: ICD-10-CM

## 2022-09-01 DIAGNOSIS — R00.2 PALPITATIONS: Primary | ICD-10-CM

## 2022-09-01 DIAGNOSIS — R42 DIZZINESS: ICD-10-CM

## 2022-09-01 PROCEDURE — G0463 HOSPITAL OUTPT CLINIC VISIT: HCPCS | Performed by: INTERNAL MEDICINE

## 2022-09-01 PROCEDURE — 1101F PT FALLS ASSESS-DOCD LE1/YR: CPT | Performed by: INTERNAL MEDICINE

## 2022-09-01 PROCEDURE — 1090F PRES/ABSN URINE INCON ASSESS: CPT | Performed by: INTERNAL MEDICINE

## 2022-09-01 PROCEDURE — G8427 DOCREV CUR MEDS BY ELIG CLIN: HCPCS | Performed by: INTERNAL MEDICINE

## 2022-09-01 PROCEDURE — 93005 ELECTROCARDIOGRAM TRACING: CPT | Performed by: INTERNAL MEDICINE

## 2022-09-01 PROCEDURE — G8536 NO DOC ELDER MAL SCRN: HCPCS | Performed by: INTERNAL MEDICINE

## 2022-09-01 PROCEDURE — G8510 SCR DEP NEG, NO PLAN REQD: HCPCS | Performed by: INTERNAL MEDICINE

## 2022-09-01 PROCEDURE — G8420 CALC BMI NORM PARAMETERS: HCPCS | Performed by: INTERNAL MEDICINE

## 2022-09-01 PROCEDURE — 99214 OFFICE O/P EST MOD 30 MIN: CPT | Performed by: INTERNAL MEDICINE

## 2022-09-01 PROCEDURE — G8399 PT W/DXA RESULTS DOCUMENT: HCPCS | Performed by: INTERNAL MEDICINE

## 2022-09-01 PROCEDURE — 93010 ELECTROCARDIOGRAM REPORT: CPT | Performed by: INTERNAL MEDICINE

## 2022-09-01 RX ORDER — LEVOTHYROXINE SODIUM 88 UG/1
88 TABLET ORAL
Qty: 90 TABLET | Refills: 0 | Status: SHIPPED | OUTPATIENT
Start: 2022-09-01

## 2022-09-01 NOTE — PROGRESS NOTES
Yassine Parra is a 68 y.o. female who was seen today for an acute visit. Assessment & Plan:   1. Palpitations  Symptoms sound like an arrhythmia and she is referred to her cardiologist as she will need event monitor and further evaluation. Will rule out metabolic abnormality and decrease her levothyroxine as her last level was at goal but lower end on her TSH and upper end on her T4 and could still be receiving higher dose that she needs. -     AMB POC EKG ROUTINE W/ 12 LEADS, INTER & REP  -     METABOLIC PANEL, BASIC; Future  -     CBC WITH AUTOMATED DIFF; Future  -     TSH 3RD GENERATION; Future  -     T4, FREE; Future  -     REFERRAL TO CARDIOLOGY  2. Dizziness  Management as above  -     AMB POC EKG ROUTINE W/ 12 LEADS, INTER & REP  -     REFERRAL TO CARDIOLOGY  3. Acquired hypothyroidism  May be over replaced and will decrease to levothyroxine 88 mcg.   -     TSH 3RD GENERATION; Future  -     T4, FREE; Future    Follow-up and Dispositions    Return if symptoms worsen or fail to improve. Subjective:   Yassine Parra was seen for Dizziness  Yesterday onset of lightheadedness and feeling heart pounding for about 3 minutes. No near syncope or syncope and no CP SOB. She had 1 other episode in June that was similar except that it started when she had been outside in the heat. She has a history of a syncopal episode back in 2019 and evaluation was unremarkable by her cardiologist Dr. Dari Willoughby. Review of Systems   Respiratory:  Negative for shortness of breath. Cardiovascular:  Negative for chest pain and leg swelling. Gastrointestinal:  Negative for abdominal pain. - per HPI    Physical Exam  Vitals and nursing note reviewed. Constitutional:       General: She is not in acute distress. Appearance: She is well-developed. HENT:      Head: Normocephalic and atraumatic. Neck:      Thyroid: No thyromegaly. Cardiovascular:      Rate and Rhythm: Normal rate and regular rhythm. Pulses: Normal pulses. Heart sounds: Normal heart sounds. No murmur heard. Pulmonary:      Effort: Pulmonary effort is normal.      Breath sounds: Normal breath sounds. No wheezing. Abdominal:      General: Bowel sounds are normal. There is no distension. Palpations: Abdomen is soft. There is no mass. Tenderness: There is no abdominal tenderness. Musculoskeletal:      Right lower leg: No edema. Left lower leg: No edema. Psychiatric:         Mood and Affect: Mood normal.     Visit Vitals  /74 (BP 1 Location: Left upper arm, BP Patient Position: Sitting, BP Cuff Size: Large adult)   Pulse 70   Temp 97.5 °F (36.4 °C) (Temporal)   Resp 16   Ht 5' 8\" (1.727 m)   Wt 132 lb 3.2 oz (60 kg)   SpO2 100%   BMI 20.10 kg/m²       Aspects of this note may have been generated using voice recognition software. Despite editing, there may be some syntax errors   I have discussed the diagnosis with the patient and the intended plan as seen in the above orders. The patient has received an after-visit summary and questions were answered concerning future plans. I have discussed any recommended medication side effects and warnings with the patient as well.   She has expressed understanding of the diagnosis and plan    Chantell Lawson MD

## 2022-09-02 ENCOUNTER — OFFICE VISIT (OUTPATIENT)
Dept: CARDIOLOGY CLINIC | Age: 77
End: 2022-09-02
Payer: MEDICARE

## 2022-09-02 ENCOUNTER — TELEPHONE (OUTPATIENT)
Dept: CARDIOLOGY CLINIC | Age: 77
End: 2022-09-02

## 2022-09-02 VITALS
RESPIRATION RATE: 16 BRPM | HEART RATE: 81 BPM | OXYGEN SATURATION: 97 % | WEIGHT: 132 LBS | BODY MASS INDEX: 20 KG/M2 | DIASTOLIC BLOOD PRESSURE: 78 MMHG | SYSTOLIC BLOOD PRESSURE: 138 MMHG | HEIGHT: 68 IN

## 2022-09-02 DIAGNOSIS — R55 NEAR SYNCOPE: ICD-10-CM

## 2022-09-02 DIAGNOSIS — R00.2 PALPITATIONS: Primary | ICD-10-CM

## 2022-09-02 DIAGNOSIS — E78.00 HYPERCHOLESTEROLEMIA: ICD-10-CM

## 2022-09-02 DIAGNOSIS — R42 DIZZY: ICD-10-CM

## 2022-09-02 PROCEDURE — G8510 SCR DEP NEG, NO PLAN REQD: HCPCS | Performed by: SPECIALIST

## 2022-09-02 PROCEDURE — 1123F ACP DISCUSS/DSCN MKR DOCD: CPT | Performed by: SPECIALIST

## 2022-09-02 PROCEDURE — 1090F PRES/ABSN URINE INCON ASSESS: CPT | Performed by: SPECIALIST

## 2022-09-02 PROCEDURE — 99214 OFFICE O/P EST MOD 30 MIN: CPT | Performed by: SPECIALIST

## 2022-09-02 PROCEDURE — G8536 NO DOC ELDER MAL SCRN: HCPCS | Performed by: SPECIALIST

## 2022-09-02 PROCEDURE — G8427 DOCREV CUR MEDS BY ELIG CLIN: HCPCS | Performed by: SPECIALIST

## 2022-09-02 PROCEDURE — 1101F PT FALLS ASSESS-DOCD LE1/YR: CPT | Performed by: SPECIALIST

## 2022-09-02 PROCEDURE — G8420 CALC BMI NORM PARAMETERS: HCPCS | Performed by: SPECIALIST

## 2022-09-02 PROCEDURE — G8399 PT W/DXA RESULTS DOCUMENT: HCPCS | Performed by: SPECIALIST

## 2022-09-02 NOTE — PROGRESS NOTES
HISTORY OF PRESENT ILLNESS  Delilah Bright is a 68 y.o. female     SUMMARY:   Problem List  Date Reviewed: 9/2/2022            Codes Class Noted    History of CVA (cerebrovascular accident) ICD-10-CM: Z86.73  ICD-9-CM: V12.54  12/24/2020        Intracranial atherosclerosis ICD-10-CM: E85.8  ICD-9-CM: 437.0  11/2/2020        Advance directive discussed with patient ICD-10-CM: Z71.89  ICD-9-CM: V65.49  8/18/2016    Overview Signed 8/18/2016  9:25 AM by Haritha Louie MD     End of life planning discussed with patient. Patient states that they do not have an advance care plan at this time. Information has been provided in today's              Gastroesophageal reflux disease without esophagitis ICD-10-CM: K21.9  ICD-9-CM: 530.81  8/18/2016        Hypercholesterolemia ICD-10-CM: E78.00  ICD-9-CM: 272.0  2/12/2010        Hypothyroidism ICD-10-CM: E03.9  ICD-9-CM: 244.9  2/12/2010        Osteopenia ICD-10-CM: M85.80  ICD-9-CM: 733.90  2/12/2010    Overview Signed 2/18/2011  4:45 PM by Haritha Louie MD     Last reclast 4/10, recent bone density 1/11 osteopenia mild, hold reclast                Current Outpatient Medications on File Prior to Visit   Medication Sig    levothyroxine (SYNTHROID) 88 mcg tablet Take 1 Tablet by mouth Daily (before breakfast). rosuvastatin (CRESTOR) 10 mg tablet Take 1 Tab by mouth nightly. famotidine (PEPCID) 20 mg tablet Take 20 mg by mouth daily as needed for Heartburn. aspirin 81 mg chewable tablet Take 1 Tab by mouth two (2) times a day. Indications: stroke prevention    CALCIUM CARBONATE/VITAMIN D3 (CALCIUM 600 + D PO) Take 1 Tab by mouth two (2) times a day. multivitamins-minerals-lutein (MEN'S CENTRUM SILVER WITH LUTEIN) tab tablet Take  by mouth. No current facility-administered medications on file prior to visit.        CARDIOLOGY STUDIES TO DATE:  3/19 holter, rare pvcs and pacs, occasional short runs atrial tachycardia    Chief Complaint   Patient presents with    Follow-up     HPI :  She is referred back from her primary care for cardiac evaluation. When we met a couple years ago she had symptoms of orthostatic hypotension and she still has that problem and is become even more sensitive to heat. She will also feel lightheaded if she stands in 1 position for too long. Fortunately she gets a warning and she is able to sit down or lie down and has not actually fainted. Over the last couple of months she has had several episodes where her heart will beat forcefully and rapidly for a minute or 2 and when this happens she also feels faint. She drinks minimal Amounts of caffeine and lots of water. She wear support hose. CARDIAC ROS:   negative for chest pain, dyspnea, orthopnea, paroxysmal nocturnal dyspnea, exertional chest pressure/discomfort, claudication    Family History   Problem Relation Age of Onset    Hypertension Mother     Cancer Father     Heart Disease Father         cad    Cancer Sister 72        breast    Breast Cancer Sister 59       Past Medical History:   Diagnosis Date    Acute ischemic stroke ([facility] 10/14/2020    Cancer ([facility] 2016    basal cell skin carcinoma removed. Cerebral artery occlusion with cerebral infarction ([facility] 10/2020    Gastroesophageal reflux disease without esophagitis 8/18/2016    Herpes     right gluteal outbreaks    Osteoporosis     Snoring     Thyroid disease     hypothyroidism    Venous malformation 5/19/2015    Left foot and has had embolization procedure. Venous malformation 5/19/2015    Left foot and has had embolization procedure. Vision decreased        GENERAL ROS:  A comprehensive review of systems was negative except for that written in the HPI.     Visit Vitals  /78 (BP 1 Location: Right arm, BP Patient Position: Sitting, BP Cuff Size: Adult)   Pulse 81   Resp 16   Ht 5' 8\" (1.727 m)   Wt 132 lb (59.9 kg)   SpO2 97%   BMI 20.07 kg/m²       Wt Readings from Last 3 Encounters:   09/02/22 132 lb (59.9 kg) 09/01/22 132 lb 3.2 oz (60 kg)   04/28/22 131 lb 6.4 oz (59.6 kg)            BP Readings from Last 3 Encounters:   09/02/22 138/78   09/01/22 122/74   04/28/22 129/71       PHYSICAL EXAM  General appearance: alert, cooperative, no distress, appears stated age  Neurologic: Alert and oriented X 3  Neck: supple, symmetrical, trachea midline, no adenopathy, no carotid bruit, and no JVD  Lungs: clear to auscultation bilaterally  Heart: regular rate and rhythm, S1, S2 normal, no murmur, click, rub or gallop  Extremities: extremities normal, atraumatic, no cyanosis or edema    Lab Results   Component Value Date/Time    Cholesterol, total 138 05/05/2022 07:55 AM    Cholesterol, total 146 02/10/2021 08:45 AM    Cholesterol, total 103 12/24/2020 09:21 AM    Cholesterol, total 173 10/15/2020 01:09 AM    Cholesterol, total 218 (H) 10/13/2020 01:15 PM    HDL Cholesterol 73 05/05/2022 07:55 AM    HDL Cholesterol 76 02/10/2021 08:45 AM    HDL Cholesterol 48 12/24/2020 09:21 AM    HDL Cholesterol 59 10/15/2020 01:09 AM    HDL Cholesterol 58 10/13/2020 01:15 PM    LDL, calculated 46.6 05/05/2022 07:55 AM    LDL, calculated 51.2 02/10/2021 08:45 AM    LDL, calculated 36.6 12/24/2020 09:21 AM    LDL, calculated 103 (H) 10/15/2020 01:09 AM    LDL, calculated 136 (H) 10/13/2020 01:15 PM    LDL, calculated 138 (H) 11/14/2019 12:17 PM    Triglyceride 92 05/05/2022 07:55 AM    Triglyceride 94 02/10/2021 08:45 AM    Triglyceride 92 12/24/2020 09:21 AM    Triglyceride 55 10/15/2020 01:09 AM    Triglyceride 134 10/13/2020 01:15 PM    CHOL/HDL Ratio 1.9 05/05/2022 07:55 AM    CHOL/HDL Ratio 1.9 02/10/2021 08:45 AM    CHOL/HDL Ratio 2.1 12/24/2020 09:21 AM    CHOL/HDL Ratio 2.9 10/15/2020 01:09 AM    CHOL/HDL Ratio 4.2 10/07/2010 09:16 AM     ASSESSMENT :      She had palpitations when we first met her Holter was negative but this sounds like it is a little different.   Working to start with a 30-day event monitor to see if there is anything there that needs further evaluation or treatment. current treatment plan is effective, no change in therapy  lab results and schedule of future lab studies reviewed with patient  reviewed diet, exercise and weight control    Encounter Diagnoses   Name Primary? Palpitations Yes    Hypercholesterolemia     Dizzy      No orders of the defined types were placed in this encounter. Follow-up and Dispositions    Return if symptoms worsen or fail to improve. Shaun Silva MD  9/2/2022  Please note that this dictation was completed with [employer], the computer voice recognition software. Quite often unanticipated grammatical, syntax, homophones, and other interpretive errors are inadvertently transcribed by the computer software. Please disregard these errors. Please excuse any errors that have escaped final proofreading. Thank you.

## 2022-09-02 NOTE — TELEPHONE ENCOUNTER
Enrolled with [employer] - Ordered and being shipped to patient's home address on file. ETA within 5-7 business days. safemail  Received: Today  Nancy Reed RN  Leita Smoker  Dr. Conner Castaneda would like to have a 30 day event monitor mailed to patient dx palpitations, dizziness, near syncope     Thanks!    Rodrigo Yun

## 2022-09-21 ENCOUNTER — TELEPHONE (OUTPATIENT)
Dept: CARDIOLOGY CLINIC | Age: 77
End: 2022-09-21

## 2022-09-21 DIAGNOSIS — I47.1 SVT (SUPRAVENTRICULAR TACHYCARDIA) (HCC): Primary | ICD-10-CM

## 2022-09-21 NOTE — TELEPHONE ENCOUNTER
Patient is currently wearing a 30 day monitor. States that she is getting an error message that reads \"poor skin contact. \" States that she has adjusted the monitor several times and is not sure what's happening. Would like to for someone to take a look at the monitor if possible. Can be reached at [phone].     Thanks,  Cuba Bashir'

## 2022-09-29 ENCOUNTER — DOCUMENTATION ONLY (OUTPATIENT)
Dept: CARDIOLOGY CLINIC | Age: 77
End: 2022-09-29

## 2022-09-30 ENCOUNTER — TELEPHONE (OUTPATIENT)
Dept: CARDIOLOGY CLINIC | Age: 77
End: 2022-09-30

## 2022-09-30 DIAGNOSIS — I47.1 SVT (SUPRAVENTRICULAR TACHYCARDIA) (HCC): ICD-10-CM

## 2022-09-30 RX ORDER — DIGOXIN 125 MCG
0.12 TABLET ORAL DAILY
Qty: 30 TABLET | Refills: 2 | Status: SHIPPED | OUTPATIENT
Start: 2022-09-30 | End: 2022-10-27

## 2022-09-30 RX ORDER — DIGOXIN 125 MCG
0.12 TABLET ORAL DAILY
Qty: 30 TABLET | Refills: 2 | Status: SHIPPED | OUTPATIENT
Start: 2022-09-30 | End: 2022-09-30 | Stop reason: SDUPTHER

## 2022-09-30 NOTE — PROGRESS NOTES
Let her know she had episode of rapid heart beat, did she feel it? Start digoxin 0.125mg per day.  She has low bp issues, reassure her that this medication wont affect bp

## 2022-09-30 NOTE — TELEPHONE ENCOUNTER
See \"documentation only\" notes in CC. Patient having SVT on loops. Per Dr. Rachael Morris VO, add dig 0.125 mg daily, explain should not lower BP. Called pt. Verified patient's identity with two identifiers. Notified her of report and Dr. Rachael Morris recommendation/message. Patient stated she only had sxs during this episode for about 30 seconds, but previous episodes lasted much longer. Explained digoxin and Dr. Rachael Morris message about not affecting BP. Patient agreed to try med. Also advised she continue wearing monitor so we can see what HR does, etc. Patient agreed to this as well. Patient verbalized understanding and denied further questions or concerns. Requested Prescriptions     Signed Prescriptions Disp Refills    digoxin (LANOXIN) 0.125 mg tablet 30 Tablet 2     Sig: Take 1 Tablet by mouth daily.      Authorizing Provider: Esteban Jaimes     Ordering User: Sawyer Kim    Per Dr. Rachael Morris verbal orders

## 2022-10-04 NOTE — TELEPHONE ENCOUNTER
MD Susi Dillon, RN  Caller: Unspecified (4 days ago, 11:40 AM)  Should probably set up appt with rosario or chacon in case meds ineffective or not tolerated

## 2022-10-04 NOTE — TELEPHONE ENCOUNTER
Called pt. Notified her Dr. Latricia Casillas advised we schedule tentative appt for her to see one of the EP doctors and reasoning why. Patient agreed. Scheduled appointment for 11/4 with Dr. Linda Morris. Patient verbalized understanding and denied further questions or concerns. Future Appointments   Date Time Provider Glen Linsey   11/4/2022  1:45 PM Tricia Wallace MD [facility]   12/23/2022 12:30 PM [address] [facility]   12/23/2022  1:00 PM [facility] 2 [address]  [facility]   4/4/2023 10:00 AM Millie Michaels MD [facility]

## 2022-10-07 NOTE — TELEPHONE ENCOUNTER
Called pt. Verified patient's identity with two identifiers. Asked if she called [employer] yet to make sure they are getting readings. She said she did at first and they said they are. I let her now they would probably call her or notify us if they were not getting anything and several patients have said the monitor shows poor connection, but usually still get readings. Pt will continue wearing monitor and said her last day to wear it is Wednesday. Patient verbalized understanding and denied further questions or concerns. I did review pt's reports on [employer] site and there have been recordings the past few days.

## 2022-10-25 ENCOUNTER — TELEPHONE (OUTPATIENT)
Dept: INTERNAL MEDICINE CLINIC | Age: 77
End: 2022-10-25

## 2022-10-25 NOTE — TELEPHONE ENCOUNTER
Reason for call:  Pt states she is dealing with a severe head cold and would like a recommendation for an over the counter cold medication that will not interact with her current prescription meds    Is this a new problem: yes     Date of last appointment:  9/1/2022     Can we respond via Virtual Web: no    Best call back number:   Alla Leung (Self) [phone] (Home)

## 2022-10-26 ENCOUNTER — TELEPHONE (OUTPATIENT)
Dept: CARDIOLOGY CLINIC | Age: 77
End: 2022-10-26

## 2022-10-26 NOTE — TELEPHONE ENCOUNTER
Dr. Holley Pascal-  Patient's end of service event monitor results are on your desk for review. Future Appointments   Date Time Provider Glen Stiles   11/4/2022  1:45 PM Tricia Wallace MD [facility]   12/23/2022 12:30 PM [facility]   12/23/2022  1:00 PM [facility] 2 [address]  [facility]   4/4/2023 10:00 AM Viviana Michaels MD [facility]

## 2022-10-26 NOTE — TELEPHONE ENCOUNTER
Called pt. Verified patient's identity with two identifiers. Notified her of Dr. Davis Showshaun message. She has had a head cold that occurred a few days after covid booster. Doctor gave her antibiotic to take only if progresses to infection and she needs, but she is not taking it. She is not taking anything because the only OTC med pharmacist advised okay to take with digoxin is one she read she shouldn't take with dig when she looked online. She has been feeling awful for 7 days from this so she is not sure if any of the heart symptoms. She is planning on still seeing Dr. Pelon Loredo next week. She will call back if anything changes or any sxs prior to that. Patient verbalized understanding and denied further questions or concerns.

## 2022-10-26 NOTE — TELEPHONE ENCOUNTER
MD Lucas Dunbar, RN  Caller: Unspecified (Today,  2:48 PM)  2 episodes of elevated heart rate, from report sounds like she didn't feel it. We added digoxin after first episode detected. See how she is doing.  thanks

## 2022-10-27 NOTE — TELEPHONE ENCOUNTER
Patient report think she will be ok. Patient seen at urgent care. Experienced head cold, cough. Started two days after COVID booster. Prescribed antibiotic, has not started. She will continue to monitor and call office if sx worsen.

## 2022-11-03 PROBLEM — I47.1 SVT (SUPRAVENTRICULAR TACHYCARDIA) (HCC): Status: ACTIVE | Noted: 2022-11-03

## 2022-11-03 PROBLEM — I47.10 SVT (SUPRAVENTRICULAR TACHYCARDIA): Status: ACTIVE | Noted: 2022-11-03

## 2022-11-03 NOTE — PROGRESS NOTES
Cardiac Electrophysiology OFFICE Consultation Note       Assessment/Plan:   1. SVT (supraventricular tachycardia) (HCC)  -     AMB POC EKG ROUTINE W/ 12 LEADS, INTER & REP  2. Vasovagal syncope  3. History of CVA (cerebrovascular accident)  4. Hypercholesterolemia  5. Acquired hypothyroidism     SVT  History of symptomatic palpitations with associated dizziness and near syncope. Recent event monitor demonstrated clear evidence of sustained narrow complex tachycardia with rate of 222 bpm.  She also had another asymptomatic tachycardic episode about 160 bpm.  Differential diagnosis includes AVNRT versus atrial tachycardia. Given history of palpitation and near syncope, she would benefit from EP study possible SVT ablation  - I have discussed in detail the indications, alternatives, benefits and risks of an electrophysiologic study and radiofrequency ablation for SVT. The risks that were discussed included but not limited to bleeding, pericarditis, damage to the electrical conduction system potentially requiring a pacemaker, cardiac tamponade, ventricular arrhythmias, stroke, MI, and death. Patient reports complete understanding of discussions and recommendations and wish to proceed with the procedure. - Schedule for EP study possible SVT ablation next available    Vasovagal syncope  History of vasovagal syncope. Unclear if this has been symptomatic SVT alone. However she does have symptoms predominantly during the summer months. - We will trial midodrine 2.5 mg 3 times daily  - Educated the patient on the mechanism of vasovagal syncope, recognizing prodrome symptoms, and the situations that could predispose to vasovagal syncope. Recommended aggressive hydration, especially during exercise, illness, or in hot environment. Recommended assuming the supine position in the setting of onset of prodrome symptoms. Consider TEDs stocking to improve venous return.     History of CVA  Continue aspirin 81 mg and Crestor 10 mg daily    Hypothyroidism  Continue levothyroxine 88 mcg daily    Subjective:       Angela Cheney is a 68 y.o. patient who is seen for evaluation of SVT and history of syncope. Remote history of syncope during the summer months. She's noticing increased frequency of palpitations. Sensation of palpitations is associated with near syncope. Recent event monitor demonstrated SVT with HR at 220 bpm correlating with symptoms of palpitations. Otherwise denies of any chest pain. .    Patient Active Problem List   Diagnosis Code    Hypercholesterolemia E78.00    Hypothyroidism E03.9    Osteopenia M85.80    Advance directive discussed with patient Z71.89    Gastroesophageal reflux disease without esophagitis K21.9    Intracranial atherosclerosis I67.2    History of CVA (cerebrovascular accident) Z86.73    SVT (supraventricular tachycardia) ([facility]) I47.1    Vasovagal syncope R55     Current Outpatient Medications   Medication Sig Dispense Refill    midodrine (PROAMATINE) 2.5 mg tablet Take 1 Tablet by mouth three (3) times daily. 90 Tablet 5    levothyroxine (SYNTHROID) 88 mcg tablet Take 1 Tablet by mouth Daily (before breakfast). 90 Tablet 0    rosuvastatin (CRESTOR) 10 mg tablet Take 1 Tab by mouth nightly. 90 Tablet 2    famotidine (PEPCID) 20 mg tablet Take 20 mg by mouth daily as needed for Heartburn. aspirin 81 mg chewable tablet Take 1 Tab by mouth two (2) times a day. Indications: stroke prevention 60 Tab 11    CALCIUM CARBONATE/VITAMIN D3 (CALCIUM 600 + D PO) Take 1 Tab by mouth two (2) times a day. multivitamins-minerals-lutein (MEN'S CENTRUM SILVER WITH LUTEIN) tab tablet Take  by mouth.        Allergies   Allergen Reactions    Indocin [Indomethacin Sodium] Shortness of Breath and Swelling     Ankles & legs    Boniva [Ibandronate] Other (comments)     indigestion    Fosamax [Alendronate] Other (comments)     Indigestion      Plavix [Clopidogrel] Diarrhea     severe     Past Medical History: Diagnosis Date    Acute ischemic stroke ([facility] 10/14/2020    Cancer ([facility] 2016    basal cell skin carcinoma removed. Cerebral artery occlusion with cerebral infarction ([facility] 10/2020    Gastroesophageal reflux disease without esophagitis 2016    Herpes     right gluteal outbreaks    Osteoporosis     Snoring     Thyroid disease     hypothyroidism    Venous malformation 2015    Left foot and has had embolization procedure. Venous malformation 2015    Left foot and has had embolization procedure. 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  All negative except for HPI     Objective:   /80   Pulse 71   Resp 16   Ht 5' 8\" (1.727 m)   Wt 128 lb 9.6 oz (58.3 kg)   SpO2 100%   BMI 19.55 kg/m²     Physical Exam:   General:  Alert and oriented, in no acute distress  Head:  Atraumatic, normocephalic  Eyes:  extraocular muscles intact  Neck:  Supple, normal range of motion  Lungs:  Clear to auscultation bilaterally, no wheezes/rales/rhonchi   Cardiovascular:  Regular rate and rhythm, normal S1-S2, no murmurs/rubs/gallops  Abdomen:  Soft, nontender, nondistended, normoactive bowel sounds  Skin:  Intact, no rash  Extremities:, no clubbing, cyanosis, or edema  Musculoskeletal: normal range of motion  Neurological:  Alert and oriented, no focal neurologic deficits  Psychiatric:  Normal mood and affect    Lab Results   Component Value Date/Time    Hemoglobin A1c 5.9 (H) 10/15/2020 01:09 AM     EKG: Sinus rhythm at 71 bpm, no evidence of ischemic changes. 10/14/20    ECHO ADULT COMPLETE 10/15/2020 10/15/2020    Interpretation Summary  · Saline contrast was given to evaluate for intracardiac shunt. · LV: Estimated LVEF is 60 - 65%. Normal cavity size, wall thickness and systolic function (ejection fraction normal). Wall motion: normal.    Signed by: Marshal Morgan MD on 10/15/2020  7:36 PM        Results for orders placed or performed during the hospital encounter of 01/01/21   EKG, 12 LEAD, INITIAL   Result Value Ref Range    Ventricular Rate 61 BPM    Atrial Rate 61 BPM    P-R Interval 152 ms    QRS Duration 78 ms    Q-T Interval 398 ms    QTC Calculation (Bezet) 400 ms    Calculated P Axis 79 degrees    Calculated R Axis 86 degrees    Calculated T Axis 81 degrees    Diagnosis       Normal sinus rhythm  Right atrial enlargement  When compared with ECG of 14-OCT-2020 17:11,  No significant change was found  Confirmed by Guillermo Collet ([id number]) on 1/3/2021 2:21:24 PM               Thank you for involving me in this patient's care and please call with further concerns or questions. ________________________________________  An Corby Chambers MD, [facility]  Cardiac Electrophysiology  [facility]  [address] [place] [address]                             [phone]     [facility].  [address] [place] [address], [address] [place] [address]  [phone]

## 2022-11-04 ENCOUNTER — OFFICE VISIT (OUTPATIENT)
Dept: CARDIOLOGY CLINIC | Age: 77
End: 2022-11-04
Payer: MEDICARE

## 2022-11-04 VITALS
HEART RATE: 71 BPM | SYSTOLIC BLOOD PRESSURE: 120 MMHG | OXYGEN SATURATION: 100 % | WEIGHT: 128.6 LBS | DIASTOLIC BLOOD PRESSURE: 80 MMHG | HEIGHT: 68 IN | RESPIRATION RATE: 16 BRPM | BODY MASS INDEX: 19.49 KG/M2

## 2022-11-04 DIAGNOSIS — R55 VASOVAGAL SYNCOPE: ICD-10-CM

## 2022-11-04 DIAGNOSIS — E03.9 ACQUIRED HYPOTHYROIDISM: ICD-10-CM

## 2022-11-04 DIAGNOSIS — Z86.73 HISTORY OF CVA (CEREBROVASCULAR ACCIDENT): ICD-10-CM

## 2022-11-04 DIAGNOSIS — I47.1 SVT (SUPRAVENTRICULAR TACHYCARDIA) (HCC): Primary | ICD-10-CM

## 2022-11-04 DIAGNOSIS — E78.00 HYPERCHOLESTEROLEMIA: ICD-10-CM

## 2022-11-04 DIAGNOSIS — I47.1 SVT (SUPRAVENTRICULAR TACHYCARDIA) (HCC): ICD-10-CM

## 2022-11-04 PROCEDURE — 93005 ELECTROCARDIOGRAM TRACING: CPT | Performed by: INTERNAL MEDICINE

## 2022-11-04 PROCEDURE — 99215 OFFICE O/P EST HI 40 MIN: CPT | Performed by: INTERNAL MEDICINE

## 2022-11-04 PROCEDURE — G0463 HOSPITAL OUTPT CLINIC VISIT: HCPCS | Performed by: INTERNAL MEDICINE

## 2022-11-04 PROCEDURE — 1123F ACP DISCUSS/DSCN MKR DOCD: CPT | Performed by: INTERNAL MEDICINE

## 2022-11-04 PROCEDURE — 93010 ELECTROCARDIOGRAM REPORT: CPT | Performed by: INTERNAL MEDICINE

## 2022-11-04 RX ORDER — MIDODRINE HYDROCHLORIDE 2.5 MG/1
2.5 TABLET ORAL 3 TIMES DAILY
Qty: 90 TABLET | Refills: 5 | Status: SHIPPED | OUTPATIENT
Start: 2022-11-04 | End: 2022-11-15

## 2022-11-04 NOTE — PATIENT INSTRUCTIONS
Schedule for EPS/SVT ablation next available  Start Midodrine 2.5 mg TID for vasovagal syncope  Recommend aggressive hydration, especially during exercise, illness, or in hot environment. Recommended assuming the supine position in the setting of onset of prodrome symptoms. Consider TEDs stocking to improve venous return. FU with Dr. Jerome Carlson 1 month post ablation    Your EP Study & SVT Ablation procedure has been scheduled for 11/21/22 at 12:00 pm, at [facility].    Please report to Admitting Department by 10:00 am, or 2 hours prior to your scheduled procedure. Please bring a list of your current medications and medication bottles, if able, to the hospital on this day. You will be unable to drive after your procedure so please make sure to bring someone with you to your procedure. You will need to have nothing to eat or drink after midnight, the night prior to your procedure. You may have small sips of water, if needed, to take with your medication. You will need labs drawn prior to your procedure. Please go to have this done today. You should not stop your medication prior to your scheduled procedure. After your procedure, you will need to follow up with Dr. Jerome Carlson.  Your follow-up appointment has been scheduled for 12/16/22 at 2:30 pm.

## 2022-11-05 LAB
ANION GAP SERPL CALC-SCNC: 5 MMOL/L (ref 5–15)
BASOPHILS # BLD: 0 K/UL (ref 0–0.1)
BASOPHILS NFR BLD: 1 % (ref 0–1)
BUN SERPL-MCNC: 10 MG/DL (ref 6–20)
BUN/CREAT SERPL: 14 (ref 12–20)
CALCIUM SERPL-MCNC: 9.5 MG/DL (ref 8.5–10.1)
CHLORIDE SERPL-SCNC: 104 MMOL/L (ref 97–108)
CO2 SERPL-SCNC: 29 MMOL/L (ref 21–32)
CREAT SERPL-MCNC: 0.72 MG/DL (ref 0.55–1.02)
DIFFERENTIAL METHOD BLD: NORMAL
EOSINOPHIL # BLD: 0 K/UL (ref 0–0.4)
EOSINOPHIL NFR BLD: 0 % (ref 0–7)
ERYTHROCYTE [DISTWIDTH] IN BLOOD BY AUTOMATED COUNT: 13.3 % (ref 11.5–14.5)
GLUCOSE SERPL-MCNC: 107 MG/DL (ref 65–100)
HCT VFR BLD AUTO: 39.9 % (ref 35–47)
HGB BLD-MCNC: 12.4 G/DL (ref 11.5–16)
IMM GRANULOCYTES # BLD AUTO: 0 K/UL (ref 0–0.04)
IMM GRANULOCYTES NFR BLD AUTO: 0 % (ref 0–0.5)
LYMPHOCYTES # BLD: 1.2 K/UL (ref 0.8–3.5)
LYMPHOCYTES NFR BLD: 17 % (ref 12–49)
MCH RBC QN AUTO: 28.9 PG (ref 26–34)
MCHC RBC AUTO-ENTMCNC: 31.1 G/DL (ref 30–36.5)
MCV RBC AUTO: 93 FL (ref 80–99)
MONOCYTES # BLD: 0.5 K/UL (ref 0–1)
MONOCYTES NFR BLD: 8 % (ref 5–13)
NEUTS SEG # BLD: 5.1 K/UL (ref 1.8–8)
NEUTS SEG NFR BLD: 74 % (ref 32–75)
NRBC # BLD: 0 K/UL (ref 0–0.01)
NRBC BLD-RTO: 0 PER 100 WBC
PLATELET # BLD AUTO: 322 K/UL (ref 150–400)
PMV BLD AUTO: 11 FL (ref 8.9–12.9)
POTASSIUM SERPL-SCNC: 4.2 MMOL/L (ref 3.5–5.1)
RBC # BLD AUTO: 4.29 M/UL (ref 3.8–5.2)
SODIUM SERPL-SCNC: 138 MMOL/L (ref 136–145)
WBC # BLD AUTO: 6.9 K/UL (ref 3.6–11)

## 2022-11-17 RX ORDER — SODIUM CHLORIDE 0.9 % (FLUSH) 0.9 %
5-40 SYRINGE (ML) INJECTION EVERY 8 HOURS
Status: CANCELLED | OUTPATIENT
Start: 2022-11-17

## 2022-11-17 RX ORDER — SODIUM CHLORIDE 0.9 % (FLUSH) 0.9 %
5-40 SYRINGE (ML) INJECTION AS NEEDED
Status: CANCELLED | OUTPATIENT
Start: 2022-11-17

## 2022-12-05 ENCOUNTER — HOSPITAL ENCOUNTER (OUTPATIENT)
Age: 77
Setting detail: OUTPATIENT SURGERY
Discharge: HOME | End: 2022-12-05
Attending: INTERNAL MEDICINE | Admitting: INTERNAL MEDICINE
Payer: MEDICARE

## 2022-12-05 VITALS
HEIGHT: 68 IN | OXYGEN SATURATION: 97 % | WEIGHT: 127.3 LBS | RESPIRATION RATE: 21 BRPM | BODY MASS INDEX: 19.29 KG/M2 | TEMPERATURE: 98 F | DIASTOLIC BLOOD PRESSURE: 106 MMHG | HEART RATE: 80 BPM | SYSTOLIC BLOOD PRESSURE: 151 MMHG

## 2022-12-05 DIAGNOSIS — I66.9 STENOSIS OF INTRACRANIAL VESSEL: ICD-10-CM

## 2022-12-05 DIAGNOSIS — I47.1 PAROXYSMAL SUPRAVENTRICULAR TACHYCARDIA (HCC): ICD-10-CM

## 2022-12-05 DIAGNOSIS — Z86.73 HISTORY OF STROKE WITHIN LAST YEAR: ICD-10-CM

## 2022-12-05 DIAGNOSIS — I67.2 INTRACRANIAL ATHEROSCLEROSIS: ICD-10-CM

## 2022-12-05 LAB
ANION GAP SERPL CALC-SCNC: 6 MMOL/L (ref 5–15)
BUN SERPL-MCNC: 8 MG/DL (ref 6–20)
BUN/CREAT SERPL: 9 (ref 12–20)
CALCIUM SERPL-MCNC: 9.7 MG/DL (ref 8.5–10.1)
CHLORIDE SERPL-SCNC: 106 MMOL/L (ref 97–108)
CO2 SERPL-SCNC: 30 MMOL/L (ref 21–32)
CREAT SERPL-MCNC: 0.87 MG/DL (ref 0.55–1.02)
ERYTHROCYTE [DISTWIDTH] IN BLOOD BY AUTOMATED COUNT: 13.9 % (ref 11.5–14.5)
GLUCOSE SERPL-MCNC: 101 MG/DL (ref 65–100)
HCT VFR BLD AUTO: 42 % (ref 35–47)
HGB BLD-MCNC: 13.2 G/DL (ref 11.5–16)
MCH RBC QN AUTO: 29 PG (ref 26–34)
MCHC RBC AUTO-ENTMCNC: 31.4 G/DL (ref 30–36.5)
MCV RBC AUTO: 92.3 FL (ref 80–99)
NRBC # BLD: 0 K/UL (ref 0–0.01)
NRBC BLD-RTO: 0 PER 100 WBC
PLATELET # BLD AUTO: 274 K/UL (ref 150–400)
PMV BLD AUTO: 10.6 FL (ref 8.9–12.9)
POTASSIUM SERPL-SCNC: 4 MMOL/L (ref 3.5–5.1)
RBC # BLD AUTO: 4.55 M/UL (ref 3.8–5.2)
SODIUM SERPL-SCNC: 142 MMOL/L (ref 136–145)
WBC # BLD AUTO: 7.2 K/UL (ref 3.6–11)

## 2022-12-05 PROCEDURE — 76937 US GUIDE VASCULAR ACCESS: CPT | Performed by: INTERNAL MEDICINE

## 2022-12-05 PROCEDURE — C1730 CATH, EP, 19 OR FEW ELECT: HCPCS | Performed by: INTERNAL MEDICINE

## 2022-12-05 PROCEDURE — C1760 CLOSURE DEV, VASC: HCPCS | Performed by: INTERNAL MEDICINE

## 2022-12-05 PROCEDURE — 93623 PRGRMD STIMJ&PACG IV RX NFS: CPT | Performed by: INTERNAL MEDICINE

## 2022-12-05 PROCEDURE — 77030010869 HC CBL EP ABL STJU -B: Performed by: INTERNAL MEDICINE

## 2022-12-05 PROCEDURE — 93005 ELECTROCARDIOGRAM TRACING: CPT

## 2022-12-05 PROCEDURE — 74011250636 HC RX REV CODE- 250/636: Performed by: INTERNAL MEDICINE

## 2022-12-05 PROCEDURE — 77030027107 HC PTCH EXT REF CARTO3 J&J -F: Performed by: INTERNAL MEDICINE

## 2022-12-05 PROCEDURE — 93653 COMPRE EP EVAL TX SVT: CPT | Performed by: INTERNAL MEDICINE

## 2022-12-05 PROCEDURE — 36415 COLL VENOUS BLD VENIPUNCTURE: CPT

## 2022-12-05 PROCEDURE — 74011000258 HC RX REV CODE- 258: Performed by: INTERNAL MEDICINE

## 2022-12-05 PROCEDURE — 33285 INSJ SUBQ CAR RHYTHM MNTR: CPT | Performed by: INTERNAL MEDICINE

## 2022-12-05 PROCEDURE — 80048 BASIC METABOLIC PNL TOTAL CA: CPT

## 2022-12-05 PROCEDURE — C1764 EVENT RECORDER, CARDIAC: HCPCS | Performed by: INTERNAL MEDICINE

## 2022-12-05 PROCEDURE — 74011000250 HC RX REV CODE- 250: Performed by: INTERNAL MEDICINE

## 2022-12-05 PROCEDURE — C1733 CATH, EP, OTHR THAN COOL-TIP: HCPCS | Performed by: INTERNAL MEDICINE

## 2022-12-05 PROCEDURE — C1732 CATH, EP, DIAG/ABL, 3D/VECT: HCPCS | Performed by: INTERNAL MEDICINE

## 2022-12-05 PROCEDURE — 85027 COMPLETE CBC AUTOMATED: CPT

## 2022-12-05 PROCEDURE — 77030003390 HC NDL ANGI MRTM -A: Performed by: INTERNAL MEDICINE

## 2022-12-05 PROCEDURE — 77030031139 HC SUT VCRL2 J&J -A: Performed by: INTERNAL MEDICINE

## 2022-12-05 PROCEDURE — 99152 MOD SED SAME PHYS/QHP 5/>YRS: CPT | Performed by: INTERNAL MEDICINE

## 2022-12-05 PROCEDURE — C1894 INTRO/SHEATH, NON-LASER: HCPCS | Performed by: INTERNAL MEDICINE

## 2022-12-05 PROCEDURE — 93620 COMP EP EVL R AT VEN PAC&REC: CPT | Performed by: INTERNAL MEDICINE

## 2022-12-05 PROCEDURE — 99153 MOD SED SAME PHYS/QHP EA: CPT | Performed by: INTERNAL MEDICINE

## 2022-12-05 PROCEDURE — 77030018729 HC ELECTRD DEFIB PAD CARD -B: Performed by: INTERNAL MEDICINE

## 2022-12-05 PROCEDURE — 77030037400 HC ADH TISS HI VISC EXOFIN CHMP -B: Performed by: INTERNAL MEDICINE

## 2022-12-05 PROCEDURE — C1769 GUIDE WIRE: HCPCS | Performed by: INTERNAL MEDICINE

## 2022-12-05 DEVICE — ICM LNQ22 LINQ II USA
Type: IMPLANTABLE DEVICE | Status: FUNCTIONAL
Brand: LINQ II™

## 2022-12-05 RX ORDER — HYDROCODONE BITARTRATE AND ACETAMINOPHEN 5; 325 MG/1; MG/1
1 TABLET ORAL
Status: DISCONTINUED | OUTPATIENT
Start: 2022-12-05 | End: 2022-12-05 | Stop reason: HOSPADM

## 2022-12-05 RX ORDER — FENTANYL CITRATE 50 UG/ML
INJECTION, SOLUTION INTRAMUSCULAR; INTRAVENOUS AS NEEDED
Status: DISCONTINUED | OUTPATIENT
Start: 2022-12-05 | End: 2022-12-05 | Stop reason: HOSPADM

## 2022-12-05 RX ORDER — HEPARIN SODIUM 200 [USP'U]/100ML
INJECTION, SOLUTION INTRAVENOUS
Status: COMPLETED | OUTPATIENT
Start: 2022-12-05 | End: 2022-12-05

## 2022-12-05 RX ORDER — ONDANSETRON 2 MG/ML
4 INJECTION INTRAMUSCULAR; INTRAVENOUS
Status: DISCONTINUED | OUTPATIENT
Start: 2022-12-05 | End: 2022-12-05 | Stop reason: HOSPADM

## 2022-12-05 RX ORDER — SODIUM CHLORIDE 0.9 % (FLUSH) 0.9 %
5-40 SYRINGE (ML) INJECTION AS NEEDED
Status: DISCONTINUED | OUTPATIENT
Start: 2022-12-05 | End: 2022-12-05 | Stop reason: HOSPADM

## 2022-12-05 RX ORDER — MIDAZOLAM HYDROCHLORIDE 1 MG/ML
INJECTION, SOLUTION INTRAMUSCULAR; INTRAVENOUS AS NEEDED
Status: DISCONTINUED | OUTPATIENT
Start: 2022-12-05 | End: 2022-12-05 | Stop reason: HOSPADM

## 2022-12-05 RX ORDER — GUAIFENESIN 100 MG/5ML
LIQUID (ML) ORAL
Qty: 60 TABLET | Refills: 11 | Status: SHIPPED | OUTPATIENT
Start: 2022-12-05

## 2022-12-05 RX ORDER — NALOXONE HYDROCHLORIDE 0.4 MG/ML
0.4 INJECTION, SOLUTION INTRAMUSCULAR; INTRAVENOUS; SUBCUTANEOUS AS NEEDED
Status: DISCONTINUED | OUTPATIENT
Start: 2022-12-05 | End: 2022-12-05 | Stop reason: HOSPADM

## 2022-12-05 RX ORDER — ACETAMINOPHEN 325 MG/1
650 TABLET ORAL
Status: DISCONTINUED | OUTPATIENT
Start: 2022-12-05 | End: 2022-12-05 | Stop reason: HOSPADM

## 2022-12-05 RX ORDER — SODIUM CHLORIDE 0.9 % (FLUSH) 0.9 %
5-40 SYRINGE (ML) INJECTION EVERY 8 HOURS
Status: DISCONTINUED | OUTPATIENT
Start: 2022-12-05 | End: 2022-12-05 | Stop reason: HOSPADM

## 2022-12-05 RX ORDER — LIDOCAINE HYDROCHLORIDE 10 MG/ML
INJECTION INFILTRATION; PERINEURAL AS NEEDED
Status: DISCONTINUED | OUTPATIENT
Start: 2022-12-05 | End: 2022-12-05 | Stop reason: HOSPADM

## 2022-12-05 NOTE — ROUTINE PROCESS
11:27 AM  Patient arrived. ID and allergies verified verbally with patient. Pt voices understanding of procedure to be performed. Consent obtained. Pt prepped for procedure. 2:05 AM  TRANSFER - OUT REPORT:    Verbal report given to Ep lab(name) on Kayla Trevino  being transferred to EP(unit) for ordered procedure       Report consisted of patients Situation, Background, Assessment and   Recommendations(SBAR). Information from the following report(s) SBAR and Kardex was reviewed with the receiving nurse. Lines:   Peripheral IV 12/05/22 Left Antecubital (Active)   Site Assessment Clean, dry, & intact 12/05/22 1207   Phlebitis Assessment 0 12/05/22 1207   Infiltration Assessment 0 12/05/22 1207   Dressing Status Clean, dry, & intact 12/05/22 1207   Dressing Type Tape;Transparent 12/05/22 1207   Hub Color/Line Status Pink 12/05/22 1207   Action Taken Open ports on tubing capped 12/05/22 1207   Alcohol Cap Used Yes 12/05/22 1207        Opportunity for questions and clarification was provided. Patient transported with:   Registered Nurse    4:41 PM  TRANSFER - IN REPORT:    Verbal report received from Rosamaria(name) on Kayla Trevino  being received from EP(unit) for routine post - op      Report consisted of patients Situation, Background, Assessment and   Recommendations(SBAR). Information from the following report(s) Procedure Summary was reviewed with the receiving nurse. Opportunity for questions and clarification was provided. Assessment completed upon patients arrival to unit and care assumed. 5:54 PM  Bedside and Verbal shift change report given to Mariella (oncoming nurse) by Zee Field (offgoing nurse). Report included the following information SBAR, Kardex, and Procedure Summary.

## 2022-12-05 NOTE — H&P
Cardiac Electrophysiology OFFICE Consultation Note     Interval H&P:  No significant interval changes since patient was last seen in clinic. I have discussed in detail the indications, alternatives, benefits and risks of an electrophysiologic study and radiofrequency ablation for SVT. The risks that were discussed included but not limited to bleeding, pericarditis, damage to the electrical conduction system potentially requiring a pacemaker, cardiac tamponade, ventricular arrhythmias, stroke, MI, and death. Patient reports complete understanding of discussions and recommendations and wish to proceed with the procedure.          _________________________________  An Danielle Damon MD, [facility], [address]  Cardiac Electrophysiology  [facility] [place]      Assessment/Plan:   1. SVT (supraventricular tachycardia) (HCC)  -     AMB POC EKG ROUTINE W/ 12 LEADS, INTER & REP  2. Vasovagal syncope  3. History of CVA (cerebrovascular accident)  4. Hypercholesterolemia  5. Acquired hypothyroidism     SVT  History of symptomatic palpitations with associated dizziness and near syncope. Recent event monitor demonstrated clear evidence of sustained narrow complex tachycardia with rate of 222 bpm.  She also had another asymptomatic tachycardic episode about 160 bpm.  Differential diagnosis includes AVNRT versus atrial tachycardia. Given history of palpitation and near syncope, she would benefit from EP study possible SVT ablation  - I have discussed in detail the indications, alternatives, benefits and risks of an electrophysiologic study and radiofrequency ablation for SVT. The risks that were discussed included but not limited to bleeding, pericarditis, damage to the electrical conduction system potentially requiring a pacemaker, cardiac tamponade, ventricular arrhythmias, stroke, MI, and death. Patient reports complete understanding of discussions and recommendations and wish to proceed with the procedure.   - Schedule for EP study possible SVT ablation next available    Vasovagal syncope  History of vasovagal syncope. Unclear if this has been symptomatic SVT alone. However she does have symptoms predominantly during the summer months. - We will trial midodrine 2.5 mg 3 times daily  - Educated the patient on the mechanism of vasovagal syncope, recognizing prodrome symptoms, and the situations that could predispose to vasovagal syncope. Recommended aggressive hydration, especially during exercise, illness, or in hot environment. Recommended assuming the supine position in the setting of onset of prodrome symptoms. Consider TEDs stocking to improve venous return. History of CVA  Continue aspirin 81 mg and Crestor 10 mg daily    Hypothyroidism  Continue levothyroxine 88 mcg daily    Subjective:       Kayla Trevino is a 68 y.o. patient who is seen for evaluation of SVT and history of syncope. Remote history of syncope during the summer months. She's noticing increased frequency of palpitations. Sensation of palpitations is associated with near syncope. Recent event monitor demonstrated SVT with HR at 220 bpm correlating with symptoms of palpitations. Otherwise denies of any chest pain. .    Patient Active Problem List   Diagnosis Code    Hypercholesterolemia E78.00    Hypothyroidism E03.9    Osteopenia M85.80    Advance directive discussed with patient Z71.89    Gastroesophageal reflux disease without esophagitis K21.9    Intracranial atherosclerosis I67.2    History of CVA (cerebrovascular accident) Z86.73    SVT (supraventricular tachycardia) ([facility]) I47.1    Vasovagal syncope R55     Current Outpatient Medications   Medication Sig Dispense Refill    midodrine (PROAMATINE) 2.5 mg tablet Take 1 Tablet by mouth three (3) times daily. 90 Tablet 5    levothyroxine (SYNTHROID) 88 mcg tablet Take 1 Tablet by mouth Daily (before breakfast). 90 Tablet 0    rosuvastatin (CRESTOR) 10 mg tablet Take 1 Tab by mouth nightly.  80 Tablet 2    famotidine (PEPCID) 20 mg tablet Take 20 mg by mouth daily as needed for Heartburn. aspirin 81 mg chewable tablet Take 1 Tab by mouth two (2) times a day. Indications: stroke prevention 60 Tab 11    CALCIUM CARBONATE/VITAMIN D3 (CALCIUM 600 + D PO) Take 1 Tab by mouth two (2) times a day. multivitamins-minerals-lutein (MEN'S CENTRUM SILVER WITH LUTEIN) tab tablet Take  by mouth. Allergies   Allergen Reactions    Indocin [Indomethacin Sodium] Shortness of Breath and Swelling     Ankles & legs    Boniva [Ibandronate] Other (comments)     indigestion    Fosamax [Alendronate] Other (comments)     Indigestion      Plavix [Clopidogrel] Diarrhea     severe     Past Medical History:   Diagnosis Date    Acute ischemic stroke ([facility] 10/14/2020    Cancer ([facility] 2016    basal cell skin carcinoma removed. Cerebral artery occlusion with [facility] 10/2020    Gastroesophageal reflux disease without esophagitis 2016    Herpes     right gluteal outbreaks    Osteoporosis     Snoring     Thyroid disease     hypothyroidism    Venous malformation 2015    Left foot and has had embolization procedure. Venous malformation 2015    Left foot and has had embolization procedure. 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  All negative except for HPI     Objective:   /80 Pulse 71   Resp 16   Ht 5' 8\" (1.727 m)   Wt 128 lb 9.6 oz (58.3 kg)   SpO2 100%   BMI 19.55 kg/m²     Physical Exam:   General:  Alert and oriented, in no acute distress  Head:  Atraumatic, normocephalic  Eyes:  extraocular muscles intact  Neck:  Supple, normal range of motion  Lungs:  Clear to auscultation bilaterally, no wheezes/rales/rhonchi   Cardiovascular:  Regular rate and rhythm, normal S1-S2, no murmurs/rubs/gallops  Abdomen:  Soft, nontender, nondistended, normoactive bowel sounds  Skin:  Intact, no rash  Extremities:, no clubbing, cyanosis, or edema  Musculoskeletal: normal range of motion  Neurological:  Alert and oriented, no focal neurologic deficits  Psychiatric:  Normal mood and affect    Lab Results   Component Value Date/Time    Hemoglobin A1c 5.9 (H) 10/15/2020 01:09 AM     EKG: Sinus rhythm at 71 bpm, no evidence of ischemic changes. 10/14/20    ECHO ADULT COMPLETE 10/15/2020 10/15/2020    Interpretation Summary  · Saline contrast was given to evaluate for intracardiac shunt. · LV: Estimated LVEF is 60 - 65%. Normal cavity size, wall thickness and systolic function (ejection fraction normal).  Wall motion: normal.    Signed by: Maria D Marie MD on 10/15/2020  7:36 PM        Results for orders placed or performed during the hospital encounter of 01/01/21   EKG, 12 LEAD, INITIAL   Result Value Ref Range    Ventricular Rate 61 BPM    Atrial Rate 61 BPM    P-R Interval 152 ms    QRS Duration 78 ms    Q-T Interval 398 ms    QTC Calculation (Bezet) 400 ms    Calculated P Axis 79 degrees    Calculated R Axis 86 degrees    Calculated T Axis 81 degrees    Diagnosis       Normal sinus rhythm  Right atrial enlargement  When compared with ECG of 14-OCT-2020 17:11,  No significant change was found  Confirmed by Kirti Gibson ([id number]) on 1/3/2021 2:21:24 PM               Thank you for involving me in this patient's care and please call with further concerns or questions. ________________________________________  An Julee Cardona MD, [facility]  Cardiac Electrophysiology  [facility] [place]  [address] [facility], [address] [place] [address] Avenue                             [phone]     [address] [place] [address].  [address] [place] [address], [address] [facility]  [phone]

## 2022-12-05 NOTE — Clinical Note
TRANSFER - IN REPORT:     Verbal report received from: Megan Moss RN. Report consisted of patient's Situation, Background, Assessment and   Recommendations(SBAR). Opportunity for questions and clarification was provided. Assessment completed upon patient's arrival to unit and care assumed. Patient transported with a Registered Nurse and [address] [place] [address] / [facility] [employer].

## 2022-12-05 NOTE — ROUTINE PROCESS
6:04 PM  Assumed care for patient. TRANSFER - IN REPORT:    Verbal report received from brooks hayes.(name) on Rell Dorsey  being received from brooks Aparicio. To carrington villalobos(unit) for routine progression of care      Report consisted of patients Situation, Background, Assessment and   Recommendations(SBAR). Information from the following report(s) Procedure Summary was reviewed with the receiving nurse. Opportunity for questions and clarification was provided. Assessment completed upon patients arrival to unit and care assumed. Groin sites visualized with both nurses. CDI. 1815  Patient ambulates in hallway or on unit. 1830  Discharge instructions reviewed with patient and family. Voiced understanding. Patient given copy of discharge instructions to take home. Pt discharged via wheelchair with . Personal belongings with patient upon discharge.

## 2022-12-05 NOTE — PROCEDURES
ATYPICAL ATRIOVENTRICULAR JUDY REENTRANT TACHYCARDIA ABLATION  LOOP RECORDER IMPLANTATION      Procedure Date: 12/05/22  Lab Physician: Tricia Damon MD    Indications:  67 yo woman with a history of cryptogenic CVA, vasovagal syncope, HLD, history of palpitations and SVT with HR up to 222 bpm now referred for EPS/SVT ablation. In the setting of history of cryptogenic CVA and recurrent syncope, patient is also referred for loop recorder implantation. PROCEDURE NARRATIVE:  After informed consent was obtained, the patient was brought to the electrophysiology laboratory in the fasting state, and prepped and draped in the usual sterile manner. Local anesthetic was delivered to both groins, and sheaths and catheters were placed in the heart via the femoral veins under fluoroscopic guidance, as described below. Conscious sedation was administered by the anesthesia and nursing staff independent of those performing the procedure under my supervision with intermittent dosing of anxiolytics and narcotics. SHEATH INSERTION AND ULTRASOUND-GUIDED ACCESS  Both groins were infiltrated with Lidocaine (1%) for local anesthesia. All sheaths were placed using the modified Seldinger technique with ultrasound guided assistance (ultrasound evaluation of possible access sites. Patency of the selected vessel. Realtime visualization of the vascular needle entry was performed). Any long sheaths used were advanced to the heart over a guidewire using fluoroscopic guidance. A 8F and 6F sheaths were inserted into the left femoral vein (LVF). An 8F and 6F sheaths were inserted into the right femoral vein (RFV).     CATHETER INSERTION  Catheters were advanced to the following positions using fluoroscopic guidance:  RA: 6Fr Biosense Quadripolar catheter  RV: 6Fr Biosense Quadripolar catheter  His Bundle: 6Fr CRD2 Quadripolar Catheter  Coronary Sinus: Biosense steerable decapolar catheter  Ablation: 4mm Biosense non-irrigated Blake ablation catheter    ELECTROPHYSIOLOGIC STUDY (EPS)  The baseline ECG revealed sinus rhythm. A complete EPS was performed with programmed and incremental stimulation in the RA and RV at least twice the diastolic pacing threshold. VA conduction was present, concentric and decremental. Dual AV viky physiology was present evident by inducible atypical and typical AV viky echos and atypical AVNRT as noted below. Evidence of an accessory pathway was not present. AH 90 ms   ms  AV viky wenckebach cycle length (AVNWB CL) 340 ms  VAWB  ms  Anterograde AV Viky Effective Refractory Period (AVNERP) 600/290 ms  VAERP 600/310 ms  Atrial ERP (AERP) 600/250 ms  Ventricular ERP (VERP) 600/240 ms    ARRHYTHMIAS OBSERVED:  After placement of catheters, patient spontaneously went into atrial fibrillation. The tachycardia persistent for almost 10 mins. At one point, the arrhythmia organized into an atrial flutter with TCL of 220 ms. Activation was proximal to distal in the CS. Entrainment was not able to be performed due to the AFL degenerated back into Afib. Subsequently, Afib terminated spontaneously. An EP study was performed and SVT was initiated with program stimulation from pCS at 500/320/270 ms. The TCL was 340 ms with a VA interval of 90 ms with a retrograde atrial activation sequence that was midline and low to high. The mechanism of SVT was proven to be atypical AVNRT based on the following observations:  1. Atrial activation consistent with low-high atrial activation  2. Reproducible atypical AV viky echo with wide echo window  3. Pseudo VAAV response not seen due to termination of tachycardia with overdrive pacing from the V  4. During overdrive pacing, the atrium were not accelerated to the paced cycle length until after fully paced RV QRS complexes were present (no manifest fusion).     ABLATION  Using a KRAFTWERK 4mm ablation catheter and On-Q-ity 3D mapping system a \"His cloud\" map was created to sabina the His region. The CS os was identified and the [employer] was defined. RF ablation was then performed in the slow pathway region in [employer], anterior to the os of the CS and inferior to the His bundle. Brief run of accelerated junctional rhythm was seen with application of RF without evidence of VA block. Total of 3 ablation lesions were performed for total ablation time of 2 mins and 20 seconds. POST ABLATION TESTING  Post ablation, after a waiting period of >20 minutes, program stimulation was performed with up to triple atrial extrastimuli from the pCS on and off of Isuprel (up to 5 mcg/min) down to 500/240/200/200 ms without inducible atypical AVN echos, or inducible arrhythmia. There was a typical AV kwesi echo window of 20 ms. Final AVNERP was 600/280 ms. AVNWB was 340 ms. VA conduction was present and concentric. Final AH 88 ms and HV 30 ms. At the termination of the procedure, the catheters and sheaths were removed, and manual pressure held until hemostasis was obtained. The patient tolerated the procedure well, and left the EP lab in good condition. Conscious sedation was administered, and appropriate monitoring performed, by members of the EP nursing staff. LOOP RECORDER IMPLANTATION  INDICATIONS:  Cryptogenic Stroke and recurrent syncope    PROCEDURE NARRATIVE  After obtaining informed consent, the central chest was prepped and draped in sterile fashion creating a sterile site just lateral to the left side of the sternum at about the 5th intercostal space. This area was infiltrated with lidocaine with epinephrine for local anesthesia. A 1 cm puncture was made with the provided puncture tool and a track was created with the Reveal insertion tool. The Reveal Linq was deployed subcutaneously and the insertion tool was removed. The skin closed with a single layer of 3-0 Vicryl suture and dermabond was applied on the incision.  Gauze and a sterile bio-occlusive dressing was applied over the incision. The procedure was well tolerated and there were no immediate complications. CONCLUSIONS  1. 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 2. No atypical AVN echoes or inducible AVNRT following ablation after a waiting period and with Isuprel administration. 3. Spontaneous Atrial Fibrillation and transient atrial flutter seen with unclear clinical significance. 4. Successful ILR insertion for management of recurrent syncope and cryptogenic CVA. 5. Normal His-Purkinje system. RECOMMENDATIONS  1. Bedrest x 2 hours. 2. Aspirin 325 mg daily x 1 month. 3. Discharge home. 4. Outpatient follow up with Dr. Graciela Lemus in 1 month.

## 2022-12-05 NOTE — Clinical Note
Left femoral vein. Accessed successfully. Femoral access needle used. Using ultrasound guidance. Number of attempts =  2.  Access x2

## 2022-12-05 NOTE — DISCHARGE INSTRUCTIONS
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 There were catheters temporarily placed in your heart through a puncture in the veins in your groin. WHAT TO EXPECT     Mild to moderate, non-painful, bruising or mild swelling at the puncture site is not un-common, and will resolve in 7 - 14 days, and may extend down your thigh as it heals. Application of Ice to the site may help with any tenderness. You have a small gauze dressing applied to the puncture site in your groin. You may remove this the following morning. It is not uncommon to feel palpitations during the healing phase after your ablation you're your palpitations last longer than 30 minutes, or you have recurrent tachycardia for a prolonged time, please contact the office. MEDICATIONS     Take only the medications prescribed to you at discharge. Take four 81 mg aspirin tablets per day for one month. Or take one 325 mg tablet per day for one month. Then resume prior dosing. ACTIVITY     A responsible adult must take you home. Do not drive a car for 24 hours. Rest quietly for the remainder of the day. Do not lift anything greater than 10 pounds for 7 days. Limit bending at the puncture site and use of stairs for at least 2 days. You may remove the bandage and shower the morning after the procedure. Do not take a bath for 3 days. SYMPTOMS THAT NEED TO BE REPORTED IMMEDIATELY     Bleeding at the puncture site. If there is bleeding, lie down and hold firm direct pressure for at least 5 minutes. If the bleeding does not stop, go to the closest emergency room, or call 911. Temperature more than 100.5 F. Redness or warmth at the puncture site. Increasing pain, numbness, coolness or blue discoloration of the extremity where the puncture is located. Pulsating mass at the puncture site.   A new lump at the puncture site, or increasing swelling at the site. Please be aware that a pea or marble sized lump is normal, anything larger or increasing in size should be reported. Bruising at the puncture site that enlarges or becomes painful (some bruising at the site is common and will go away in 7-14 days). Dizziness, lightheadedness, fainting. REMEMBER: If you feel something is an emergency or cannot be handled over the phone, call 911 or go to the closest emergency room. FOLLOW UP CARE     Nurse site/device check 12/13/22 at 11:00 at THE [facility] office  Dr. Martell Richard 1/6/23 at 10:15 at THE [facility] office   Joe Jacobson NP in 3 months        Edenilson Sanchez MD  Cardiac Electrophysiology / Cardiology    [place] [id number].  [address] [place] [address], [facility], [address] [place] [address]  [phone] / [phone] Fax  [phone] / [phone] Fax            Loop Recorder (Linq) Discharge Instructions      Please make sure you have received your Temporary Loop Recorder identification card with your discharge instructions      MEDICATIONS        Take only the medications prescribed to you at discharge. ACTIVITY        Return to your normal activity, except as noted below. Avoid tight clothes or unnecessary pressure over your incision (such as bra straps or seat belts). If it is tender or sensitive to clothing, cover the incision with a soft dressing or pad. Questions about driving are individualized and should be discussed with one of the EP Physicians prior to discharge. SHOWERING        Leave the bandage over your after the Loop Recorder implant. You bandage will be removed in clinic in 7-14 days at your follow up appointment. It is important to keep the bandaged area clean and dry. You may shower around the site until the bandage is removed in clinic. Thereafter, you may shower after the bandage is removed, washing it gently with soap and water. Do not apply any lotions, powders, or perfumes to the incision line. Avoid submerging your incision in water (tub baths, hot tubs, or swimming) for two weeks. DISCHARGE PRECAUTIONS        Record your temperature every day, at the same time, until your follow up. A temperature of 100.5 F, or higher, can be the first sign of infection. This should be reported to your Doctor immediately. Always tell your doctor or dentist that you have a Loop Recorder. In some cases, antibiotics may be prescribed before certain procedures. Your temporary identification will be given to you with these instructions. Keep your device card in your wallet or on your person at all times. You should receive your permanent card, although this may take up to 8-12 weeks. If you do not receive your permanent card, please call the office at [phone] or the phone number provided on your temporary card for the loop recorder company. SYMPTOMS THAT NEED TO BE REPORTED IMMEDIATELY        Temperature more than 100.4 F    Redness or warmth at the incision site, or pain for longer than the first 5 days after the implant. Drainage from the incision site. Swelling around the incision site. REMEMBER: If you feel something is an emergency or cannot be handled over the phone, call 911 or go to the closest emergency room.       Ines Quiroga MD  Cardiac Electrophysiology / Cardiology    [facility] [place] [address], [address] [facility], [address]  [email or web address] StreetHerrera malloyResearch Medical Center  [phone] / [phone] Fax       [phone] / [phone] Fax

## 2022-12-05 NOTE — Clinical Note
Right femoral vein. Accessed successfully. Femoral access needle used. Using ultrasound guidance. Number of attempts =  2.  Access x2

## 2022-12-07 LAB
ATRIAL RATE: 80 BPM
CALCULATED P AXIS, ECG09: 77 DEGREES
CALCULATED R AXIS, ECG10: 77 DEGREES
CALCULATED T AXIS, ECG11: 71 DEGREES
DIAGNOSIS, 93000: NORMAL
P-R INTERVAL, ECG05: 166 MS
Q-T INTERVAL, ECG07: 370 MS
QRS DURATION, ECG06: 74 MS
QTC CALCULATION (BEZET), ECG08: 426 MS
VENTRICULAR RATE, ECG03: 80 BPM

## 2022-12-08 ENCOUNTER — TELEPHONE (OUTPATIENT)
Dept: CARDIOLOGY CLINIC | Age: 77
End: 2022-12-08

## 2022-12-08 NOTE — TELEPHONE ENCOUNTER
Called patient, ID verified using two patient identifiers. Patient s/p SVT ablation and ILR Implant on 12/5/22. She states when she got up this am she felt \"off\", around noon she started feeling lightheaded and generally weak. She is having occasional palpitations. States she feels a little better now, symptoms have eased up a little. She denies any CP, or SOB. She is staying hydrated, does not have ability to check her BP or HR. She is not home alone. Per SAVANNA Valle RN device clinic coordinator patient has not had any events noted on her ILR. Suggested to patient to obtain a BP monitor so that she is able to check and record her BP readings, especially when she is having episodes of lightheadedness. She is going to rest today and increase intake of water. Advised her that I would relay all information to Dr. Prema Burton and Frederick Sellers NP for recommendations. Patient verbalizes understanding and is agreeable to this plan.

## 2022-12-08 NOTE — TELEPHONE ENCOUNTER
Pt having weakness and lightheaded, Pt stated she has been feeling this way longer than 20 minutes. Pt recently had an Ablation 12/5/22 with .        Pt #   [phone]

## 2022-12-09 NOTE — TELEPHONE ENCOUNTER
Called patient, ID verified using two patient identifiers. Patient states she is feeling \"pretty good\" this am. Her  bought a BP monitor but they have not gotten the hang of using it yet. Once they are comfortable using the monitor she will take her BP at least twice daily for one week and report the readings via My Chart message. Patient appreciative of call and agreeable to this plan.

## 2022-12-13 ENCOUNTER — CLINICAL SUPPORT (OUTPATIENT)
Dept: CARDIOLOGY CLINIC | Age: 77
End: 2022-12-13

## 2022-12-13 ENCOUNTER — OFFICE VISIT (OUTPATIENT)
Dept: CARDIOLOGY CLINIC | Age: 77
End: 2022-12-13

## 2022-12-13 DIAGNOSIS — Z95.818 STATUS POST PLACEMENT OF IMPLANTABLE LOOP RECORDER: Primary | ICD-10-CM

## 2022-12-13 DIAGNOSIS — Z95.818 IMPLANTABLE LOOP RECORDER PRESENT: Primary | ICD-10-CM

## 2022-12-13 NOTE — PROGRESS NOTES
C/ post 2 week implanted loop recorder. Device functioning appropriately as programmed. Patient presents for wound check post-device implantation. The dressing was removed and the site was inspected by Mamadou Daily RN. The site appeared to be well-healing without ecchymosis/tenderness/erythema. Denies pain, fevers, discharge. Continue follow up in device clinic as planned. See scanned documents in media manger.

## 2022-12-13 NOTE — PROGRESS NOTES
Cardiac Electrophysiology Wound Check Note      Wound Check   Patient is here for wound check s/p ILR. No fever, drainage   Physical Exam   Constitutional: well-developed and well-nourished. Skin: Left side incision is healing without redness, drainage, hematoma. The wound is intact. ASSESSMENT and PLAN   The incision is healing without redness, drainage, hematoma. Intact with skin glue.   Current treatment plan is effective, no change in therapy   Device check shows proper functions    Follow-up Disposition:  Annually or sooner if needed    Patient brought in log of blood pressures:    12/11/22:  -9:15 am 137/72, 58  -3:15 pm 131/65, 60    12/12/22:  -10:30 am 136/73, 66  -4:00 pm 135/67, 59    12/13/22:  -10:30 am 139/84, 73

## 2023-01-04 ENCOUNTER — HOSPITAL ENCOUNTER (OUTPATIENT)
Dept: MAMMOGRAPHY | Age: 78
Discharge: HOME | End: 2023-01-04
Attending: INTERNAL MEDICINE

## 2023-01-04 DIAGNOSIS — R92.8 ABNORMAL MAMMOGRAM: ICD-10-CM

## 2023-01-04 DIAGNOSIS — R92.8 ABNORMAL MAMMOGRAM OF LEFT BREAST: ICD-10-CM

## 2023-01-04 NOTE — PROGRESS NOTES
Pt came in today for a left 6 month follow up from a stereotactic biopsy 6/22. Pt told me she had a Loop Recorder implanted 12/5/2022 and is still sore. After checking with Dr Jo Barragan, the pt decided to reschedule in 6 weeks (2/15/23) to allow for further healing.

## 2023-01-05 NOTE — PROGRESS NOTES
Cardiac Electrophysiology OFFICE Consultation Note       Assessment/Plan:   1. SVT (supraventricular tachycardia) (HCC)  -     AMB POC EKG ROUTINE W/ 12 LEADS, INTER & REP  2. Vasovagal syncope  -     AMB POC EKG ROUTINE W/ 12 LEADS, INTER & REP  3. Hypercholesterolemia  -     AMB POC EKG ROUTINE W/ 12 LEADS, INTER & REP  4. Acquired hypothyroidism  -     AMB POC EKG ROUTINE W/ 12 LEADS, INTER & REP  5. History of CVA (cerebrovascular accident)  -     AMB POC EKG ROUTINE W/ 12 LEADS, INTER & REP  6. Intracranial atherosclerosis  7. History of stroke within last year  8. Stenosis of intracranial vessel     SVT  History of symptomatic palpitations with associated dizziness and near syncope. Recent event monitor demonstrated clear evidence of sustained narrow complex tachycardia with rate of 222 bpm.  She also had another asymptomatic tachycardic episode about 160 bpm.    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 Spontaneous Afib and transient atrial flutter seen with unclear clinical significance. Vasovagal syncope  History of vasovagal syncope. Unclear if this has been symptomatic SVT alone. However she does have symptoms predominantly during the summer months. - We will trial midodrine 2.5 mg 3 times daily  - Educated the patient on the mechanism of vasovagal syncope, recognizing prodrome symptoms, and the situations that could predispose to vasovagal syncope. Recommended aggressive hydration, especially during exercise, illness, or in hot environment. Recommended assuming the supine position in the setting of onset of prodrome symptoms. Consider TEDs stocking to improve venous return.   -- s/p ILR implantation on 12/5/22    ILR  No Afib detected, PVCs burden of 2%  - monthly remote transmission given history of CVA, syncope    History of CVA  Continue aspirin 81 mg and Crestor 10 mg daily  S/p ILR as noted above    Hypothyroidism  Continue levothyroxine 88 mcg daily    Subjective:       Laura Cunningham is a 68 y.o. patient who is seen for evaluation of SVT and history of syncope. Remote history of syncope during the summer months. She's noticing increased frequency of palpitations. Sensation of palpitations is associated with near syncope. Recent event monitor demonstrated SVT with HR at 220 bpm correlating with symptoms of palpitations. Otherwise denies of any chest pain. .    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 Spontaneous Afib and transient atrial flutter seen with unclear clinical significance. ILR was also placed given history of syncope and AF. ILR interrogated today which demonstrated no e/o Afib or SVT since ablation. Patient Active Problem List   Diagnosis Code    Hypercholesterolemia E78.00    Hypothyroidism E03.9    Osteopenia M85.80    Advance directive discussed with patient Z71.89    Gastroesophageal reflux disease without esophagitis K21.9    Intracranial atherosclerosis I67.2    History of CVA (cerebrovascular accident) Z86.73    SVT (supraventricular tachycardia) ([facility]) I47.1    Vasovagal syncope R55     Current Outpatient Medications   Medication Sig Dispense Refill    aspirin 81 mg chewable tablet Take 4 per day for one month. Then resume prior dosing. Indications: stroke prevention (Patient taking differently: Take 162 mg by mouth two (2) times a day. Indications: stroke prevention) 60 Tablet 11    levothyroxine (SYNTHROID) 88 mcg tablet Take 1 Tablet by mouth Daily (before breakfast). 90 Tablet 3    rosuvastatin (CRESTOR) 10 mg tablet Take 1 Tab by mouth nightly. 90 Tablet 2    famotidine (PEPCID) 20 mg tablet Take 20 mg by mouth daily as needed for Heartburn. CALCIUM CARBONATE/VITAMIN D3 (CALCIUM 600 + D PO) Take 1 Tab by mouth two (2) times a day. multivitamins-minerals-lutein (MEN'S CENTRUM SILVER WITH LUTEIN) tab tablet Take  by mouth. Allergies   Allergen Reactions    Indocin [Indomethacin Sodium] Shortness of Breath and Swelling     Ankles & legs    Boniva [Ibandronate] Other (comments)     indigestion    Fosamax [Alendronate] Other (comments)     Indigestion      Plavix [Clopidogrel] Diarrhea     severe     Past Medical History:   Diagnosis Date    [facility] 10/14/2020    Cancer ([facility] 2016    basal cell skin carcinoma removed. Cerebral artery occlusion with cerebral infarction ([facility] 10/2020    Gastroesophageal reflux disease without esophagitis 2016    Herpes     right gluteal outbreaks    Osteoporosis     Snoring     Thyroid disease     hypothyroidism    Venous malformation 2015    Left foot and has had embolization procedure. Venous malformation 2015    Left foot and has had embolization procedure. Vision decreased      Past Surgical History:   Procedure Laterality Date    HX DILATION AND CURETTAGE      HX OTHER SURGICAL  2015    emobolization of left foot for vascular malformation     Family History   Problem Relation Age of Onset    Hypertension Mother     Cancer Father     Heart Disease Father         cad    Cancer Sister 72        breast    Breast Cancer Sister 59     Social History     Tobacco Use    Smoking status: Former     Types: Cigarettes     Quit date:      Years since quittin.0    Smokeless tobacco: Never   Substance Use Topics    Alcohol use: No        Review of Systems:   12 point review of systems was performed.  All negative except for HPI     Objective:   /70 (BP 1 Location: Left arm, BP Patient Position: Sitting, BP Cuff Size: Small adult)   Pulse 64   Resp 14   Ht 5' 8\" (1.727 m)   Wt 122 lb (55.3 kg)   SpO2 99%   BMI 18.55 kg/m²     Physical Exam:   General:  Alert and oriented, in no acute distress  Head:  Atraumatic, normocephalic  Eyes:  extraocular muscles intact  Neck:  Supple, normal range of motion  Lungs:  Clear to auscultation bilaterally, no wheezes/rales/rhonchi   Cardiovascular:  Regular rate and rhythm, normal S1-S2, no murmurs/rubs/gallops  Abdomen:  Soft, nontender, nondistended, normoactive bowel sounds  Skin:  Intact, no rash  Extremities:, no clubbing, cyanosis, or edema  Musculoskeletal: normal range of motion  Neurological:  Alert and oriented, no focal neurologic deficits  Psychiatric:  Normal mood and affect    Lab Results   Component Value Date/Time    Hemoglobin A1c 5.9 (H) 10/15/2020 01:09 AM     EKG: Sinus rhythm at 71 bpm, no evidence of ischemic changes. 10/14/20    ECHO ADULT COMPLETE 10/15/2020 10/15/2020    Interpretation Summary  · Saline contrast was given to evaluate for intracardiac shunt. · LV: Estimated LVEF is 60 - 65%. Normal cavity size, wall thickness and systolic function (ejection fraction normal). Wall motion: normal.    Signed by: Agnieszka Prasad MD on 10/15/2020  7:36 PM        Results for orders placed or performed during the hospital encounter of 12/05/22   EKG, 12 LEAD, INITIAL   Result Value Ref Range    Ventricular Rate 80 BPM    Atrial Rate 80 BPM    P-R Interval 166 ms    QRS Duration 74 ms    Q-T Interval 370 ms    QTC Calculation (Bezet) 426 ms    Calculated P Axis 77 degrees    Calculated R Axis 77 degrees    Calculated T Axis 71 degrees    Diagnosis       Sinus rhythm with premature atrial complexes  Possible Left atrial enlargement  Borderline ECG  Confirmed by Alexandro CARRASCO, Francisca Elizabeth ([id number]) on 12/7/2022 10:13:25 PM               Thank you for involving me in this patient's care and please call with further concerns or questions. ________________________________________  An Arian Elizabeth MD, [facility]  Cardiac Electrophysiology  [facility]  [address] [place] [address], [address] [place] [address] Avenue                             [phone]     [address] [place] [address].  [address] [place] [address], [address] [facility]  [phone]

## 2023-01-06 ENCOUNTER — OFFICE VISIT (OUTPATIENT)
Dept: CARDIOLOGY CLINIC | Age: 78
End: 2023-01-06
Payer: MEDICARE

## 2023-01-06 VITALS
HEIGHT: 68 IN | WEIGHT: 122 LBS | BODY MASS INDEX: 18.49 KG/M2 | HEART RATE: 64 BPM | RESPIRATION RATE: 14 BRPM | SYSTOLIC BLOOD PRESSURE: 138 MMHG | DIASTOLIC BLOOD PRESSURE: 70 MMHG | OXYGEN SATURATION: 99 %

## 2023-01-06 DIAGNOSIS — Z86.73 HISTORY OF CVA (CEREBROVASCULAR ACCIDENT): ICD-10-CM

## 2023-01-06 DIAGNOSIS — R55 VASOVAGAL SYNCOPE: ICD-10-CM

## 2023-01-06 DIAGNOSIS — Z86.73 HISTORY OF STROKE WITHIN LAST YEAR: ICD-10-CM

## 2023-01-06 DIAGNOSIS — I67.2 INTRACRANIAL ATHEROSCLEROSIS: ICD-10-CM

## 2023-01-06 DIAGNOSIS — Z95.818 PRESENCE OF CARDIAC DEVICE: Primary | ICD-10-CM

## 2023-01-06 DIAGNOSIS — E78.00 HYPERCHOLESTEROLEMIA: ICD-10-CM

## 2023-01-06 DIAGNOSIS — E03.9 ACQUIRED HYPOTHYROIDISM: ICD-10-CM

## 2023-01-06 DIAGNOSIS — I47.1 SVT (SUPRAVENTRICULAR TACHYCARDIA) (HCC): Primary | ICD-10-CM

## 2023-01-06 DIAGNOSIS — I66.9 STENOSIS OF INTRACRANIAL VESSEL: ICD-10-CM

## 2023-01-06 RX ORDER — GUAIFENESIN 100 MG/5ML
81 LIQUID (ML) ORAL DAILY
Qty: 90 TABLET | Refills: 3 | Status: SHIPPED | OUTPATIENT
Start: 2023-01-06

## 2023-01-06 NOTE — PATIENT INSTRUCTIONS
Remote transmission every month  Decrease Aspirin to 81 mg daily  FU with Dr. Sylvia Aschoff in 6 months

## 2023-02-15 ENCOUNTER — HOSPITAL ENCOUNTER (OUTPATIENT)
Dept: MAMMOGRAPHY | Age: 78
Discharge: HOME | End: 2023-02-15
Attending: INTERNAL MEDICINE
Payer: MEDICARE

## 2023-02-15 PROCEDURE — 77061 BREAST TOMOSYNTHESIS UNI: CPT

## 2023-03-01 ENCOUNTER — OFFICE VISIT (OUTPATIENT)
Dept: INTERNAL MEDICINE CLINIC | Age: 78
End: 2023-03-01
Payer: MEDICARE

## 2023-03-01 VITALS
SYSTOLIC BLOOD PRESSURE: 145 MMHG | TEMPERATURE: 98.3 F | RESPIRATION RATE: 16 BRPM | HEART RATE: 73 BPM | BODY MASS INDEX: 19.94 KG/M2 | DIASTOLIC BLOOD PRESSURE: 73 MMHG | OXYGEN SATURATION: 98 % | HEIGHT: 68 IN | WEIGHT: 131.6 LBS

## 2023-03-01 DIAGNOSIS — M81.0 AGE-RELATED OSTEOPOROSIS WITHOUT CURRENT PATHOLOGICAL FRACTURE: ICD-10-CM

## 2023-03-01 DIAGNOSIS — E78.00 PURE HYPERCHOLESTEROLEMIA: Primary | ICD-10-CM

## 2023-03-01 DIAGNOSIS — E03.9 HYPOTHYROIDISM, UNSPECIFIED TYPE: ICD-10-CM

## 2023-03-01 PROCEDURE — 99214 OFFICE O/P EST MOD 30 MIN: CPT | Performed by: INTERNAL MEDICINE

## 2023-03-01 PROCEDURE — G8427 DOCREV CUR MEDS BY ELIG CLIN: HCPCS | Performed by: INTERNAL MEDICINE

## 2023-03-01 PROCEDURE — 1090F PRES/ABSN URINE INCON ASSESS: CPT | Performed by: INTERNAL MEDICINE

## 2023-03-01 PROCEDURE — G0463 HOSPITAL OUTPT CLINIC VISIT: HCPCS | Performed by: INTERNAL MEDICINE

## 2023-03-01 PROCEDURE — G8536 NO DOC ELDER MAL SCRN: HCPCS | Performed by: INTERNAL MEDICINE

## 2023-03-01 PROCEDURE — G8420 CALC BMI NORM PARAMETERS: HCPCS | Performed by: INTERNAL MEDICINE

## 2023-03-01 PROCEDURE — G8510 SCR DEP NEG, NO PLAN REQD: HCPCS | Performed by: INTERNAL MEDICINE

## 2023-03-01 PROCEDURE — 1101F PT FALLS ASSESS-DOCD LE1/YR: CPT | Performed by: INTERNAL MEDICINE

## 2023-03-01 RX ORDER — SODIUM CHLORIDE 0.9 % (FLUSH) 0.9 %
5-40 SYRINGE (ML) INJECTION AS NEEDED
OUTPATIENT
Start: 2023-03-01

## 2023-03-01 RX ORDER — SODIUM CHLORIDE 9 MG/ML
5-250 INJECTION, SOLUTION INTRAVENOUS AS NEEDED
OUTPATIENT
Start: 2023-03-01

## 2023-03-01 RX ORDER — ZOLEDRONIC ACID 5 MG/100ML
5 INJECTION, SOLUTION INTRAVENOUS ONCE
OUTPATIENT
Start: 2023-03-01 | End: 2023-03-01

## 2023-03-01 RX ORDER — SODIUM CHLORIDE 9 MG/ML
5-40 INJECTION INTRAVENOUS AS NEEDED
OUTPATIENT
Start: 2023-03-01

## 2023-03-01 RX ORDER — DIPHENHYDRAMINE HYDROCHLORIDE 50 MG/ML
50 INJECTION, SOLUTION INTRAMUSCULAR; INTRAVENOUS AS NEEDED
Start: 2023-03-01

## 2023-03-01 RX ORDER — ALBUTEROL SULFATE 0.83 MG/ML
2.5 SOLUTION RESPIRATORY (INHALATION) AS NEEDED
Start: 2023-03-01

## 2023-03-01 RX ORDER — ONDANSETRON 2 MG/ML
8 INJECTION INTRAMUSCULAR; INTRAVENOUS AS NEEDED
OUTPATIENT
Start: 2023-03-01

## 2023-03-01 RX ORDER — ACETAMINOPHEN 325 MG/1
650 TABLET ORAL AS NEEDED
Start: 2023-03-01

## 2023-03-01 RX ORDER — HYDROCORTISONE SODIUM SUCCINATE 100 MG/2ML
100 INJECTION, POWDER, FOR SOLUTION INTRAMUSCULAR; INTRAVENOUS AS NEEDED
OUTPATIENT
Start: 2023-03-01

## 2023-03-01 RX ORDER — HEPARIN 100 UNIT/ML
500 SYRINGE INTRAVENOUS AS NEEDED
Start: 2023-03-01

## 2023-03-01 RX ORDER — EPINEPHRINE 1 MG/ML
0.3 INJECTION, SOLUTION, CONCENTRATE INTRAVENOUS AS NEEDED
OUTPATIENT
Start: 2023-03-01

## 2023-03-01 RX ORDER — DIPHENHYDRAMINE HYDROCHLORIDE 50 MG/ML
25 INJECTION, SOLUTION INTRAMUSCULAR; INTRAVENOUS AS NEEDED
Start: 2023-03-01

## 2023-03-01 NOTE — ASSESSMENT & PLAN NOTE
Resume Reclast yearly. Reviewed potential adverse effects.  Referred for Reclast. Subjective   Patient ID: Lazara is a 3 year old female.    Chief Complaint   Patient presents with   • Vomiting     with fever, Tmax-102 at three in the morning,mom gave her tylenol, mom concern about strep due to it going around in       HPI  Woke early this morning at 3am with fever  Tmax 102F  Vomiting, total of 4 times since last night, started at 9pm  Mom gave her tylenol, last dose before bedtime  Went to  yesterday, fine when she came home  Ate dinner last night  NO appetite this morning, just had water  Strep is going around     Patient's medications, allergies, past medical, surgical, social and family histories were reviewed and updated as appropriate.  No past medical history on file.    No past surgical history on file.    Social History     Tobacco Use   • Smoking status: Never Smoker   • Smokeless tobacco: Never Used   Substance Use Topics   • Alcohol use: Not on file   • Drug use: Not on file     Family History   Problem Relation Age of Onset   • Patient is unaware of any medical problems Mother    • Patient is unaware of any medical problems Father    • Diabetes Maternal Grandfather      No current outpatient medications on file.     No current facility-administered medications for this visit.      ALLERGIES:  No Known Allergies    Review of Systems   Constitutional: Positive for appetite change, fever and irritability. Negative for activity change, chills and crying.   HENT: Negative for congestion, ear discharge, ear pain, sore throat and trouble swallowing.    Eyes: Negative.    Respiratory: Negative.    Gastrointestinal: Positive for vomiting. Negative for abdominal pain, constipation and diarrhea.   Genitourinary: Negative for decreased urine volume.   Skin: Negative.    Allergic/Immunologic: Negative.    Neurological: Negative for headaches.       Objective    BP 94/66 (BP Location: RUE - Right upper extremity, Patient Position: Sitting, Cuff Size: Pediatric)   Pulse  126   Temp 98.4 °F (36.9 °C) (Temporal)   Resp 26   Ht 3' 4.35\" (1.025 m)   Wt (!) 20.3 kg (44 lb 12.1 oz)   BMI 19.32 kg/m²   BSA 0.74 m²   Physical Exam  Vitals signs and nursing note reviewed.   Constitutional:       General: She is active.   HENT:      Head: Normocephalic and atraumatic.      Right Ear: Tympanic membrane and ear canal normal.      Left Ear: Tympanic membrane and ear canal normal.      Nose: Nose normal.      Mouth/Throat:      Mouth: Mucous membranes are moist.      Pharynx: Oropharynx is clear. No oropharyngeal exudate or posterior oropharyngeal erythema.   Eyes:      Conjunctiva/sclera: Conjunctivae normal.   Neck:      Musculoskeletal: Normal range of motion.   Cardiovascular:      Rate and Rhythm: Normal rate and regular rhythm.      Heart sounds: No murmur.   Pulmonary:      Effort: Pulmonary effort is normal.      Breath sounds: Normal breath sounds.   Abdominal:      General: Bowel sounds are normal.      Palpations: Abdomen is soft.      Tenderness: There is no tenderness.   Musculoskeletal: Normal range of motion.   Skin:     Findings: No rash.   Neurological:      General: No focal deficit present.      Mental Status: She is alert and oriented for age.       Patient Active Problem List   Diagnosis   • Weight loss   • Bruxism   • Irritation of left eye     Assessment   Problem List Items Addressed This Visit        Other    Exposure to strep throat - Primary    Relevant Orders    POCT RAPID STREP A (Completed)    STREPTOCOCCUS GROUP A (STREPTOCOCCUS PYOGENES), BACTERIAL CULTURE    Viral syndrome      Rapid strep negative, will send for culture given exposures  Reviewed symptomatic and supportive care with rest, fluids, alternating Tylenol and Motrin as needed     Schedule follow up: as needed - Instructed to call if symptoms worsens, do not improve or new symptoms arise

## 2023-03-01 NOTE — PROGRESS NOTES
HISTORY OF PRESENT ILLNESS  Sidra Alejandre is a 68 y.o. female. HPI  Patient presents to discuss her bone density results. Bone density performed on 1/24/23 and Compared with 1/27/2020 exam:    Lumbar spine T- score -2.5  7.4% decrease since previous exam  Left hip - 2.1  Right hip -2.5 a 6.3 % decrease in bilateral hip density since previous exam  Reclast  x 2 years in past, last 1/10. Patient has been intolerant to oral bisphosphonates in the past, which has caused terrible reflux. Reclast  x 2 years in past, last 1/10. She takes Calcium and Vit D3 supplement. She does not have any future planned dental work. Home /69. She recently had an ablation for symptomatic SVT. Patient Active Problem List   Diagnosis Code    Hypercholesterolemia E78.00    Hypothyroidism E03.9    Osteopenia M85.80    Advance directive discussed with patient Z71.89    Gastroesophageal reflux disease without esophagitis K21.9    Intracranial atherosclerosis I67.2    History of CVA (cerebrovascular accident) Z86.73    SVT (supraventricular tachycardia) ([facility]) I47.1    Vasovagal syncope R55     Current Outpatient Medications on File Prior to Visit   Medication Sig Dispense Refill    aspirin 81 mg chewable tablet Take 1 Tablet by mouth daily. Indications: stroke prevention 90 Tablet 3    levothyroxine (SYNTHROID) 88 mcg tablet Take 1 Tablet by mouth Daily (before breakfast). 90 Tablet 3    rosuvastatin (CRESTOR) 10 mg tablet Take 1 Tab by mouth nightly. 90 Tablet 2    famotidine (PEPCID) 20 mg tablet Take 20 mg by mouth daily as needed for Heartburn. CALCIUM CARBONATE/VITAMIN D3 (CALCIUM 600 + D PO) Take 1 Tab by mouth two (2) times a day. multivitamins-minerals-lutein (MEN'S CENTRUM SILVER WITH LUTEIN) tab tablet Take  by mouth. No current facility-administered medications on file prior to visit.        ROS    Physical Exam  Visit Vitals  BP (!) 145/73   Pulse 73   Temp 98.3 °F (36.8 °C) (Temporal)   Resp 16 Ht 5' 8\" (1.727 m)   Wt 131 lb 9.6 oz (59.7 kg)   SpO2 98%   BMI 20.01 kg/m²   Repeat /72  Patient appears well  Heart[de-identified] normal rate, regular rhythm, normal S1, S2, no murmurs, rubs, clicks or gallops. Chest: clear to auscultation, no wheezes, rales or rhonchi,   Extremities: no edema      ASSESSMENT and PLAN  Osteoporosis: resume Reclast yearly. BMP ordered  Reviewed potential adverse effects. Continue Calcium and Vit D  Hyperlipidemia: lipid panel ordered  Hypothyroid: Patient reports that her Synthroid was decreased when she was experiencing palpitations. Will repeat her TSH and T4  H/O SVT: status post ablation.  Managed by specialist

## 2023-03-02 DIAGNOSIS — E03.9 HYPOTHYROIDISM, UNSPECIFIED TYPE: ICD-10-CM

## 2023-03-02 DIAGNOSIS — E78.00 PURE HYPERCHOLESTEROLEMIA: ICD-10-CM

## 2023-03-02 DIAGNOSIS — M81.0 AGE-RELATED OSTEOPOROSIS WITHOUT CURRENT PATHOLOGICAL FRACTURE: ICD-10-CM

## 2023-03-03 LAB
ANION GAP SERPL CALC-SCNC: 4 MMOL/L (ref 5–15)
BUN SERPL-MCNC: 10 MG/DL (ref 6–20)
BUN/CREAT SERPL: 13 (ref 12–20)
CALCIUM SERPL-MCNC: 9.7 MG/DL (ref 8.5–10.1)
CHLORIDE SERPL-SCNC: 103 MMOL/L (ref 97–108)
CHOLEST SERPL-MCNC: 140 MG/DL
CO2 SERPL-SCNC: 31 MMOL/L (ref 21–32)
CREAT SERPL-MCNC: 0.8 MG/DL (ref 0.55–1.02)
GLUCOSE SERPL-MCNC: 95 MG/DL (ref 65–100)
HDLC SERPL-MCNC: 76 MG/DL
HDLC SERPL: 1.8 (ref 0–5)
LDLC SERPL CALC-MCNC: 50.4 MG/DL (ref 0–100)
POTASSIUM SERPL-SCNC: 4.7 MMOL/L (ref 3.5–5.1)
SODIUM SERPL-SCNC: 138 MMOL/L (ref 136–145)
T4 FREE SERPL-MCNC: 1.1 NG/DL (ref 0.8–1.5)
TRIGL SERPL-MCNC: 68 MG/DL (ref ?–150)
TSH SERPL DL<=0.05 MIU/L-ACNC: 13.9 UIU/ML (ref 0.36–3.74)
VLDLC SERPL CALC-MCNC: 13.6 MG/DL

## 2023-03-04 DIAGNOSIS — E03.9 HYPOTHYROIDISM, UNSPECIFIED TYPE: Primary | ICD-10-CM

## 2023-03-04 RX ORDER — LEVOTHYROXINE SODIUM 100 UG/1
100 TABLET ORAL
Qty: 30 TABLET | Refills: 2 | Status: SHIPPED | OUTPATIENT
Start: 2023-03-04

## 2023-03-04 NOTE — PROGRESS NOTES
TSH up 13.90  Increase Levothyroxine from 88 mcg every day to 100 mcg every day. My Chart Message will be sent to patient. Order sent to pharmacy. Repeat TSH, FT4 in 6-8 weeks.

## 2023-03-10 ENCOUNTER — OFFICE VISIT (OUTPATIENT)
Dept: CARDIOLOGY CLINIC | Age: 78
End: 2023-03-10

## 2023-03-10 DIAGNOSIS — Z95.818 STATUS POST PLACEMENT OF IMPLANTABLE LOOP RECORDER: Primary | ICD-10-CM

## 2023-03-17 ENCOUNTER — HOSPITAL ENCOUNTER (OUTPATIENT)
Dept: INFUSION THERAPY | Age: 78
Discharge: HOME | End: 2023-03-17
Payer: MEDICARE

## 2023-03-17 VITALS
DIASTOLIC BLOOD PRESSURE: 51 MMHG | TEMPERATURE: 97 F | BODY MASS INDEX: 19.25 KG/M2 | RESPIRATION RATE: 18 BRPM | HEIGHT: 68 IN | WEIGHT: 127 LBS | OXYGEN SATURATION: 100 % | HEART RATE: 65 BPM | SYSTOLIC BLOOD PRESSURE: 130 MMHG

## 2023-03-17 DIAGNOSIS — M81.0 AGE-RELATED OSTEOPOROSIS WITHOUT CURRENT PATHOLOGICAL FRACTURE: Primary | ICD-10-CM

## 2023-03-17 PROCEDURE — 96374 THER/PROPH/DIAG INJ IV PUSH: CPT

## 2023-03-17 PROCEDURE — 74011250636 HC RX REV CODE- 250/636: Performed by: INTERNAL MEDICINE

## 2023-03-17 RX ORDER — SODIUM CHLORIDE 9 MG/ML
5-250 INJECTION, SOLUTION INTRAVENOUS AS NEEDED
OUTPATIENT
Start: 2023-04-01

## 2023-03-17 RX ORDER — DIPHENHYDRAMINE HYDROCHLORIDE 50 MG/ML
25 INJECTION, SOLUTION INTRAMUSCULAR; INTRAVENOUS AS NEEDED
Start: 2023-04-01

## 2023-03-17 RX ORDER — EPINEPHRINE 1 MG/ML
0.3 INJECTION, SOLUTION, CONCENTRATE INTRAVENOUS AS NEEDED
OUTPATIENT
Start: 2023-04-01

## 2023-03-17 RX ORDER — ONDANSETRON 2 MG/ML
8 INJECTION INTRAMUSCULAR; INTRAVENOUS AS NEEDED
OUTPATIENT
Start: 2023-04-01

## 2023-03-17 RX ORDER — DIPHENHYDRAMINE HYDROCHLORIDE 50 MG/ML
50 INJECTION, SOLUTION INTRAMUSCULAR; INTRAVENOUS AS NEEDED
Start: 2023-04-01

## 2023-03-17 RX ORDER — ZOLEDRONIC ACID 5 MG/100ML
5 INJECTION, SOLUTION INTRAVENOUS ONCE
OUTPATIENT
Start: 2023-04-01 | End: 2023-04-01

## 2023-03-17 RX ORDER — ACETAMINOPHEN 325 MG/1
650 TABLET ORAL AS NEEDED
Start: 2023-04-01

## 2023-03-17 RX ORDER — SODIUM CHLORIDE 0.9 % (FLUSH) 0.9 %
5-40 SYRINGE (ML) INJECTION AS NEEDED
OUTPATIENT
Start: 2023-04-01

## 2023-03-17 RX ORDER — HEPARIN 100 UNIT/ML
500 SYRINGE INTRAVENOUS AS NEEDED
Start: 2023-04-01

## 2023-03-17 RX ORDER — SODIUM CHLORIDE 9 MG/ML
5-40 INJECTION INTRAVENOUS AS NEEDED
OUTPATIENT
Start: 2023-04-01

## 2023-03-17 RX ORDER — HYDROCORTISONE SODIUM SUCCINATE 100 MG/2ML
100 INJECTION, POWDER, FOR SOLUTION INTRAMUSCULAR; INTRAVENOUS AS NEEDED
OUTPATIENT
Start: 2023-04-01

## 2023-03-17 RX ORDER — ZOLEDRONIC ACID 5 MG/100ML
5 INJECTION, SOLUTION INTRAVENOUS ONCE
Status: COMPLETED | OUTPATIENT
Start: 2023-03-17 | End: 2023-03-17

## 2023-03-17 RX ORDER — SODIUM CHLORIDE 9 MG/ML
5-250 INJECTION, SOLUTION INTRAVENOUS AS NEEDED
Status: DISCONTINUED | OUTPATIENT
Start: 2023-03-17 | End: 2023-03-18 | Stop reason: HOSPADM

## 2023-03-17 RX ORDER — ALBUTEROL SULFATE 0.83 MG/ML
2.5 SOLUTION RESPIRATORY (INHALATION) AS NEEDED
Start: 2023-04-01

## 2023-03-17 RX ADMIN — SODIUM CHLORIDE 25 ML/HR: 9 INJECTION, SOLUTION INTRAVENOUS at 14:57

## 2023-03-17 RX ADMIN — ZOLEDRONIC ACID 5 MG: 0.05 INJECTION, SOLUTION INTRAVENOUS at 15:03

## 2023-03-17 NOTE — PROGRESS NOTES
[facility] Progress Note    0319: Pt arrived ambulatory to [facility] for Reclast in stable condition. Assessment completed. PIV placed to [facility] with positive blood return. Labs drawn 3/2/2023 and noted to be within parameters. Patient Vitals for the past 12 hrs:   Temp Pulse Resp BP SpO2   03/17/23 1520 -- 65 18 (!) 130/51 100 %   03/17/23 1447 97 °F (36.1 °C) 72 16 (!) 151/55 --       Medications Administered       0.9% sodium chloride infusion       Admin Date  03/17/2023 Action  New Bag Dose  25 mL/hr Rate  25 mL/hr Route  IntraVENous Administered By  Bekah Case, SALVADOR              zoledronic acid (RECLAST) IVPB 5 mg       Admin Date  03/17/2023 Action  New Bag Dose  5 mg Rate  400 mL/hr Route  IntraVENous Administered By  Bekah Case RN             Ms. Ana Yu tolerated the infusion, and had no complaints. PIV flushed and removed. 2x2 and coban placed    Ms. Ana Yu was discharged from [place] [address] in stable condition. Patient is aware of next scheduled OPIC appointment.      Future Appointments   Date Time Provider Glen Lin[place]   4/4/2023 10:00 AM MD NEO Lara BS AMB   6/16/2023  8:00 AM REMOTE1, OSCAR CHAIDEZ BS AMB   7/6/2023 10:40 AM PACEMAKER3, OSCAR CHAIDEZ BS AMB   7/6/2023 11:00 AM Tricia Wallace MD CAVREY BS AMB   11/21/2023 10:40 AM PACEMAKER3, OSCAR CHAIDEZ BS AMB   11/21/2023 11:00 AM Tricia Wallace MD CAVREY BS AMB   3/15/2024  1:00 PM G2 JENNIFER [address] R[address]ina, RN, RN  March 17, 2023

## 2023-03-21 RX ORDER — ROSUVASTATIN CALCIUM 10 MG/1
10 TABLET, COATED ORAL
Qty: 90 TABLET | Refills: 0 | Status: SHIPPED | OUTPATIENT
Start: 2023-03-21

## 2023-04-03 DIAGNOSIS — R92.8 ABNORMAL MAMMOGRAM: Primary | ICD-10-CM

## 2023-04-18 ENCOUNTER — TELEPHONE (OUTPATIENT)
Dept: INTERNAL MEDICINE CLINIC | Age: 78
End: 2023-04-18

## 2023-04-19 DIAGNOSIS — E03.9 HYPOTHYROIDISM, UNSPECIFIED TYPE: Primary | ICD-10-CM

## 2023-04-21 DIAGNOSIS — R92.8 ABNORMAL MAMMOGRAM: Primary | ICD-10-CM

## 2023-04-22 ENCOUNTER — TRANSCRIBE ORDERS (OUTPATIENT)
Facility: HOSPITAL | Age: 78
End: 2023-04-22

## 2023-04-22 DIAGNOSIS — R92.8 ABNORMAL MAMMOGRAM: Primary | ICD-10-CM

## 2023-04-23 DIAGNOSIS — R92.8 ABNORMAL MAMMOGRAM: Primary | ICD-10-CM

## 2023-04-23 DIAGNOSIS — E03.9 HYPOTHYROIDISM, UNSPECIFIED TYPE: Primary | ICD-10-CM

## 2023-04-24 DIAGNOSIS — E03.9 HYPOTHYROIDISM, UNSPECIFIED TYPE: Primary | ICD-10-CM

## 2023-06-19 SDOH — ECONOMIC STABILITY: FOOD INSECURITY: WITHIN THE PAST 12 MONTHS, THE FOOD YOU BOUGHT JUST DIDN'T LAST AND YOU DIDN'T HAVE MONEY TO GET MORE.: NEVER TRUE

## 2023-06-19 SDOH — ECONOMIC STABILITY: HOUSING INSECURITY
IN THE LAST 12 MONTHS, WAS THERE A TIME WHEN YOU DID NOT HAVE A STEADY PLACE TO SLEEP OR SLEPT IN A SHELTER (INCLUDING NOW)?: NO

## 2023-06-19 SDOH — ECONOMIC STABILITY: TRANSPORTATION INSECURITY
IN THE PAST 12 MONTHS, HAS LACK OF TRANSPORTATION KEPT YOU FROM MEETINGS, WORK, OR FROM GETTING THINGS NEEDED FOR DAILY LIVING?: NO

## 2023-06-19 SDOH — ECONOMIC STABILITY: INCOME INSECURITY: HOW HARD IS IT FOR YOU TO PAY FOR THE VERY BASICS LIKE FOOD, HOUSING, MEDICAL CARE, AND HEATING?: NOT HARD AT ALL

## 2023-06-19 SDOH — ECONOMIC STABILITY: FOOD INSECURITY: WITHIN THE PAST 12 MONTHS, YOU WORRIED THAT YOUR FOOD WOULD RUN OUT BEFORE YOU GOT MONEY TO BUY MORE.: NEVER TRUE

## 2023-06-19 ASSESSMENT — PATIENT HEALTH QUESTIONNAIRE - PHQ9
SUM OF ALL RESPONSES TO PHQ QUESTIONS 1-9: 0
SUM OF ALL RESPONSES TO PHQ QUESTIONS 1-9: 0
2. FEELING DOWN, DEPRESSED OR HOPELESS: 0
SUM OF ALL RESPONSES TO PHQ QUESTIONS 1-9: 0
SUM OF ALL RESPONSES TO PHQ9 QUESTIONS 1 & 2: 0
1. LITTLE INTEREST OR PLEASURE IN DOING THINGS: 0
SUM OF ALL RESPONSES TO PHQ QUESTIONS 1-9: 0

## 2023-06-19 ASSESSMENT — LIFESTYLE VARIABLES
HOW OFTEN DO YOU HAVE A DRINK CONTAINING ALCOHOL: 1
HOW OFTEN DO YOU HAVE A DRINK CONTAINING ALCOHOL: NEVER

## 2023-06-21 ENCOUNTER — OFFICE VISIT (OUTPATIENT)
Age: 78
End: 2023-06-21
Payer: MEDICARE

## 2023-06-21 VITALS
OXYGEN SATURATION: 99 % | RESPIRATION RATE: 16 BRPM | DIASTOLIC BLOOD PRESSURE: 64 MMHG | TEMPERATURE: 98.4 F | BODY MASS INDEX: 20.28 KG/M2 | WEIGHT: 133.8 LBS | HEART RATE: 75 BPM | SYSTOLIC BLOOD PRESSURE: 132 MMHG | HEIGHT: 68 IN

## 2023-06-21 DIAGNOSIS — R29.898 WEAKNESS OF BOTH LOWER EXTREMITIES: ICD-10-CM

## 2023-06-21 DIAGNOSIS — I47.1 SVT (SUPRAVENTRICULAR TACHYCARDIA) (HCC): ICD-10-CM

## 2023-06-21 DIAGNOSIS — Z00.00 MEDICARE ANNUAL WELLNESS VISIT, SUBSEQUENT: Primary | ICD-10-CM

## 2023-06-21 DIAGNOSIS — E78.00 HYPERCHOLESTEROLEMIA: ICD-10-CM

## 2023-06-21 DIAGNOSIS — M81.0 AGE-RELATED OSTEOPOROSIS WITHOUT CURRENT PATHOLOGICAL FRACTURE: ICD-10-CM

## 2023-06-21 DIAGNOSIS — E03.9 HYPOTHYROIDISM, UNSPECIFIED TYPE: ICD-10-CM

## 2023-06-21 DIAGNOSIS — R55 VASOVAGAL SYNCOPE: ICD-10-CM

## 2023-06-21 PROCEDURE — 1036F TOBACCO NON-USER: CPT | Performed by: INTERNAL MEDICINE

## 2023-06-21 PROCEDURE — G8427 DOCREV CUR MEDS BY ELIG CLIN: HCPCS | Performed by: INTERNAL MEDICINE

## 2023-06-21 PROCEDURE — 1090F PRES/ABSN URINE INCON ASSESS: CPT | Performed by: INTERNAL MEDICINE

## 2023-06-21 PROCEDURE — G0439 PPPS, SUBSEQ VISIT: HCPCS | Performed by: INTERNAL MEDICINE

## 2023-06-21 PROCEDURE — 99214 OFFICE O/P EST MOD 30 MIN: CPT | Performed by: INTERNAL MEDICINE

## 2023-06-21 PROCEDURE — G8399 PT W/DXA RESULTS DOCUMENT: HCPCS | Performed by: INTERNAL MEDICINE

## 2023-06-21 PROCEDURE — G8420 CALC BMI NORM PARAMETERS: HCPCS | Performed by: INTERNAL MEDICINE

## 2023-06-21 PROCEDURE — 1123F ACP DISCUSS/DSCN MKR DOCD: CPT | Performed by: INTERNAL MEDICINE

## 2023-06-21 RX ORDER — M-VIT,TX,IRON,MINS/CALC/FOLIC 27MG-0.4MG
1 TABLET ORAL DAILY
COMMUNITY

## 2023-06-21 ASSESSMENT — ENCOUNTER SYMPTOMS
BLOOD IN STOOL: 0
SORE THROAT: 0
COUGH: 0
DIARRHEA: 0
ABDOMINAL PAIN: 0
NAUSEA: 0
BACK PAIN: 0
SHORTNESS OF BREATH: 0
CONSTIPATION: 0

## 2023-06-21 NOTE — ASSESSMENT & PLAN NOTE
She just had reclast and will repeat in 1 year. Continue calcium, vitamin D and weightbearing exercise for bone health. Reviewed last bone density.

## 2023-06-21 NOTE — PROGRESS NOTES
Medicare Annual Wellness Visit    Bello Espino is here for Medicare AWV    Assessment & Plan   Medicare annual wellness visit, subsequent  Hypercholesterolemia  Assessment & Plan:   Well-controlled, continue current medications pending work up below  Orders:  -     Comprehensive Metabolic Panel; Future  Hypothyroidism, unspecified type  Assessment & Plan:   Well-controlled, continue current medications pending work up below  Orders:  -     T4, Free; Future  -     TSH; Future  Weakness of both lower extremities  Comments:  She is a increased fall risk and a referral made for physical therapy. She will schedule an appointment. Orders:  -     [facility] - Physical Therapy at [place]  Age-related osteoporosis without current pathological fracture  Assessment & Plan:   She just had reclast and will repeat in 1 year. Orders:  -     Vitamin D 25 Hydroxy; Future  SVT (supraventricular tachycardia) (HCC)  Assessment & Plan:   Monitored by specialist- no acute findings meriting change in the plan  Vasovagal syncope  Assessment & Plan:   Monitored by specialist- no acute findings meriting change in the plan    Recommendations for Preventive Services Due: see orders and patient instructions/AVS.  Recommended screening schedule for the next 5-10 years is provided to the patient in written form: see Patient Instructions/AVS.     No follow-ups on file. Subjective   The following acute and/or chronic problems were also addressed today:  Patient Active Problem List   Diagnosis    History of CVA (cerebrovascular accident)    Intracranial atherosclerosis    Hypothyroidism    Osteopenia    Advance directive discussed with patient    Hypercholesterolemia    Gastroesophageal reflux disease without esophagitis    SVT (supraventricular tachycardia) (HCC)    Vasovagal syncope    Age-related osteoporosis without current pathological fracture   follow up active medical problems and medication management.    Taking medication

## 2023-06-22 LAB
25(OH)D3 SERPL-MCNC: 41.2 NG/ML (ref 30–100)
ALBUMIN SERPL-MCNC: 3.9 G/DL (ref 3.5–5)
ALBUMIN/GLOB SERPL: 1.3 (ref 1.1–2.2)
ALP SERPL-CCNC: 74 U/L (ref 45–117)
ALT SERPL-CCNC: 29 U/L (ref 12–78)
ANION GAP SERPL CALC-SCNC: 4 MMOL/L (ref 5–15)
AST SERPL-CCNC: 21 U/L (ref 15–37)
BILIRUB SERPL-MCNC: 0.3 MG/DL (ref 0.2–1)
BUN SERPL-MCNC: 10 MG/DL (ref 6–20)
BUN/CREAT SERPL: 14 (ref 12–20)
CALCIUM SERPL-MCNC: 10.3 MG/DL (ref 8.5–10.1)
CHLORIDE SERPL-SCNC: 104 MMOL/L (ref 97–108)
CO2 SERPL-SCNC: 32 MMOL/L (ref 21–32)
CREAT SERPL-MCNC: 0.73 MG/DL (ref 0.55–1.02)
GLOBULIN SER CALC-MCNC: 3.1 G/DL (ref 2–4)
GLUCOSE SERPL-MCNC: 114 MG/DL (ref 65–100)
POTASSIUM SERPL-SCNC: 4.4 MMOL/L (ref 3.5–5.1)
PROT SERPL-MCNC: 7 G/DL (ref 6.4–8.2)
SODIUM SERPL-SCNC: 140 MMOL/L (ref 136–145)
T4 FREE SERPL-MCNC: 1.3 NG/DL (ref 0.8–1.5)
TSH SERPL DL<=0.05 MIU/L-ACNC: 3.38 UIU/ML (ref 0.36–3.74)

## 2023-06-22 RX ORDER — LEVOTHYROXINE SODIUM 0.1 MG/1
100 TABLET ORAL
Qty: 90 TABLET | Refills: 3 | Status: SHIPPED | OUTPATIENT
Start: 2023-06-22

## 2023-07-05 PROBLEM — Z98.890 HISTORY OF LOOP RECORDER: Status: ACTIVE | Noted: 2023-07-05

## 2023-07-06 ENCOUNTER — OFFICE VISIT (OUTPATIENT)
Age: 78
End: 2023-07-06
Payer: MEDICARE

## 2023-07-06 VITALS
SYSTOLIC BLOOD PRESSURE: 120 MMHG | WEIGHT: 133 LBS | HEART RATE: 66 BPM | BODY MASS INDEX: 20.16 KG/M2 | OXYGEN SATURATION: 97 % | HEIGHT: 68 IN | DIASTOLIC BLOOD PRESSURE: 80 MMHG

## 2023-07-06 DIAGNOSIS — Z86.73 HISTORY OF CVA (CEREBROVASCULAR ACCIDENT): ICD-10-CM

## 2023-07-06 DIAGNOSIS — Z98.890 HISTORY OF LOOP RECORDER: ICD-10-CM

## 2023-07-06 DIAGNOSIS — E03.9 HYPOTHYROIDISM, UNSPECIFIED TYPE: ICD-10-CM

## 2023-07-06 DIAGNOSIS — I47.1 SVT (SUPRAVENTRICULAR TACHYCARDIA) (HCC): Primary | ICD-10-CM

## 2023-07-06 DIAGNOSIS — R55 VASOVAGAL SYNCOPE: ICD-10-CM

## 2023-07-06 PROCEDURE — 99214 OFFICE O/P EST MOD 30 MIN: CPT | Performed by: INTERNAL MEDICINE

## 2023-07-06 PROCEDURE — G8399 PT W/DXA RESULTS DOCUMENT: HCPCS | Performed by: INTERNAL MEDICINE

## 2023-07-06 PROCEDURE — 93005 ELECTROCARDIOGRAM TRACING: CPT | Performed by: INTERNAL MEDICINE

## 2023-07-06 PROCEDURE — 1036F TOBACCO NON-USER: CPT | Performed by: INTERNAL MEDICINE

## 2023-07-06 PROCEDURE — 1123F ACP DISCUSS/DSCN MKR DOCD: CPT | Performed by: INTERNAL MEDICINE

## 2023-07-06 PROCEDURE — 93010 ELECTROCARDIOGRAM REPORT: CPT | Performed by: INTERNAL MEDICINE

## 2023-07-06 PROCEDURE — 1090F PRES/ABSN URINE INCON ASSESS: CPT | Performed by: INTERNAL MEDICINE

## 2023-07-06 PROCEDURE — G8427 DOCREV CUR MEDS BY ELIG CLIN: HCPCS | Performed by: INTERNAL MEDICINE

## 2023-07-06 PROCEDURE — G8420 CALC BMI NORM PARAMETERS: HCPCS | Performed by: INTERNAL MEDICINE

## 2023-07-06 ASSESSMENT — PATIENT HEALTH QUESTIONNAIRE - PHQ9
SUM OF ALL RESPONSES TO PHQ QUESTIONS 1-9: 0
2. FEELING DOWN, DEPRESSED OR HOPELESS: 0
SUM OF ALL RESPONSES TO PHQ QUESTIONS 1-9: 0
SUM OF ALL RESPONSES TO PHQ9 QUESTIONS 1 & 2: 0
1. LITTLE INTEREST OR PLEASURE IN DOING THINGS: 0

## 2023-07-14 ENCOUNTER — TELEPHONE (OUTPATIENT)
Age: 78
End: 2023-07-14

## 2023-07-14 NOTE — TELEPHONE ENCOUNTER
Document  RE: Import   An Chalino Herman MD   Sent: Fri July 14, 2023  8:09 AM   To: Barbara Darby, RN; Vesna Virgen, RN     Message    Nakia, can you please call the patient and let her know that her monitor demonstrated evidence of new atrial fibrillation. Because of her history of CVA, at this time I would recommend her stopping Aspirin and start Eliquis 2.5 mg twice a day for stroke prevention. We will continue to monitor for future Afib burden but this is the safest option at this time. Thanks. Called patient, ID verified using two patient identifiers. Notified of above results and recommendations. Eliquis prescription sent to patient's pharmacy. 30 day free card faxed to [facility]. Patient verbalized understanding and will call back with any other questions or concerns.     Requested Prescriptions     Signed Prescriptions Disp Refills    apixaban (ELIQUIS) 2.5 MG TABS tablet 60 tablet 5     Sig: Take 1 tablet by mouth 2 times daily     Authorizing Provider: WENCESLAO CHACKO     Ordering User: Vesna Virgen     Future Appointments   Date Time Provider [address] [place] [address]   7/21/2023  8:00 AM REMOTE1ELOISA BS AMB   8/1/2023 12:30 PM [facility] [place] [facility] [place]   3/15/2024  1:00 PM G2 ARIANNA VILLA [facility] PSYCHIATRIC [place]   6/25/2024  1:00 PM MD SATNAM Rodriguez BS AMB   7/24/2024  9:40 AM LAURENCE Grissom NP BS AMB   7/24/2024 10:00 AM PACEMAKER3, ELOISA PINEDA BS AMB   1/14/2025  9:40 AM PACEMAKER3, ELOISA PINEDA BS AMB   1/14/2025 10:00 AM MD MIRIAM Holt BS AMB

## 2023-08-01 ENCOUNTER — HOSPITAL ENCOUNTER (OUTPATIENT)
Facility: HOSPITAL | Age: 78
Discharge: HOME | End: 2023-08-04
Attending: INTERNAL MEDICINE
Payer: MEDICARE

## 2023-08-01 VITALS — HEIGHT: 67 IN | WEIGHT: 133 LBS | BODY MASS INDEX: 20.88 KG/M2

## 2023-08-01 DIAGNOSIS — R92.8 ABNORMAL MAMMOGRAM: ICD-10-CM

## 2023-08-01 PROCEDURE — G0279 TOMOSYNTHESIS, MAMMO: HCPCS

## 2023-09-10 DIAGNOSIS — E78.00 HYPERCHOLESTEROLEMIA: Primary | ICD-10-CM

## 2023-09-11 RX ORDER — ROSUVASTATIN CALCIUM 10 MG/1
TABLET, COATED ORAL
Qty: 90 TABLET | Refills: 1 | Status: SHIPPED | OUTPATIENT
Start: 2023-09-11

## 2023-09-15 PROCEDURE — G2066 INTER DEVC REMOTE 30D: HCPCS | Performed by: INTERNAL MEDICINE

## 2023-09-15 PROCEDURE — 93298 REM INTERROG DEV EVAL SCRMS: CPT | Performed by: INTERNAL MEDICINE

## 2023-11-02 PROCEDURE — 93298 REM INTERROG DEV EVAL SCRMS: CPT | Performed by: INTERNAL MEDICINE

## 2023-11-15 ENCOUNTER — PATIENT MESSAGE (OUTPATIENT)
Age: 78
End: 2023-11-15

## 2023-11-15 DIAGNOSIS — M25.512 LEFT SHOULDER PAIN, UNSPECIFIED CHRONICITY: Primary | ICD-10-CM

## 2023-11-15 DIAGNOSIS — R29.898 WEAKNESS OF BOTH LOWER EXTREMITIES: ICD-10-CM

## 2023-11-15 NOTE — TELEPHONE ENCOUNTER
Swati Cristobal, ASAF [birth year]70 7:21 PM EST    Okay to add to PT order, or appt to be evaluated first?  ----- Message -----  From: Kermitcolin Booker: 11/15/2023 4:08 PM EST  To: Homer Barton  Clinical Staff  Subject: Order #[id number]     I have an appointment scheduled Nov. 21 for physical therapy ([address] location) for weakness of lower extremities. Can you also send a referral for left shoulder/arm pain? Difficulty raising my arm up from the side.     Thank you,  Buck Taylor

## 2023-11-21 ENCOUNTER — HOSPITAL ENCOUNTER (OUTPATIENT)
Facility: HOSPITAL | Age: 78
Setting detail: RECURRING SERIES
Discharge: HOME | End: 2023-11-24
Payer: MEDICARE

## 2023-11-21 PROCEDURE — 97162 PT EVAL MOD COMPLEX 30 MIN: CPT | Performed by: PHYSICAL THERAPIST

## 2023-11-21 PROCEDURE — 97110 THERAPEUTIC EXERCISES: CPT | Performed by: PHYSICAL THERAPIST

## 2023-11-21 NOTE — THERAPY EVALUATION
Physical Therapy at [facility],   a part of [address] [place] [address] Travon White  YVLXQ:[phone] PPO:[phone]                                                                            PHYSICAL THERAPY - MEDICARE EVALUATION/PLAN OF CARE NOTE (updated 3/23)      Date: 2023          Patient Name:  Fabio Reynolds :  1945   Medical   Diagnosis:  Other symptoms and signs involving the musculoskeletal system [R29.898] Treatment Diagnosis:  M25.512  LEFT SHOULDER PAIN  and R26.81   Unsteadiness on feet    Referral Source:  Seng Vilchis MD Provider #:  [id number]                  Insurance: Payor: MEDICARE / Plan: MEDICARE PART A AND B / Product Type: *No Product type* /      Patient  verified yes     Visit #   Current  / Total 1 24   Time   In / Out 1135 am 1229 pm   Total Treatment Time 54   Total Timed Codes 10   1:1 Treatment Time 10      [facility] Totals Reminder:  bill using total billable   min of TIMED therapeutic procedures and modalities. 8-22 min = 1 unit; 23-37 min = 2 units; 38-52 min = 3 units;  53-67 min = 4 units; 68-82 min = 5 units           SUBJECTIVE  Pain Level (0-10 scale): shoulder 0/10 at rest, 10/10 with movement  []constant []intermittent []improving [x]worsening []no change since onset    Any medication changes, allergies to medications, adverse drug reactions, diagnosis change, or new procedure performed?: [x] No    [] Yes (see summary sheet for update)  Medications: Verified on Patient Summary List    Subjective functional status/changes:     Pt reports that she is having L shoulder and arm pain when trying to reach out to the side with insidious onset a few months ago. She has only spoken to PCP regarding shoulder at this time. Pain is located in lateral arm. No night time pain. Also having some pain up into her neck. Pt feels unsteady on her feet for about 1 year.  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

## 2023-11-29 ENCOUNTER — HOSPITAL ENCOUNTER (OUTPATIENT)
Facility: HOSPITAL | Age: 78
Setting detail: RECURRING SERIES
Discharge: HOME | End: 2023-12-02
Payer: MEDICARE

## 2023-11-29 PROCEDURE — 97110 THERAPEUTIC EXERCISES: CPT | Performed by: PHYSICAL THERAPIST

## 2023-11-29 PROCEDURE — 97140 MANUAL THERAPY 1/> REGIONS: CPT | Performed by: PHYSICAL THERAPIST

## 2023-11-29 NOTE — PROGRESS NOTES
PHYSICAL THERAPY - MEDICARE DAILY TREATMENT NOTE (updated 3/23)      Date: 2023          Patient Name:  Aleksandr Ricci :  1945   Medical   Diagnosis:  Other symptoms and signs involving the musculoskeletal system [R29.898] Treatment Diagnosis:  M25.512  LEFT SHOULDER PAIN  and R26.81   Unsteadiness on feet    Referral Source:  Osei Obrien MD Insurance:   Payor: Geoff Farah / Plan: MEDICARE PART A AND B / Product Type: *No Product type* /                     Patient  verified yes     Visit #   Current  / Total 2 24   Time   In / Out 1035 am 1114 am   Total Treatment Time 39   Total Timed Codes 39   1:1 Treatment Time 44      [facility] Totals Reminder:  bill using total billable   min of TIMED therapeutic procedures and modalities. 8-22 min = 1 unit; 23-37 min = 2 units; 38-52 min = 3 units; 53-67 min = 4 units; 68-82 min = 5 units            SUBJECTIVE    Pain Level (0-10 scale): 0/10 at rest    Any medication changes, allergies to medications, adverse drug reactions, diagnosis change, or new procedure performed?: [x] No    [] Yes (see summary sheet for update)  Medications: Verified on Patient Summary List    Subjective functional status/changes:     Pt reports that she is having some trouble with isometric IR and ER. OBJECTIVE      Therapeutic Procedures: Tx Min Billable or 1:1 Min (if diff from Tx Min) Procedure, Rationale, Specifics   10  39618 Manual Therapy (timed):  decrease pain, increase ROM, and decrease trigger points to improve patient's ability to progress to PLOF and address remaining functional goals. The manual therapy interventions were performed at a separate and distinct time from the therapeutic activities interventions .  (see flow sheet as applicable)     Details if applicable:  posterior GH jt mobs, PROM all directions, [facility] R UT   29  91474 Therapeutic Exercise (timed):  increase ROM, strength, coordination, balance, and proprioception to improve patient's ability to

## 2023-12-01 ENCOUNTER — HOSPITAL ENCOUNTER (OUTPATIENT)
Facility: HOSPITAL | Age: 78
Setting detail: RECURRING SERIES
Discharge: HOME | End: 2023-12-04
Payer: MEDICARE

## 2023-12-01 PROCEDURE — 97140 MANUAL THERAPY 1/> REGIONS: CPT | Performed by: PHYSICAL THERAPIST

## 2023-12-01 PROCEDURE — 97110 THERAPEUTIC EXERCISES: CPT | Performed by: PHYSICAL THERAPIST

## 2023-12-01 NOTE — PROGRESS NOTES
PHYSICAL THERAPY - MEDICARE DAILY TREATMENT NOTE (updated 3/23)      Date: 2023          Patient Name:  Harleen Hsu :  1945   Medical   Diagnosis:  Other symptoms and signs involving the musculoskeletal system [R29.898] Treatment Diagnosis:  M25.512  LEFT SHOULDER PAIN  and R26.81   Unsteadiness on feet    Referral Source:  José Manuel Martinez MD Insurance:   Payor: [employer]y Postal / Plan: MEDICARE PART A AND B / Product Type: *No Product type* /                     Patient  verified yes     Visit #   Current  / Total 3 24   Time   In / Out 1130 am 1216 pm   Total Treatment Time 46   Total Timed Codes 46   1:1 Treatment Time 30      [facility] Totals Reminder:  bill using total billable   min of TIMED therapeutic procedures and modalities. 8-22 min = 1 unit; 23-37 min = 2 units; 38-52 min = 3 units; 53-67 min = 4 units; 68-82 min = 5 units            SUBJECTIVE    Pain Level (0-10 scale): 1/10     Any medication changes, allergies to medications, adverse drug reactions, diagnosis change, or new procedure performed?: [x] No    [] Yes (see summary sheet for update)  Medications: Verified on Patient Summary List    Subjective functional status/changes:     Pt reports that she felt good following previous visit, but woke with some lateral arm soreness this morning. OBJECTIVE      Therapeutic Procedures: Tx Min Billable or 1:1 Min (if diff from Tx Min) Procedure, Rationale, Specifics   10 10 24838 Manual Therapy (timed):  decrease pain, increase ROM, and decrease trigger points to improve patient's ability to progress to PLOF and address remaining functional goals. The manual therapy interventions were performed at a separate and distinct time from the therapeutic activities interventions .  (see flow sheet as applicable)     Details if applicable:  posterior GH jt mobs, PROM all directions, STM R UT   36 20 77800 Therapeutic Exercise (timed):  increase ROM, strength, coordination, balance, and proprioception

## 2023-12-06 ENCOUNTER — HOSPITAL ENCOUNTER (OUTPATIENT)
Facility: HOSPITAL | Age: 78
Setting detail: RECURRING SERIES
Discharge: HOME | End: 2023-12-09
Payer: MEDICARE

## 2023-12-06 PROCEDURE — 97140 MANUAL THERAPY 1/> REGIONS: CPT | Performed by: PHYSICAL THERAPIST

## 2023-12-06 PROCEDURE — 97110 THERAPEUTIC EXERCISES: CPT | Performed by: PHYSICAL THERAPIST

## 2023-12-06 NOTE — PROGRESS NOTES
functional goals. (see flow sheet as applicable)     Details if applicable:     42     Total Total         [x]  Patient Education billed concurrently with other procedures   [x] Review HEP    [] Progressed/Changed HEP, detail:    [] Other detail:         Other Objective/Functional Measures      Pain Level at end of session (0-10 scale): 0/10      Assessment   Pt participated well with skilled PT services able to progress iso ER to bilateral resisted ER without pain. Pt did feel unsteady on feet with sit to stand transfers. Patient will continue to benefit from skilled PT / OT services to modify and progress therapeutic interventions, analyze and address functional mobility deficits, analyze and address ROM deficits, analyze and address strength deficits, analyze and address soft tissue restrictions, analyze and cue for proper movement patterns, analyze and modify for postural abnormalities, and analyze and address imbalance/dizziness to address functional deficits and attain remaining goals.     Progress toward goals / Updated goals:  []  See Progress Note/Recertification    NT      PLAN  Yes  Continue plan of care  Re-Cert Due: 6/27/28  []  Upgrade activities as tolerated  []  Discharge due to :  []  Other:      Markos , PT       12/6/2023       11:05 AM

## 2023-12-08 ENCOUNTER — HOSPITAL ENCOUNTER (OUTPATIENT)
Facility: HOSPITAL | Age: 78
Setting detail: RECURRING SERIES
Discharge: HOME | End: 2023-12-11
Payer: MEDICARE

## 2023-12-08 PROCEDURE — 97140 MANUAL THERAPY 1/> REGIONS: CPT | Performed by: PHYSICAL THERAPIST

## 2023-12-08 PROCEDURE — 97110 THERAPEUTIC EXERCISES: CPT | Performed by: PHYSICAL THERAPIST

## 2023-12-08 NOTE — PROGRESS NOTES
balance, and proprioception to improve patient's ability to progress to PLOF and address remaining functional goals. (see flow sheet as applicable)     Details if applicable:     41 23    Total Total         [x]  Patient Education billed concurrently with other procedures   [x] Review HEP    [] Progressed/Changed HEP, detail:    [] Other detail:         Other Objective/Functional Measures      Pain Level at end of session (0-10 scale): 0/10      Assessment   Pt continues to participated well with skilled PT services noting reduction of shoulder pain with There ex. Further education regarding posture was provided. Patient will continue to benefit from skilled PT / OT services to modify and progress therapeutic interventions, analyze and address functional mobility deficits, analyze and address ROM deficits, analyze and address strength deficits, analyze and address soft tissue restrictions, analyze and cue for proper movement patterns, analyze and modify for postural abnormalities, and analyze and address imbalance/dizziness to address functional deficits and attain remaining goals.     Progress toward goals / Updated goals:  []  See Progress Note/Recertification    NT      PLAN  Yes  Continue plan of care  Re-Cert Due: 0/55/24  []  Upgrade activities as tolerated  []  Discharge due to :  []  Other:      Che Portillo, PT       12/8/2023       11:02 AM

## 2023-12-13 ENCOUNTER — HOSPITAL ENCOUNTER (OUTPATIENT)
Facility: HOSPITAL | Age: 78
Setting detail: RECURRING SERIES
Discharge: HOME | End: 2023-12-16
Payer: MEDICARE

## 2023-12-13 PROCEDURE — 97110 THERAPEUTIC EXERCISES: CPT | Performed by: PHYSICAL THERAPIST

## 2023-12-13 PROCEDURE — 97140 MANUAL THERAPY 1/> REGIONS: CPT | Performed by: PHYSICAL THERAPIST

## 2023-12-13 NOTE — PROGRESS NOTES
PHYSICAL THERAPY - MEDICARE DAILY TREATMENT NOTE (updated 3/23)      Date: 2023          Patient Name:  Fabio Reynolds :  1945   Medical   Diagnosis:  Other symptoms and signs involving the musculoskeletal system [R29.898] Treatment Diagnosis:  M25.512  LEFT SHOULDER PAIN  and R26.81   Unsteadiness on feet    Referral Source:  Seng Vilchis MD Insurance:   Payor: Winston Federico / Plan: MEDICARE PART A AND B / Product Type: *No Product type* /                     Patient  verified yes     Visit #   Current  / Total 6 24   Time   In / Out 1100 am 1140 am   Total Treatment Time 40 min   Total Timed Codes 40 min   1:1 Treatment Time 40 min      [facility] Totals Reminder:  bill using total billable   min of TIMED therapeutic procedures and modalities. 8-22 min = 1 unit; 23-37 min = 2 units; 38-52 min = 3 units; 53-67 min = 4 units; 68-82 min = 5 units            SUBJECTIVE    Pain Level (0-10 scale): 0/10     Any medication changes, allergies to medications, adverse drug reactions, diagnosis change, or new procedure performed?: [x] No    [] Yes (see summary sheet for update)  Medications: Verified on Patient Summary List    Subjective functional status/changes:     Pt reports her shoulder is feeling pretty good, but her knees feel achy today. OBJECTIVE      Therapeutic Procedures: Tx Min Billable or 1:1 Min (if diff from Tx Min) Procedure, Rationale, Specifics   10  92691 Manual Therapy (timed):  decrease pain, increase ROM, and decrease trigger points to improve patient's ability to progress to PLOF and address remaining functional goals. The manual therapy interventions were performed at a separate and distinct time from the therapeutic activities interventions .  (see flow sheet as applicable)     Details if applicable:  posterior GH jt mobs, PROM all directions, [facility] R UT   30  29744 Therapeutic Exercise (timed):  increase ROM, strength, coordination, balance, and proprioception to improve patient's

## 2023-12-22 ENCOUNTER — APPOINTMENT (OUTPATIENT)
Facility: HOSPITAL | Age: 78
End: 2023-12-22
Payer: MEDICARE

## 2024-01-03 ENCOUNTER — HOSPITAL ENCOUNTER (OUTPATIENT)
Facility: HOSPITAL | Age: 79
Setting detail: RECURRING SERIES
Discharge: HOME | End: 2024-01-06
Payer: MEDICARE

## 2024-01-03 PROCEDURE — 97140 MANUAL THERAPY 1/> REGIONS: CPT | Performed by: PHYSICAL THERAPIST

## 2024-01-03 PROCEDURE — 97110 THERAPEUTIC EXERCISES: CPT | Performed by: PHYSICAL THERAPIST

## 2024-01-03 NOTE — PROGRESS NOTES
PHYSICAL THERAPY - MEDICARE DAILY TREATMENT NOTE (updated 3/23)      Date: 1/3/2024          Patient Name:  Yeni Vargas :  1945   Medical   Diagnosis:  Other symptoms and signs involving the musculoskeletal system [R29.898] Treatment Diagnosis:  M25.512  LEFT SHOULDER PAIN  and R26.81   Unsteadiness on feet    Referral Source:  Vandana Rodriguez MD Insurance:   Payor: MEDICARE / Plan: MEDICARE PART A AND B / Product Type: *No Product type* /                     Patient  verified yes     Visit #   Current  / Total 9 24   Time   In / Out 1028 am 1110 am   Total Treatment Time 42 min   Total Timed Codes 42 min   1:1 Treatment Time 42 min      [facility] Totals Reminder:  bill using total billable   min of TIMED therapeutic procedures and modalities.   8-22 min = 1 unit; 23-37 min = 2 units; 38-52 min = 3 units; 53-67 min = 4 units; 68-82 min = 5 units            SUBJECTIVE    Pain Level (0-10 scale): 3-4/10     Any medication changes, allergies to medications, adverse drug reactions, diagnosis change, or new procedure performed?: [x] No    [] Yes (see summary sheet for update)  Medications: Verified on Patient Summary List    Subjective functional status/changes:     Pt reports she had a busy Ember and New Years and has been having more shoulder pain the past few days.     OBJECTIVE      Therapeutic Procedures:  Tx Min Billable or 1:1 Min (if diff from Tx Min) Procedure, Rationale, Specifics   10  90756 Manual Therapy (timed):  decrease pain, increase ROM, and decrease trigger points to improve patient's ability to progress to PLOF and address remaining functional goals.  The manual therapy interventions were performed at a separate and distinct time from the therapeutic activities interventions . (see flow sheet as applicable)     Details if applicable:  posterior GH jt mobs, PROM all directions, STM R UT   32  60087 Therapeutic Exercise (timed):  increase ROM, strength, coordination, balance, and

## 2024-01-05 ENCOUNTER — HOSPITAL ENCOUNTER (OUTPATIENT)
Facility: HOSPITAL | Age: 79
Setting detail: RECURRING SERIES
Discharge: HOME | End: 2024-01-08
Payer: MEDICARE

## 2024-01-05 PROCEDURE — 97110 THERAPEUTIC EXERCISES: CPT | Performed by: PHYSICAL THERAPIST

## 2024-01-05 PROCEDURE — 97140 MANUAL THERAPY 1/> REGIONS: CPT | Performed by: PHYSICAL THERAPIST

## 2024-01-05 NOTE — PROGRESS NOTES
PHYSICAL THERAPY - MEDICARE DAILY TREATMENT NOTE (updated 3/23)      Date: 2024          Patient Name:  Yeni Vargas :  1945   Medical   Diagnosis:  Other symptoms and signs involving the musculoskeletal system [R29.898] Treatment Diagnosis:  M25.512  LEFT SHOULDER PAIN  and R26.81   Unsteadiness on feet    Referral Source:  Vandana Rodriguez MD Insurance:   Payor: MEDICARE / Plan: MEDICARE PART A AND B / Product Type: *No Product type* /                     Patient  verified yes     Visit #   Current  / Total 10 24   Time   In / Out 1132 am 1120 am   Total Treatment Time 48 min   Total Timed Codes 48 min   1:1 Treatment Time 38 min      [facility] Totals Reminder:  bill using total billable   min of TIMED therapeutic procedures and modalities.   8-22 min = 1 unit; 23-37 min = 2 units; 38-52 min = 3 units; 53-67 min = 4 units; 68-82 min = 5 units            SUBJECTIVE    Pain Level (0-10 scale): 0/10     Any medication changes, allergies to medications, adverse drug reactions, diagnosis change, or new procedure performed?: [x] No    [] Yes (see summary sheet for update)  Medications: Verified on Patient Summary List    Subjective functional status/changes:     Pt reports that her shoulder is feeling better today, but can get pain when at rest.      OBJECTIVE      Therapeutic Procedures:  Tx Min Billable or 1:1 Min (if diff from Tx Min) Procedure, Rationale, Specifics   10 10 46392 Manual Therapy (timed):  decrease pain, increase ROM, and decrease trigger points to improve patient's ability to progress to PLOF and address remaining functional goals.  The manual therapy interventions were performed at a separate and distinct time from the therapeutic activities interventions . (see flow sheet as applicable)     Details if applicable:  posterior GH jt mobs, PROM all directions, [facility] R UT   38 [phone] Therapeutic Exercise (timed):  increase ROM, strength, coordination, balance, and proprioception to improve

## 2024-01-07 PROCEDURE — 93298 REM INTERROG DEV EVAL SCRMS: CPT | Performed by: INTERNAL MEDICINE

## 2024-01-08 ENCOUNTER — PATIENT MESSAGE (OUTPATIENT)
Age: 79
End: 2024-01-08

## 2024-01-08 NOTE — TELEPHONE ENCOUNTER
VO per MD    Future Appointments   Date Time Provider Department Center   1/10/2024 11:00 AM Shamika Naik, PT [facility]   1/12/2024 11:00 AM Shamika Naik, PT [facility]   1/17/2024 11:00 AM Shamika Naik, PT [facility]   1/19/2024 11:00 AM Shamika Naik, PT [facility]   1/24/2024 11:00 AM Jazmyne Owens, PTA [facility]   1/26/2024 11:00 AM Shamika Naik, PT [facility]   1/31/2024 11:00 AM Shamika Naik, PT [facility]   2/2/2024 11:00 AM Shamika Naik, PT [facility]   3/15/2024  1:00 PM [facility]   6/25/2024  1:00 PM Vandana Rodriguez MD [facility] BS AMB   7/24/2024  9:40 AM Linda Sunshine, APRN - EVA PINEDA BS AMB   7/24/2024 10:00 AM PACEMAKER3, ELOISA PINEDA BS AMB   1/14/2025  9:40 AM PACEMAKER3, ELOISA PINEDA BS AMB   1/14/2025 10:00 AM Joanne Myers MD CAVREY BS AMB

## 2024-01-08 NOTE — TELEPHONE ENCOUNTER
From: Yeni Vargas  To: Dr. Joanne Myers  Sent: 1/8/2024 1:49 PM EST  Subject: Eliquis refill approval    [facility] has been trying unsuccessfully to get approval for a refill for Eliquis. Is there someone in the office who can take care of this? I have enough for two more days.    Thank you,  Yeni Vargas

## 2024-01-10 ENCOUNTER — HOSPITAL ENCOUNTER (OUTPATIENT)
Facility: HOSPITAL | Age: 79
Setting detail: RECURRING SERIES
Discharge: HOME | End: 2024-01-13
Payer: MEDICARE

## 2024-01-10 PROCEDURE — 97140 MANUAL THERAPY 1/> REGIONS: CPT | Performed by: PHYSICAL THERAPIST

## 2024-01-10 PROCEDURE — 97110 THERAPEUTIC EXERCISES: CPT | Performed by: PHYSICAL THERAPIST

## 2024-01-10 NOTE — PROGRESS NOTES
PHYSICAL THERAPY - MEDICARE DAILY TREATMENT NOTE (updated 3/23)      Date: 1/10/2024          Patient Name:  Yeni Vargas :  1945   Medical   Diagnosis:  Other symptoms and signs involving the musculoskeletal system [R29.898] Treatment Diagnosis:  M25.512  LEFT SHOULDER PAIN  and R26.81   Unsteadiness on feet    Referral Source:  Vandana Rodriguez MD Insurance:   Payor: MEDICARE / Plan: MEDICARE PART A AND B / Product Type: *No Product type* /                     Patient  verified yes     Visit #   Current  / Total 11 24   Time   In / Out 1100 am 1145 am   Total Treatment Time 45 min   Total Timed Codes 45 min   1:1 Treatment Time 45 min      [facility] Totals Reminder:  bill using total billable   min of TIMED therapeutic procedures and modalities.   8-22 min = 1 unit; 23-37 min = 2 units; 38-52 min = 3 units; 53-67 min = 4 units; 68-82 min = 5 units            SUBJECTIVE    Pain Level (0-10 scale): 1/10     Any medication changes, allergies to medications, adverse drug reactions, diagnosis change, or new procedure performed?: [x] No    [] Yes (see summary sheet for update)  Medications: Verified on Patient Summary List    Subjective functional status/changes:     Pt reports she reached out to the side over the weekend and had sharp pain.       OBJECTIVE      Therapeutic Procedures:  Tx Min Billable or 1:1 Min (if diff from Tx Min) Procedure, Rationale, Specifics   10  33984 Manual Therapy (timed):  decrease pain, increase ROM, and decrease trigger points to improve patient's ability to progress to PLOF and address remaining functional goals.  The manual therapy interventions were performed at a separate and distinct time from the therapeutic activities interventions . (see flow sheet as applicable)     Details if applicable:  posterior GH jt mobs, PROM all directions, [facility] R UT   35  96842 Therapeutic Exercise (timed):  increase ROM, strength, coordination, balance, and proprioception to improve patient's

## 2024-01-12 ENCOUNTER — APPOINTMENT (OUTPATIENT)
Facility: HOSPITAL | Age: 79
End: 2024-01-12
Payer: MEDICARE

## 2024-01-17 ENCOUNTER — HOSPITAL ENCOUNTER (OUTPATIENT)
Facility: HOSPITAL | Age: 79
Setting detail: RECURRING SERIES
Discharge: HOME | End: 2024-01-20
Payer: MEDICARE

## 2024-01-17 PROCEDURE — 97110 THERAPEUTIC EXERCISES: CPT

## 2024-01-17 PROCEDURE — 97140 MANUAL THERAPY 1/> REGIONS: CPT

## 2024-01-17 NOTE — PROGRESS NOTES
PHYSICAL THERAPY - MEDICARE DAILY TREATMENT NOTE (updated 3/23)      Date: 2024          Patient Name:  Yeni Vargas :  1945   Medical   Diagnosis:  Other symptoms and signs involving the musculoskeletal system [R29.898] Treatment Diagnosis:  M25.512  LEFT SHOULDER PAIN  and R26.81   Unsteadiness on feet    Referral Source:  Vandana Rodriguez MD Insurance:   Payor: MEDICARE / Plan: MEDICARE PART A AND B / Product Type: *No Product type* /                     Patient  verified yes     Visit #   Current  / Total 12 24   Time   In / Out 1105 am 1145 am   Total Treatment Time 40 min   Total Timed Codes 40 min   1:1 Treatment Time 40 min      [facility] Totals Reminder:  bill using total billable   min of TIMED therapeutic procedures and modalities.   8-22 min = 1 unit; 23-37 min = 2 units; 38-52 min = 3 units; 53-67 min = 4 units; 68-82 min = 5 units            SUBJECTIVE    Pain Level (0-10 scale): 0/10     Any medication changes, allergies to medications, adverse drug reactions, diagnosis change, or new procedure performed?: [x] No    [] Yes (see summary sheet for update)  Medications: Verified on Patient Summary List    Subjective functional status/changes:     Pt reports her shoulder ROM is doing much better, but still aches. Feels more woozy and unsteady today.    OBJECTIVE      Therapeutic Procedures:  Tx Min Billable or 1:1 Min (if diff from Tx Min) Procedure, Rationale, Specifics   10  40262 Manual Therapy (timed):  decrease pain, increase ROM, and decrease trigger points to improve patient's ability to progress to PLOF and address remaining functional goals.  The manual therapy interventions were performed at a separate and distinct time from the therapeutic activities interventions . (see flow sheet as applicable)     Details if applicable:  posterior GH jt mobs, PROM all directions, STM R UT   30  78201 Therapeutic Exercise (timed):  increase ROM, strength, coordination, balance, and

## 2024-01-19 ENCOUNTER — APPOINTMENT (OUTPATIENT)
Facility: HOSPITAL | Age: 79
End: 2024-01-19
Payer: MEDICARE

## 2024-01-24 ENCOUNTER — HOSPITAL ENCOUNTER (OUTPATIENT)
Facility: HOSPITAL | Age: 79
Setting detail: RECURRING SERIES
Discharge: HOME | End: 2024-01-27
Payer: MEDICARE

## 2024-01-24 PROCEDURE — 97140 MANUAL THERAPY 1/> REGIONS: CPT

## 2024-01-24 PROCEDURE — 97110 THERAPEUTIC EXERCISES: CPT

## 2024-01-24 NOTE — PROGRESS NOTES
2/20/24  []  Upgrade activities as tolerated  []  Discharge due to :  []  Other:      Jazmyne Owens, SALINA       1/24/2024       11:06 AM

## 2024-01-26 ENCOUNTER — PATIENT MESSAGE (OUTPATIENT)
Age: 79
End: 2024-01-26

## 2024-01-26 ENCOUNTER — HOSPITAL ENCOUNTER (OUTPATIENT)
Facility: HOSPITAL | Age: 79
Setting detail: RECURRING SERIES
Discharge: HOME | End: 2024-01-29
Payer: MEDICARE

## 2024-01-26 DIAGNOSIS — G89.29 CHRONIC SHOULDER PAIN, UNSPECIFIED LATERALITY: Primary | ICD-10-CM

## 2024-01-26 DIAGNOSIS — M25.519 CHRONIC SHOULDER PAIN, UNSPECIFIED LATERALITY: Primary | ICD-10-CM

## 2024-01-26 PROCEDURE — 97140 MANUAL THERAPY 1/> REGIONS: CPT | Performed by: PHYSICAL THERAPIST

## 2024-01-26 PROCEDURE — 97110 THERAPEUTIC EXERCISES: CPT | Performed by: PHYSICAL THERAPIST

## 2024-01-26 NOTE — PROGRESS NOTES
Physical Therapy at [facility],   a part of [facility]  [address] [address]  Phone: [phone]  Fax: [phone]     PHYSICAL THERAPY PROGRESS NOTE  Patient Name:  Yeni Vargas :  1945   Treatment/Medical Diagnosis: Other symptoms and signs involving the musculoskeletal system [R29.898]   Referral Source:  Vandana Rodriguez MD     Date of Initial Visit:  23 Attended Visits:  14 Missed Visits:  0     SUMMARY OF TREATMENT/ASSESSMENT:  Pt has attended 14 skilled PT sessions addressing L shoulder strength and ROM deficits and balance and gait deficits. She was making significant improvements with L shoulder pain, strength, and AROM, but has had increased pain this week. Pain was improved with scapular repositioning and winging of L scapula noted. She has made significant improvements with balance, but remains fearful with curbs and uneven ground without assistance of rail. Demonstrates improvement with hip strength and 5x sit to stand score. Pt has met 2/2 STG and 2/6 LTG making progress towards remaining goals.     Patient will continue to benefit from skilled PT / OT services to modify and progress therapeutic interventions, analyze and address functional mobility deficits, analyze and address ROM deficits, analyze and address strength deficits, analyze and address soft tissue restrictions, analyze and cue for proper movement patterns, analyze and modify for postural abnormalities, and analyze and address imbalance/dizziness to address functional deficits and attain remaining goals.    CURRENT STATUS      L Shoulder ROM:      NT today     UPPER QUARTER                             MUSCLE STRENGTH  KEY                                                                             R                      L  0 - No Contraction                               Flexion             5                      4+  1 - Trace

## 2024-01-26 NOTE — PROGRESS NOTES
PHYSICAL THERAPY - MEDICARE DAILY TREATMENT NOTE (updated 3/23)      Date: 2024          Patient Name:  Yeni Vargas :  1945   Medical   Diagnosis:  Other symptoms and signs involving the musculoskeletal system [R29.898] Treatment Diagnosis:  M25.512  LEFT SHOULDER PAIN  and R26.81   Unsteadiness on feet    Referral Source:  Vandana Rodriguez MD Insurance:   Payor: MEDICARE / Plan: MEDICARE PART A AND B / Product Type: *No Product type* /                     Patient  verified yes     Visit #   Current  / Total 14 24   Time   In / Out 1100 am 1154 am   Total Treatment Time 54 min   Total Timed Codes 49 min   1:1 Treatment Time 49 min      [facility] Totals Reminder:  bill using total billable   min of TIMED therapeutic procedures and modalities.   8-22 min = 1 unit; 23-37 min = 2 units; 38-52 min = 3 units; 53-67 min = 4 units; 68-82 min = 5 units            SUBJECTIVE    Pain Level (0-10 scale): 4/10     Any medication changes, allergies to medications, adverse drug reactions, diagnosis change, or new procedure performed?: [x] No    [] Yes (see summary sheet for update)  Medications: Verified on Patient Summary List    Subjective functional status/changes:     Pt reports that since  she has been having more L shoulder pain which had been feeling so much better. States she plans on contacting her doctor due to increased pain. Notes that she feels her balance is a little improved, but will never be comfortable with marching and tandem walking without UE assist. She is using SPC in community which makes her feels more comfortable.     OBJECTIVE      Therapeutic Procedures:  Tx Min Billable or 1:1 Min (if diff from Tx Min) Procedure, Rationale, Specifics   10  88421 Manual Therapy (timed):  decrease pain, increase ROM, and decrease trigger points to improve patient's ability to progress to PLOF and address remaining functional goals.  The manual therapy interventions were performed at a separate and

## 2024-01-31 ENCOUNTER — HOSPITAL ENCOUNTER (OUTPATIENT)
Facility: HOSPITAL | Age: 79
Setting detail: RECURRING SERIES
Discharge: HOME | End: 2024-02-03
Payer: MEDICARE

## 2024-01-31 PROCEDURE — 97110 THERAPEUTIC EXERCISES: CPT | Performed by: PHYSICAL THERAPIST

## 2024-01-31 PROCEDURE — 97140 MANUAL THERAPY 1/> REGIONS: CPT | Performed by: PHYSICAL THERAPIST

## 2024-01-31 NOTE — PROGRESS NOTES
PHYSICAL THERAPY - MEDICARE DAILY TREATMENT NOTE (updated 3/23)      Date: 2024          Patient Name:  Yeni Vargas :  1945   Medical   Diagnosis:  Other symptoms and signs involving the musculoskeletal system [R29.898] Treatment Diagnosis:  M25.512  LEFT SHOULDER PAIN  and R26.81   Unsteadiness on feet    Referral Source:  Vandana Rodriguez MD Insurance:   Payor: MEDICARE / Plan: MEDICARE PART A AND B / Product Type: *No Product type* /                     Patient  verified yes     Visit #   Current  / Total 15 24   Time   In / Out 1100 am 1146 am   Total Treatment Time 46 min   Total Timed Codes 46 min   1:1 Treatment Time 46 min      [facility] Totals Reminder:  bill using total billable   min of TIMED therapeutic procedures and modalities.   8-22 min = 1 unit; 23-37 min = 2 units; 38-52 min = 3 units; 53-67 min = 4 units; 68-82 min = 5 units            SUBJECTIVE    Pain Level (0-10 scale): 1/10     Any medication changes, allergies to medications, adverse drug reactions, diagnosis change, or new procedure performed?: [x] No    [] Yes (see summary sheet for update)  Medications: Verified on Patient Summary List    Subjective functional status/changes:     Pt reports that her shoulder feels better than it did earlier in the week, but continues to have sharp pain with movement and achiness at rest.     OBJECTIVE      Therapeutic Procedures:  Tx Min Billable or 1:1 Min (if diff from Tx Min) Procedure, Rationale, Specifics   10  11594 Manual Therapy (timed):  decrease pain, increase ROM, and decrease trigger points to improve patient's ability to progress to PLOF and address remaining functional goals.  The manual therapy interventions were performed at a separate and distinct time from the therapeutic activities interventions . (see flow sheet as applicable)     Details if applicable:  posterior GH jt mobs, PROM all directions, STM L UT   36  28799 Therapeutic Exercise (timed):  increase ROM,

## 2024-02-02 ENCOUNTER — HOSPITAL ENCOUNTER (OUTPATIENT)
Facility: HOSPITAL | Age: 79
Setting detail: RECURRING SERIES
Discharge: HOME | End: 2024-02-05
Payer: MEDICARE

## 2024-02-02 PROCEDURE — 97140 MANUAL THERAPY 1/> REGIONS: CPT | Performed by: PHYSICAL THERAPIST

## 2024-02-02 PROCEDURE — 97110 THERAPEUTIC EXERCISES: CPT | Performed by: PHYSICAL THERAPIST

## 2024-02-02 NOTE — PROGRESS NOTES
PHYSICAL THERAPY - MEDICARE DAILY TREATMENT NOTE (updated 3/23)      Date: 2024          Patient Name:  Yeni Vargas :  1945   Medical   Diagnosis:  Other symptoms and signs involving the musculoskeletal system [R29.898] Treatment Diagnosis:  M25.512  LEFT SHOULDER PAIN  and R26.81   Unsteadiness on feet    Referral Source:  Vandana Rodriguez MD Insurance:   Payor: MEDICARE / Plan: MEDICARE PART A AND B / Product Type: *No Product type* /                     Patient  verified yes     Visit #   Current  / Total 16 24   Time   In / Out 1104 am 1151 am   Total Treatment Time 47 min   Total Timed Codes 47 min   1:1 Treatment Time 30 min      [facility] Totals Reminder:  bill using total billable   min of TIMED therapeutic procedures and modalities.   8-22 min = 1 unit; 23-37 min = 2 units; 38-52 min = 3 units; 53-67 min = 4 units; 68-82 min = 5 units            SUBJECTIVE    Pain Level (0-10 scale): 1/10     Any medication changes, allergies to medications, adverse drug reactions, diagnosis change, or new procedure performed?: [x] No    [] Yes (see summary sheet for update)  Medications: Verified on Patient Summary List    Subjective functional status/changes:     Pt reports that her shoulder feels better than it did earlier in the week, but continues to have sharp pain with movement and achiness at rest.     OBJECTIVE      Therapeutic Procedures:  Tx Min Billable or 1:1 Min (if diff from Tx Min) Procedure, Rationale, Specifics   10 10 04442 Manual Therapy (timed):  decrease pain, increase ROM, and decrease trigger points to improve patient's ability to progress to PLOF and address remaining functional goals.  The manual therapy interventions were performed at a separate and distinct time from the therapeutic activities interventions . (see flow sheet as applicable)     Details if applicable:  posterior GH jt mobs, PROM all directions, [facility] L UT   37 20 99636 Therapeutic Exercise (timed):  increase ROM,

## 2024-02-06 NOTE — TELEPHONE ENCOUNTER
Latricia Lee 1/26/2024 1:10 PM EST      ----- Message -----  From: Yeni Vargas  Sent: 1/26/2024 1:03 PM EST  To: Tyler Barton  Clinical Staff  Subject: Physical therapy update and question     Today was my 13th PT session. I had been making good progress with my shoulder problem, and arm movement was definitely better. For some reason, the pain/ache returned a few days ago, and I am once again having difficulty raising my arm. If you agree, I think it’s time to see a specialist. If yes, should I continue with physical therapy or wait until after I see a specialist?    Lower body strength has definitely improved!    Thank you,  Yeni

## 2024-03-13 DIAGNOSIS — M81.0 AGE-RELATED OSTEOPOROSIS WITHOUT CURRENT PATHOLOGICAL FRACTURE: Primary | ICD-10-CM

## 2024-03-13 RX ORDER — DIPHENHYDRAMINE HYDROCHLORIDE 50 MG/ML
50 INJECTION INTRAMUSCULAR; INTRAVENOUS
Status: CANCELLED | OUTPATIENT
Start: 2024-03-15

## 2024-03-13 RX ORDER — ACETAMINOPHEN 325 MG/1
650 TABLET ORAL
Status: CANCELLED | OUTPATIENT
Start: 2024-03-15

## 2024-03-13 RX ORDER — EPINEPHRINE 1 MG/ML
0.3 INJECTION, SOLUTION, CONCENTRATE INTRAVENOUS PRN
Status: CANCELLED | OUTPATIENT
Start: 2024-03-15

## 2024-03-13 RX ORDER — SODIUM CHLORIDE 9 MG/ML
5-250 INJECTION, SOLUTION INTRAVENOUS PRN
Status: CANCELLED | OUTPATIENT
Start: 2024-03-15

## 2024-03-13 RX ORDER — ONDANSETRON 2 MG/ML
8 INJECTION INTRAMUSCULAR; INTRAVENOUS
Status: CANCELLED | OUTPATIENT
Start: 2024-03-15

## 2024-03-13 RX ORDER — SODIUM CHLORIDE 0.9 % (FLUSH) 0.9 %
5-40 SYRINGE (ML) INJECTION PRN
Status: CANCELLED | OUTPATIENT
Start: 2024-03-15

## 2024-03-13 RX ORDER — SODIUM CHLORIDE 9 MG/ML
INJECTION, SOLUTION INTRAVENOUS CONTINUOUS
Status: CANCELLED | OUTPATIENT
Start: 2024-03-15

## 2024-03-13 RX ORDER — ALBUTEROL SULFATE 90 UG/1
4 AEROSOL, METERED RESPIRATORY (INHALATION) PRN
Status: CANCELLED | OUTPATIENT
Start: 2024-03-15

## 2024-03-13 RX ORDER — HEPARIN SODIUM (PORCINE) LOCK FLUSH IV SOLN 100 UNIT/ML 100 UNIT/ML
500 SOLUTION INTRAVENOUS PRN
Status: CANCELLED | OUTPATIENT
Start: 2024-03-15

## 2024-03-13 RX ORDER — FAMOTIDINE 10 MG/ML
20 INJECTION, SOLUTION INTRAVENOUS
Status: CANCELLED | OUTPATIENT
Start: 2024-03-15

## 2024-03-13 RX ORDER — ZOLEDRONIC ACID 5 MG/100ML
5 INJECTION, SOLUTION INTRAVENOUS ONCE
Status: CANCELLED | OUTPATIENT
Start: 2024-03-15 | End: 2024-03-15

## 2024-03-15 ENCOUNTER — HOSPITAL ENCOUNTER (OUTPATIENT)
Facility: HOSPITAL | Age: 79
Setting detail: INFUSION SERIES
Discharge: HOME | End: 2024-03-15
Payer: MEDICARE

## 2024-03-15 ENCOUNTER — APPOINTMENT (OUTPATIENT)
Dept: INFUSION THERAPY | Age: 79
End: 2024-03-15

## 2024-03-15 VITALS
SYSTOLIC BLOOD PRESSURE: 122 MMHG | DIASTOLIC BLOOD PRESSURE: 64 MMHG | TEMPERATURE: 98.5 F | WEIGHT: 132 LBS | BODY MASS INDEX: 20.67 KG/M2 | HEART RATE: 72 BPM

## 2024-03-15 DIAGNOSIS — M81.0 AGE-RELATED OSTEOPOROSIS WITHOUT CURRENT PATHOLOGICAL FRACTURE: Primary | ICD-10-CM

## 2024-03-15 LAB
ANION GAP BLD CALC-SCNC: 6.3 MMOL/L (ref 10–20)
CA-I BLD-MCNC: 1.25 MMOL/L (ref 1.12–1.32)
CHLORIDE BLD-SCNC: 102 MMOL/L (ref 98–107)
CO2 BLD-SCNC: 28.7 MMOL/L (ref 21–32)
CREAT BLD-MCNC: 0.57 MG/DL (ref 0.6–1.3)
GLUCOSE BLD-MCNC: 148 MG/DL (ref 65–100)
POTASSIUM BLD-SCNC: 4.5 MMOL/L (ref 3.5–5.1)
SERVICE CMNT-IMP: ABNORMAL
SODIUM BLD-SCNC: 137 MMOL/L (ref 136–145)

## 2024-03-15 PROCEDURE — 80047 BASIC METABLC PNL IONIZED CA: CPT

## 2024-03-15 PROCEDURE — 96365 THER/PROPH/DIAG IV INF INIT: CPT

## 2024-03-15 PROCEDURE — 6360000002 HC RX W HCPCS: Performed by: INTERNAL MEDICINE

## 2024-03-15 PROCEDURE — 2580000003 HC RX 258: Performed by: INTERNAL MEDICINE

## 2024-03-15 RX ORDER — ALBUTEROL SULFATE 90 UG/1
4 AEROSOL, METERED RESPIRATORY (INHALATION) PRN
OUTPATIENT
Start: 2025-03-14

## 2024-03-15 RX ORDER — EPINEPHRINE 1 MG/ML
0.3 INJECTION, SOLUTION INTRAMUSCULAR; SUBCUTANEOUS PRN
OUTPATIENT
Start: 2025-03-14

## 2024-03-15 RX ORDER — DIPHENHYDRAMINE HYDROCHLORIDE 50 MG/ML
50 INJECTION INTRAMUSCULAR; INTRAVENOUS
OUTPATIENT
Start: 2025-03-14

## 2024-03-15 RX ORDER — SODIUM CHLORIDE 9 MG/ML
5-250 INJECTION, SOLUTION INTRAVENOUS PRN
OUTPATIENT
Start: 2025-03-14

## 2024-03-15 RX ORDER — SODIUM CHLORIDE 9 MG/ML
5-250 INJECTION, SOLUTION INTRAVENOUS PRN
Status: DISCONTINUED | OUTPATIENT
Start: 2024-03-15 | End: 2024-03-16 | Stop reason: HOSPADM

## 2024-03-15 RX ORDER — ACETAMINOPHEN 325 MG/1
650 TABLET ORAL
OUTPATIENT
Start: 2025-03-14

## 2024-03-15 RX ORDER — ZOLEDRONIC ACID 5 MG/100ML
5 INJECTION, SOLUTION INTRAVENOUS ONCE
OUTPATIENT
Start: 2025-03-14 | End: 2025-03-14

## 2024-03-15 RX ORDER — ZOLEDRONIC ACID 5 MG/100ML
5 INJECTION, SOLUTION INTRAVENOUS ONCE
Status: COMPLETED | OUTPATIENT
Start: 2024-03-15 | End: 2024-03-15

## 2024-03-15 RX ORDER — ONDANSETRON 2 MG/ML
8 INJECTION INTRAMUSCULAR; INTRAVENOUS
OUTPATIENT
Start: 2025-03-14

## 2024-03-15 RX ORDER — SODIUM CHLORIDE 0.9 % (FLUSH) 0.9 %
5-40 SYRINGE (ML) INJECTION PRN
OUTPATIENT
Start: 2025-03-14

## 2024-03-15 RX ORDER — SODIUM CHLORIDE 9 MG/ML
INJECTION, SOLUTION INTRAVENOUS CONTINUOUS
OUTPATIENT
Start: 2025-03-14

## 2024-03-15 RX ORDER — HEPARIN 100 UNIT/ML
500 SYRINGE INTRAVENOUS PRN
OUTPATIENT
Start: 2025-03-14

## 2024-03-15 RX ADMIN — ZOLEDRONIC ACID 5 MG: 5 INJECTION, SOLUTION INTRAVENOUS at 13:34

## 2024-03-15 RX ADMIN — SODIUM CHLORIDE 25 ML/HR: 9 INJECTION, SOLUTION INTRAVENOUS at 13:32

## 2024-03-15 NOTE — PROGRESS NOTES
[facility] Short Note                       Date: March 15, 2024    Name: Yeni Vargas    MRN: [id number]         : 1945      1300 Pt admit to [facility] for Reclast ambulatory in stable condition. Assessment completed. No new concerns voiced. PIV placed in left AC.   Please review pending lab results in CC.      Ms. Vargas's vitals were reviewed prior to and after treatment.   Patient Vitals for the past 12 hrs:   Temp Pulse BP   03/15/24 1300 98.5 °F (36.9 °C) 72 122/64         Lab results were obtained and reviewed.  Recent Results (from the past 12 hour(s))   POC CHEM 8    Collection Time: 03/15/24  1:11 PM   Result Value Ref Range    POC Ionized Calcium 1.25 1.12 - 1.32 mmol/L    POC Sodium 137 136 - 145 mmol/L    POC Potassium 4.5 3.5 - 5.1 mmol/L    POC Chloride 102 98 - 107 mmol/L    POC TCO2 28.7 21 - 32 mmol/L    Anion Gap, POC 6.3 (L) 10 - 20 mmol/L    POC Glucose 148 (H) 65 - 100 mg/dL    POC Creatinine 0.57 (L) 0.6 - 1.3 mg/dL    eGFR, POC >60 >60 ml/min/1.73m2    UA Comment Comment Not Indicated.         Medications given:   Medications Administered         0.9 % sodium chloride infusion Admin Date  03/15/2024 Action  New Bag Dose  25 mL/hr Rate  25 mL/hr Route  IntraVENous Administered By  Nayana Tristan, RN        zoledronic acid (RECLAST) 5 mg/100 mL infusion Admin Date  03/15/2024 Action  New Bag Dose  5 mg Rate  400 mL/hr Route  IntraVENous Administered By  Nayana Tristan, RN              Ms. Vargas tolerated the infusion, and had no complaints.    [facility].     Future Appointments   Date Time Provider Department Center   2024  1:00 PM Vandana Rodriguez MD [facility] AMB   2024  9:40 AM Linda Sunshine, APRN - [facility] AMB   2024 10:00 AM PACEMAKER3, [facility] AMB   2025  9:40 AM PACEMAKER3, [facility] AMB   2025 10:00 AM Joanne Myers MD [facility] AMB       Nayana Tristan RN  March 15,  2024  2:30 PM

## 2024-03-18 DIAGNOSIS — E78.00 HYPERCHOLESTEROLEMIA: ICD-10-CM

## 2024-03-18 RX ORDER — ROSUVASTATIN CALCIUM 10 MG/1
TABLET, COATED ORAL
Qty: 90 TABLET | Refills: 1 | Status: SHIPPED | OUTPATIENT
Start: 2024-03-18

## 2024-06-22 SDOH — HEALTH STABILITY: PHYSICAL HEALTH: ON AVERAGE, HOW MANY DAYS PER WEEK DO YOU ENGAGE IN MODERATE TO STRENUOUS EXERCISE (LIKE A BRISK WALK)?: 6 DAYS

## 2024-06-22 SDOH — ECONOMIC STABILITY: INCOME INSECURITY: HOW HARD IS IT FOR YOU TO PAY FOR THE VERY BASICS LIKE FOOD, HOUSING, MEDICAL CARE, AND HEATING?: NOT HARD AT ALL

## 2024-06-22 SDOH — ECONOMIC STABILITY: FOOD INSECURITY: WITHIN THE PAST 12 MONTHS, THE FOOD YOU BOUGHT JUST DIDN'T LAST AND YOU DIDN'T HAVE MONEY TO GET MORE.: NEVER TRUE

## 2024-06-22 SDOH — ECONOMIC STABILITY: FOOD INSECURITY: WITHIN THE PAST 12 MONTHS, YOU WORRIED THAT YOUR FOOD WOULD RUN OUT BEFORE YOU GOT MONEY TO BUY MORE.: NEVER TRUE

## 2024-06-22 SDOH — HEALTH STABILITY: PHYSICAL HEALTH: ON AVERAGE, HOW MANY MINUTES DO YOU ENGAGE IN EXERCISE AT THIS LEVEL?: 10 MIN

## 2024-06-22 ASSESSMENT — PATIENT HEALTH QUESTIONNAIRE - PHQ9
2. FEELING DOWN, DEPRESSED OR HOPELESS: NOT AT ALL
SUM OF ALL RESPONSES TO PHQ9 QUESTIONS 1 & 2: 0
SUM OF ALL RESPONSES TO PHQ QUESTIONS 1-9: 0
1. LITTLE INTEREST OR PLEASURE IN DOING THINGS: NOT AT ALL
SUM OF ALL RESPONSES TO PHQ QUESTIONS 1-9: 0

## 2024-06-22 ASSESSMENT — LIFESTYLE VARIABLES
HOW MANY STANDARD DRINKS CONTAINING ALCOHOL DO YOU HAVE ON A TYPICAL DAY: PATIENT DOES NOT DRINK
HOW OFTEN DO YOU HAVE A DRINK CONTAINING ALCOHOL: 1
HOW MANY STANDARD DRINKS CONTAINING ALCOHOL DO YOU HAVE ON A TYPICAL DAY: 0
HOW OFTEN DO YOU HAVE SIX OR MORE DRINKS ON ONE OCCASION: 1
HOW OFTEN DO YOU HAVE A DRINK CONTAINING ALCOHOL: NEVER

## 2024-06-25 ENCOUNTER — OFFICE VISIT (OUTPATIENT)
Age: 79
End: 2024-06-25
Payer: MEDICARE

## 2024-06-25 VITALS
WEIGHT: 130.6 LBS | DIASTOLIC BLOOD PRESSURE: 63 MMHG | BODY MASS INDEX: 20.5 KG/M2 | RESPIRATION RATE: 16 BRPM | HEART RATE: 90 BPM | TEMPERATURE: 98.4 F | OXYGEN SATURATION: 97 % | HEIGHT: 67 IN | SYSTOLIC BLOOD PRESSURE: 133 MMHG

## 2024-06-25 DIAGNOSIS — I48.0 AF (PAROXYSMAL ATRIAL FIBRILLATION) (HCC): ICD-10-CM

## 2024-06-25 DIAGNOSIS — E78.00 HYPERCHOLESTEROLEMIA: ICD-10-CM

## 2024-06-25 DIAGNOSIS — Z00.00 MEDICARE ANNUAL WELLNESS VISIT, SUBSEQUENT: Primary | ICD-10-CM

## 2024-06-25 DIAGNOSIS — R29.898 WEAKNESS OF BOTH LOWER EXTREMITIES: ICD-10-CM

## 2024-06-25 DIAGNOSIS — M81.0 AGE-RELATED OSTEOPOROSIS WITHOUT CURRENT PATHOLOGICAL FRACTURE: ICD-10-CM

## 2024-06-25 DIAGNOSIS — R55 VASOVAGAL SYNCOPE: ICD-10-CM

## 2024-06-25 DIAGNOSIS — Z86.73 HISTORY OF CVA (CEREBROVASCULAR ACCIDENT): ICD-10-CM

## 2024-06-25 DIAGNOSIS — E03.9 HYPOTHYROIDISM, UNSPECIFIED TYPE: ICD-10-CM

## 2024-06-25 DIAGNOSIS — I47.10 SVT (SUPRAVENTRICULAR TACHYCARDIA) (HCC): ICD-10-CM

## 2024-06-25 PROCEDURE — 1090F PRES/ABSN URINE INCON ASSESS: CPT | Performed by: INTERNAL MEDICINE

## 2024-06-25 PROCEDURE — 1123F ACP DISCUSS/DSCN MKR DOCD: CPT | Performed by: INTERNAL MEDICINE

## 2024-06-25 PROCEDURE — G8420 CALC BMI NORM PARAMETERS: HCPCS | Performed by: INTERNAL MEDICINE

## 2024-06-25 PROCEDURE — G8427 DOCREV CUR MEDS BY ELIG CLIN: HCPCS | Performed by: INTERNAL MEDICINE

## 2024-06-25 PROCEDURE — G8399 PT W/DXA RESULTS DOCUMENT: HCPCS | Performed by: INTERNAL MEDICINE

## 2024-06-25 PROCEDURE — 99214 OFFICE O/P EST MOD 30 MIN: CPT | Performed by: INTERNAL MEDICINE

## 2024-06-25 PROCEDURE — G0439 PPPS, SUBSEQ VISIT: HCPCS | Performed by: INTERNAL MEDICINE

## 2024-06-25 PROCEDURE — 1036F TOBACCO NON-USER: CPT | Performed by: INTERNAL MEDICINE

## 2024-06-25 ASSESSMENT — ENCOUNTER SYMPTOMS
ABDOMINAL PAIN: 0
SORE THROAT: 0
DIARRHEA: 0
SHORTNESS OF BREATH: 0
CONSTIPATION: 0
BLOOD IN STOOL: 0
COUGH: 0
NAUSEA: 0
BACK PAIN: 0

## 2024-06-25 NOTE — PROGRESS NOTES
today:     Patient Active Problem List   Diagnosis    History of CVA (cerebrovascular accident)    Intracranial atherosclerosis    Hypothyroidism    Hypercholesterolemia    Gastroesophageal reflux disease without esophagitis    SVT (supraventricular tachycardia) (HCC)    Vasovagal syncope    Age-related osteoporosis without current pathological fracture    History of loop recorder   Taking medication with no side effects.   Exercise: walking in place and leg lifts. Diet healthy diet   -120's/60-70's  Has seen Dr calvin for left rotator cuff tear.   She reports having more problems with balance.  Has not had any falls and is using a cane.  She is followed by cardiology for SVT and she reports has about 1 episode of A-fib a year.  She is on Eliquis for management of this.  Patient's complete Health Risk Assessment and screening values have been reviewed and are found in Flowsheets. The following problems were reviewed today and where indicated follow up appointments were made and/or referrals ordered.    Positive Risk Factor Screenings with Interventions:    Fall Risk:  Do you feel unsteady or are you worried about falling? : (!) yes  2 or more falls in past year?: no  Fall with injury in past year?: no       Interventions:    Reviewed medications, home hazards, visual acuity, and co-morbidities that can increase risk for falls  Refer to PT                         Review of Systems   Constitutional:  Negative for fever.   HENT:  Negative for congestion and sore throat.    Respiratory:  Negative for cough and shortness of breath.    Cardiovascular:  Negative for chest pain and leg swelling.   Gastrointestinal:  Negative for abdominal pain, blood in stool, constipation, diarrhea and nausea.   Genitourinary:  Negative for dysuria, frequency and urgency.   Musculoskeletal:  Negative for back pain and joint swelling.   Skin:  Negative for rash.   Neurological:  Negative for light-headedness and headaches.

## 2024-06-25 NOTE — ASSESSMENT & PLAN NOTE
She just had reclast and will repeat bone density in 1/2025.   Continue calcium, vitamin D and weightbearing exercise for bone health.  Reviewed last bone density.

## 2024-06-26 DIAGNOSIS — E78.00 HYPERCHOLESTEROLEMIA: ICD-10-CM

## 2024-06-26 DIAGNOSIS — E03.9 HYPOTHYROIDISM, UNSPECIFIED TYPE: ICD-10-CM

## 2024-06-26 DIAGNOSIS — M81.0 AGE-RELATED OSTEOPOROSIS WITHOUT CURRENT PATHOLOGICAL FRACTURE: ICD-10-CM

## 2024-06-27 LAB
25(OH)D3 SERPL-MCNC: 37.2 NG/ML (ref 30–100)
ALBUMIN SERPL-MCNC: 4.1 G/DL (ref 3.5–5)
ALBUMIN/GLOB SERPL: 1.3 (ref 1.1–2.2)
ALP SERPL-CCNC: 78 U/L (ref 45–117)
ALT SERPL-CCNC: 25 U/L (ref 12–78)
ANION GAP SERPL CALC-SCNC: 4 MMOL/L (ref 5–15)
AST SERPL-CCNC: 24 U/L (ref 15–37)
BASOPHILS # BLD: 0 K/UL (ref 0–0.1)
BASOPHILS NFR BLD: 1 % (ref 0–1)
BILIRUB SERPL-MCNC: 0.5 MG/DL (ref 0.2–1)
BUN SERPL-MCNC: 8 MG/DL (ref 6–20)
BUN/CREAT SERPL: 10 (ref 12–20)
CALCIUM SERPL-MCNC: 9.7 MG/DL (ref 8.5–10.1)
CHLORIDE SERPL-SCNC: 104 MMOL/L (ref 97–108)
CHOLEST SERPL-MCNC: 143 MG/DL
CO2 SERPL-SCNC: 29 MMOL/L (ref 21–32)
CREAT SERPL-MCNC: 0.79 MG/DL (ref 0.55–1.02)
DIFFERENTIAL METHOD BLD: NORMAL
EOSINOPHIL # BLD: 0 K/UL (ref 0–0.4)
EOSINOPHIL NFR BLD: 1 % (ref 0–7)
ERYTHROCYTE [DISTWIDTH] IN BLOOD BY AUTOMATED COUNT: 13.9 % (ref 11.5–14.5)
GLOBULIN SER CALC-MCNC: 3.1 G/DL (ref 2–4)
GLUCOSE SERPL-MCNC: 103 MG/DL (ref 65–100)
HCT VFR BLD AUTO: 43.3 % (ref 35–47)
HDLC SERPL-MCNC: 70 MG/DL
HDLC SERPL: 2 (ref 0–5)
HGB BLD-MCNC: 13.6 G/DL (ref 11.5–16)
IMM GRANULOCYTES # BLD AUTO: 0 K/UL (ref 0–0.04)
IMM GRANULOCYTES NFR BLD AUTO: 0 % (ref 0–0.5)
LDLC SERPL CALC-MCNC: 55 MG/DL (ref 0–100)
LYMPHOCYTES # BLD: 1.2 K/UL (ref 0.8–3.5)
LYMPHOCYTES NFR BLD: 22 % (ref 12–49)
MCH RBC QN AUTO: 29.3 PG (ref 26–34)
MCHC RBC AUTO-ENTMCNC: 31.4 G/DL (ref 30–36.5)
MCV RBC AUTO: 93.3 FL (ref 80–99)
MONOCYTES # BLD: 0.4 K/UL (ref 0–1)
MONOCYTES NFR BLD: 8 % (ref 5–13)
NEUTS SEG # BLD: 3.7 K/UL (ref 1.8–8)
NEUTS SEG NFR BLD: 68 % (ref 32–75)
NRBC # BLD: 0 K/UL (ref 0–0.01)
NRBC BLD-RTO: 0 PER 100 WBC
PLATELET # BLD AUTO: 297 K/UL (ref 150–400)
PMV BLD AUTO: 10.8 FL (ref 8.9–12.9)
POTASSIUM SERPL-SCNC: 4.7 MMOL/L (ref 3.5–5.1)
PROT SERPL-MCNC: 7.2 G/DL (ref 6.4–8.2)
RBC # BLD AUTO: 4.64 M/UL (ref 3.8–5.2)
SODIUM SERPL-SCNC: 137 MMOL/L (ref 136–145)
T4 FREE SERPL-MCNC: 1.5 NG/DL (ref 0.8–1.5)
TRIGL SERPL-MCNC: 90 MG/DL
TSH SERPL DL<=0.05 MIU/L-ACNC: 16 UIU/ML (ref 0.36–3.74)
VLDLC SERPL CALC-MCNC: 18 MG/DL
WBC # BLD AUTO: 5.3 K/UL (ref 3.6–11)

## 2024-06-30 DIAGNOSIS — E78.00 HYPERCHOLESTEROLEMIA: ICD-10-CM

## 2024-06-30 RX ORDER — ROSUVASTATIN CALCIUM 10 MG/1
10 TABLET, COATED ORAL NIGHTLY
Qty: 90 TABLET | Refills: 3 | Status: SHIPPED | OUTPATIENT
Start: 2024-06-30

## 2024-07-01 RX ORDER — LEVOTHYROXINE SODIUM 112 UG/1
112 TABLET ORAL
Qty: 90 TABLET | Refills: 0 | Status: SHIPPED | OUTPATIENT
Start: 2024-07-01

## 2024-07-01 NOTE — TELEPHONE ENCOUNTER
Requested Prescriptions     Signed Prescriptions Disp Refills    apixaban (ELIQUIS) 2.5 MG TABS tablet 180 tablet 1     Sig: Take 1 tablet by mouth 2 times daily     Authorizing Provider: JOANNE MYERS     Ordering User: ARACELI NEVAREZ     Refill per verbal order Dr. Joanne Myers.   Last visit:7/6/23  Next visit: 7/24/24

## 2024-07-20 NOTE — PROGRESS NOTES
angiography of the head and neck was performed. Delayed axial  images through the head were also obtained. Coronal and sagittal reconstructions  were obtained. Manual postprocessing of images was performed. 3-D  Sagittal  maximal intensity projection images were obtained.  3-D Coronal maximal  intensity projections were obtained.  CT dose reduction was achieved through use  of a standardized protocol tailored for this examination and automatic exposure  control for dose modulation. CT perfusion analysis was performed using CT with  contrast administration, including postprocessing of parametric maps with the  determination of cerebral blood flow, cerebral blood volume, and mean transit  time.    FINDINGS:    Delayed contrast-enhanced head CT:    The ventricles are midline without hydrocephalus.  There is no acute intra or  extra-axial hemorrhage. Mild bilateral subcortical and periventricular areas of  patchy low attenuation is nonspecific but likely related to changes of chronic  small vessel ischemic disease. The basal cisterns are clear.  The paranasal  sinuses are clear.    CTA NECK:    Great vessels: Patent    Right subclavian artery: Patent    Left subclavian artery: Patent    Right common carotid artery: Patent    Left common carotid artery: Patent    Cervical right internal carotid artery: Patent with mild proximal  atherosclerosis causing less than 50% stenosis by NASCET criteria.    Cervical left internal carotid artery: Patent with mild proximal atherosclerosis  causing less than 50% stenosis by NASCET criteria.    Right vertebral artery: Patent    Left vertebral artery: Patent    Mild to moderate cervical spondylosis. Unchanged bilateral upper lobe lung  opacities with are chronic and likely scarring    CTA HEAD:    Right cavernous internal carotid artery: Patent with mild atherosclerosis    Left cavernous internal carotid artery: Patent with mild atherosclerosis    Anterior cerebral arteries: Patent

## 2024-07-24 ENCOUNTER — OFFICE VISIT (OUTPATIENT)
Age: 79
End: 2024-07-24
Payer: MEDICARE

## 2024-07-24 VITALS
BODY MASS INDEX: 20.25 KG/M2 | WEIGHT: 129 LBS | DIASTOLIC BLOOD PRESSURE: 66 MMHG | SYSTOLIC BLOOD PRESSURE: 138 MMHG | HEART RATE: 83 BPM | OXYGEN SATURATION: 97 % | HEIGHT: 67 IN

## 2024-07-24 DIAGNOSIS — Z95.818 IMPLANTABLE LOOP RECORDER PRESENT: Primary | ICD-10-CM

## 2024-07-24 DIAGNOSIS — I49.3 PVC (PREMATURE VENTRICULAR CONTRACTION): ICD-10-CM

## 2024-07-24 DIAGNOSIS — Z79.01 ANTICOAGULATED: ICD-10-CM

## 2024-07-24 DIAGNOSIS — R06.02 SHORTNESS OF BREATH: ICD-10-CM

## 2024-07-24 DIAGNOSIS — R55 VASOVAGAL SYNCOPE: ICD-10-CM

## 2024-07-24 DIAGNOSIS — I47.10 PSVT (PAROXYSMAL SUPRAVENTRICULAR TACHYCARDIA) (HCC): ICD-10-CM

## 2024-07-24 PROCEDURE — 99214 OFFICE O/P EST MOD 30 MIN: CPT | Performed by: NURSE PRACTITIONER

## 2024-07-24 PROCEDURE — 1036F TOBACCO NON-USER: CPT | Performed by: NURSE PRACTITIONER

## 2024-07-24 PROCEDURE — G8420 CALC BMI NORM PARAMETERS: HCPCS | Performed by: NURSE PRACTITIONER

## 2024-07-24 PROCEDURE — G8427 DOCREV CUR MEDS BY ELIG CLIN: HCPCS | Performed by: NURSE PRACTITIONER

## 2024-07-24 PROCEDURE — 1123F ACP DISCUSS/DSCN MKR DOCD: CPT | Performed by: NURSE PRACTITIONER

## 2024-07-24 PROCEDURE — 1090F PRES/ABSN URINE INCON ASSESS: CPT | Performed by: NURSE PRACTITIONER

## 2024-07-24 PROCEDURE — G8399 PT W/DXA RESULTS DOCUMENT: HCPCS | Performed by: NURSE PRACTITIONER

## 2024-08-15 ENCOUNTER — HOSPITAL ENCOUNTER (OUTPATIENT)
Facility: HOSPITAL | Age: 79
Discharge: HOME | End: 2024-08-15
Payer: MEDICARE

## 2024-08-15 VITALS — BODY MASS INDEX: 20.25 KG/M2 | WEIGHT: 129 LBS | HEIGHT: 67 IN

## 2024-08-15 DIAGNOSIS — Z12.31 VISIT FOR SCREENING MAMMOGRAM: ICD-10-CM

## 2024-08-15 PROCEDURE — 77063 BREAST TOMOSYNTHESIS BI: CPT

## 2024-09-20 ENCOUNTER — ANCILLARY PROCEDURE (OUTPATIENT)
Age: 79
End: 2024-09-20
Payer: MEDICARE

## 2024-09-20 VITALS
SYSTOLIC BLOOD PRESSURE: 144 MMHG | HEIGHT: 67 IN | DIASTOLIC BLOOD PRESSURE: 84 MMHG | BODY MASS INDEX: 20.25 KG/M2 | HEART RATE: 83 BPM | WEIGHT: 129 LBS

## 2024-09-20 DIAGNOSIS — R06.02 SHORTNESS OF BREATH: ICD-10-CM

## 2024-09-20 DIAGNOSIS — R55 VASOVAGAL SYNCOPE: ICD-10-CM

## 2024-09-20 DIAGNOSIS — Z79.01 ANTICOAGULATED: ICD-10-CM

## 2024-09-20 DIAGNOSIS — Z95.818 IMPLANTABLE LOOP RECORDER PRESENT: ICD-10-CM

## 2024-09-20 DIAGNOSIS — I47.10 PSVT (PAROXYSMAL SUPRAVENTRICULAR TACHYCARDIA) (HCC): ICD-10-CM

## 2024-09-20 DIAGNOSIS — I49.3 PVC (PREMATURE VENTRICULAR CONTRACTION): ICD-10-CM

## 2024-09-20 LAB
ECHO AO ASC DIAM: 3 CM
ECHO AO ASCENDING AORTA INDEX: 1.79 CM/M2
ECHO AO ROOT DIAM: 3.2 CM
ECHO AO ROOT INDEX: 1.9 CM/M2
ECHO AV AREA PEAK VELOCITY: 2 CM2
ECHO AV AREA VTI: 1.9 CM2
ECHO AV AREA/BSA PEAK VELOCITY: 1.2 CM2/M2
ECHO AV AREA/BSA VTI: 1.1 CM2/M2
ECHO AV MEAN GRADIENT: 5 MMHG
ECHO AV MEAN VELOCITY: 1 M/S
ECHO AV PEAK GRADIENT: 8 MMHG
ECHO AV PEAK VELOCITY: 1.4 M/S
ECHO AV VELOCITY RATIO: 0.64
ECHO AV VTI: 32.5 CM
ECHO BSA: 1.66 M2
ECHO EST RA PRESSURE: 3 MMHG
ECHO LA DIAMETER INDEX: 1.61 CM/M2
ECHO LA DIAMETER: 2.7 CM
ECHO LA TO AORTIC ROOT RATIO: 0.84
ECHO LA VOL A-L A2C: 40 ML (ref 22–52)
ECHO LA VOL A-L A4C: 47 ML (ref 22–52)
ECHO LA VOL BP: 42 ML (ref 22–52)
ECHO LA VOL MOD A2C: 38 ML (ref 22–52)
ECHO LA VOL MOD A4C: 45 ML (ref 22–52)
ECHO LA VOL/BSA BIPLANE: 25 ML/M2 (ref 16–34)
ECHO LA VOLUME AREA LENGTH: 45 ML
ECHO LA VOLUME INDEX A-L A2C: 24 ML/M2 (ref 16–34)
ECHO LA VOLUME INDEX A-L A4C: 28 ML/M2 (ref 16–34)
ECHO LA VOLUME INDEX AREA LENGTH: 27 ML/M2 (ref 16–34)
ECHO LA VOLUME INDEX MOD A2C: 23 ML/M2 (ref 16–34)
ECHO LA VOLUME INDEX MOD A4C: 27 ML/M2 (ref 16–34)
ECHO LV E' LATERAL VELOCITY: 10.1 CM/S
ECHO LV E' SEPTAL VELOCITY: 9.5 CM/S
ECHO LV EDV A2C: 60 ML
ECHO LV EDV A4C: 65 ML
ECHO LV EDV BP: 63 ML (ref 56–104)
ECHO LV EDV INDEX A4C: 39 ML/M2
ECHO LV EDV INDEX BP: 38 ML/M2
ECHO LV EDV NDEX A2C: 36 ML/M2
ECHO LV EJECTION FRACTION A2C: 62 %
ECHO LV EJECTION FRACTION A4C: 61 %
ECHO LV EJECTION FRACTION BIPLANE: 61 % (ref 55–100)
ECHO LV ESV A2C: 23 ML
ECHO LV ESV A4C: 25 ML
ECHO LV ESV BP: 24 ML (ref 19–49)
ECHO LV ESV INDEX A2C: 14 ML/M2
ECHO LV ESV INDEX A4C: 15 ML/M2
ECHO LV ESV INDEX BP: 14 ML/M2
ECHO LV FRACTIONAL SHORTENING: 30 % (ref 28–44)
ECHO LV INTERNAL DIMENSION DIASTOLE INDEX: 2.56 CM/M2
ECHO LV INTERNAL DIMENSION DIASTOLIC: 4.3 CM (ref 3.9–5.3)
ECHO LV INTERNAL DIMENSION SYSTOLIC INDEX: 1.79 CM/M2
ECHO LV INTERNAL DIMENSION SYSTOLIC: 3 CM
ECHO LV IVSD: 0.7 CM (ref 0.6–0.9)
ECHO LV MASS 2D: 88.5 G (ref 67–162)
ECHO LV MASS INDEX 2D: 52.7 G/M2 (ref 43–95)
ECHO LV POSTERIOR WALL DIASTOLIC: 0.7 CM (ref 0.6–0.9)
ECHO LV RELATIVE WALL THICKNESS RATIO: 0.33
ECHO LVOT AREA: 3.1 CM2
ECHO LVOT AV VTI INDEX: 0.6
ECHO LVOT DIAM: 2 CM
ECHO LVOT MEAN GRADIENT: 2 MMHG
ECHO LVOT PEAK GRADIENT: 3 MMHG
ECHO LVOT PEAK VELOCITY: 0.9 M/S
ECHO LVOT STROKE VOLUME INDEX: 36.6 ML/M2
ECHO LVOT SV: 61.5 ML
ECHO LVOT VTI: 19.6 CM
ECHO MV A VELOCITY: 1.11 M/S
ECHO MV E DECELERATION TIME (DT): 170.5 MS
ECHO MV E VELOCITY: 0.8 M/S
ECHO MV E/A RATIO: 0.72
ECHO MV E/E' LATERAL: 7.92
ECHO MV E/E' RATIO (AVERAGED): 8.17
ECHO MV E/E' SEPTAL: 8.42
ECHO PV MAX VELOCITY: 1.1 M/S
ECHO PV PEAK GRADIENT: 5 MMHG
ECHO RIGHT VENTRICULAR SYSTOLIC PRESSURE (RVSP): 22 MMHG
ECHO RV BASAL DIMENSION: 3.3 CM
ECHO RV TAPSE: 2.4 CM (ref 1.7–?)
ECHO RVOT PEAK GRADIENT: 3 MMHG
ECHO RVOT PEAK VELOCITY: 0.8 M/S
ECHO TV REGURGITANT MAX VELOCITY: 2.16 M/S
ECHO TV REGURGITANT PEAK GRADIENT: 19 MMHG

## 2024-09-20 PROCEDURE — 93306 TTE W/DOPPLER COMPLETE: CPT | Performed by: SPECIALIST

## 2024-09-23 ENCOUNTER — TELEPHONE (OUTPATIENT)
Age: 79
End: 2024-09-23

## 2024-09-23 ENCOUNTER — PATIENT MESSAGE (OUTPATIENT)
Age: 79
End: 2024-09-23

## 2024-09-23 DIAGNOSIS — E03.9 HYPOTHYROIDISM, UNSPECIFIED TYPE: Primary | ICD-10-CM

## 2024-09-25 DIAGNOSIS — E03.9 HYPOTHYROIDISM, UNSPECIFIED TYPE: ICD-10-CM

## 2024-09-25 LAB
T4 FREE SERPL-MCNC: 1.4 NG/DL (ref 0.8–1.5)
TSH SERPL DL<=0.05 MIU/L-ACNC: 9.22 UIU/ML (ref 0.36–3.74)

## 2024-09-27 DIAGNOSIS — E03.9 HYPOTHYROIDISM, UNSPECIFIED TYPE: Primary | ICD-10-CM

## 2024-09-27 RX ORDER — LEVOTHYROXINE SODIUM 125 UG/1
125 TABLET ORAL
Qty: 90 TABLET | Refills: 0 | Status: SHIPPED | OUTPATIENT
Start: 2024-09-27

## 2024-12-11 ENCOUNTER — PATIENT MESSAGE (OUTPATIENT)
Age: 79
End: 2024-12-11

## 2024-12-11 DIAGNOSIS — K21.9 GASTROESOPHAGEAL REFLUX DISEASE WITHOUT ESOPHAGITIS: Primary | ICD-10-CM

## 2024-12-17 NOTE — TELEPHONE ENCOUNTER
VO per MD    Future Appointments   Date Time Provider Department Center   1/14/2025 10:00 AM Joanne Myers MD [facility]   7/3/2025 10:00 AM [facility]

## 2024-12-18 DIAGNOSIS — E03.9 HYPOTHYROIDISM, UNSPECIFIED TYPE: ICD-10-CM

## 2024-12-18 LAB
T4 FREE SERPL-MCNC: 1.6 NG/DL (ref 0.8–1.5)
TSH SERPL DL<=0.05 MIU/L-ACNC: 4.16 UIU/ML (ref 0.36–3.74)

## 2024-12-20 DIAGNOSIS — E03.9 HYPOTHYROIDISM, UNSPECIFIED TYPE: Primary | ICD-10-CM

## 2024-12-20 RX ORDER — LEVOTHYROXINE SODIUM 137 UG/1
137 TABLET ORAL DAILY
Qty: 90 TABLET | Refills: 0 | Status: SHIPPED | OUTPATIENT
Start: 2024-12-20

## 2025-01-14 ENCOUNTER — OFFICE VISIT (OUTPATIENT)
Age: 80
End: 2025-01-14
Payer: MEDICARE

## 2025-01-14 VITALS
OXYGEN SATURATION: 100 % | RESPIRATION RATE: 16 BRPM | DIASTOLIC BLOOD PRESSURE: 82 MMHG | WEIGHT: 128 LBS | BODY MASS INDEX: 20.09 KG/M2 | HEART RATE: 80 BPM | SYSTOLIC BLOOD PRESSURE: 142 MMHG | HEIGHT: 67 IN

## 2025-01-14 DIAGNOSIS — Z98.890 HISTORY OF LOOP RECORDER: ICD-10-CM

## 2025-01-14 DIAGNOSIS — Z86.73 HISTORY OF CVA (CEREBROVASCULAR ACCIDENT): ICD-10-CM

## 2025-01-14 DIAGNOSIS — I47.10 SVT (SUPRAVENTRICULAR TACHYCARDIA) (HCC): Primary | ICD-10-CM

## 2025-01-14 DIAGNOSIS — E03.9 HYPOTHYROIDISM, UNSPECIFIED TYPE: ICD-10-CM

## 2025-01-14 DIAGNOSIS — I48.0 AF (PAROXYSMAL ATRIAL FIBRILLATION) (HCC): ICD-10-CM

## 2025-01-14 DIAGNOSIS — R55 VASOVAGAL SYNCOPE: ICD-10-CM

## 2025-01-14 PROCEDURE — 93005 ELECTROCARDIOGRAM TRACING: CPT | Performed by: INTERNAL MEDICINE

## 2025-01-14 PROCEDURE — 99214 OFFICE O/P EST MOD 30 MIN: CPT | Performed by: INTERNAL MEDICINE

## 2025-01-14 ASSESSMENT — PATIENT HEALTH QUESTIONNAIRE - PHQ9
SUM OF ALL RESPONSES TO PHQ9 QUESTIONS 1 & 2: 0
2. FEELING DOWN, DEPRESSED OR HOPELESS: NOT AT ALL
SUM OF ALL RESPONSES TO PHQ QUESTIONS 1-9: 0
1. LITTLE INTEREST OR PLEASURE IN DOING THINGS: NOT AT ALL
SUM OF ALL RESPONSES TO PHQ QUESTIONS 1-9: 0

## 2025-01-14 NOTE — PROGRESS NOTES
Cardiac Electrophysiology Office Follow-up    NAME: Yeni Vargas   :  1945  MRM:  [id number]    Date:  2025         Assessment and Plan:     1. SVT (supraventricular tachycardia) (HCC)  -     EKG 12 Lead  2. AF (paroxysmal atrial fibrillation) (HCC)  -     EKG 12 Lead  3. Hypothyroidism, unspecified type  -     EKG 12 Lead  4. History of CVA (cerebrovascular accident)  -     EKG 12 Lead  5. History of loop recorder  -     EKG 12 Lead  6. Vasovagal syncope  -     EKG 12 Lead           [employer] ILR (DOI 2022):   -Implanted after AF & transient AFL were noted during EPS with unclear clinical significance.  -Device check on 2024 showed no sustained arrhythmia or bradycardia events.  PVC burden 7.9%.  -Continue monthly remote transmissions.    PVCs:  -Echo in 2024 showed LVEF 60-65% with MR.  -[place] 7.9% per ILR check in 2024.  -ECG today shows NSR 80 bpm with PVC, PAC.  -Continue to monitor via ILR.    Vasovagal syncope:   -Unclear if that was related to symptomatic PSVT alone.  -Symptoms more predominant during summer months.  -Previously trialed midodrine 2.5 mg po tid.  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  -Advised aggressive hydration, especially during exercise, illness, or in hot environment.  -Recommend assuming supine position in the setting of onset of prodrome symptoms.  -Consider compression stockings to promote venous blood return.     PSVT:  -Historically associated with palpitations, dizziness, & near syncope.  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  Spontaneous AF & transient AFL noted during EPS, but were of unclear clinical significance.  -No known recurrence.  -No longer on anything for rate/rhythm control.  -Continue to monitor via ILR.     Anticoagulation:  -Continue Eliquis for thromboembolic prophylaxis.  -Denies bleeding issues.  -Knows to contact clinic for BRBPR, melena, hematuria, or hemoptysis.

## 2025-02-11 ENCOUNTER — CLINICAL DOCUMENTATION (OUTPATIENT)
Age: 80
End: 2025-02-11

## 2025-02-11 NOTE — PROGRESS NOTES
Received fax from [facility] for Eliquis recommendation for endo procedure.  \"Per Savanah Mac, NP hold Eliquis 2 days prior & 0 days after.  This is per [facility] request not ours.\"  Pt is not being treated for any comorbidities or cardiac conditions that would prevent them from being sedated by Propofol in a free standing endo center.  Faxed to [facility] fax# [phone].  Received fax confirmation.

## 2025-02-18 ENCOUNTER — ANESTHESIA EVENT (OUTPATIENT)
Facility: HOSPITAL | Age: 80
End: 2025-02-18
Payer: MEDICARE

## 2025-02-18 ENCOUNTER — ANESTHESIA (OUTPATIENT)
Facility: HOSPITAL | Age: 80
End: 2025-02-18
Payer: MEDICARE

## 2025-02-18 ENCOUNTER — HOSPITAL ENCOUNTER (OUTPATIENT)
Facility: HOSPITAL | Age: 80
Setting detail: OUTPATIENT SURGERY
Discharge: HOME | End: 2025-02-18
Attending: SPECIALIST | Admitting: SPECIALIST
Payer: MEDICARE

## 2025-02-18 VITALS
HEART RATE: 72 BPM | BODY MASS INDEX: 19.25 KG/M2 | RESPIRATION RATE: 20 BRPM | HEIGHT: 68 IN | SYSTOLIC BLOOD PRESSURE: 134 MMHG | DIASTOLIC BLOOD PRESSURE: 47 MMHG | TEMPERATURE: 97.9 F | WEIGHT: 127 LBS | OXYGEN SATURATION: 97 %

## 2025-02-18 PROCEDURE — 2720000010 HC SURG SUPPLY STERILE: Performed by: SPECIALIST

## 2025-02-18 PROCEDURE — 3700000000 HC ANESTHESIA ATTENDED CARE: Performed by: SPECIALIST

## 2025-02-18 PROCEDURE — 6360000002 HC RX W HCPCS: Performed by: NURSE ANESTHETIST, CERTIFIED REGISTERED

## 2025-02-18 PROCEDURE — 88305 TISSUE EXAM BY PATHOLOGIST: CPT

## 2025-02-18 PROCEDURE — 3600007512: Performed by: SPECIALIST

## 2025-02-18 PROCEDURE — 3600007502: Performed by: SPECIALIST

## 2025-02-18 PROCEDURE — 2709999900 HC NON-CHARGEABLE SUPPLY: Performed by: SPECIALIST

## 2025-02-18 PROCEDURE — 7100000010 HC PHASE II RECOVERY - FIRST 15 MIN: Performed by: SPECIALIST

## 2025-02-18 PROCEDURE — 2580000003 HC RX 258: Performed by: NURSE ANESTHETIST, CERTIFIED REGISTERED

## 2025-02-18 PROCEDURE — 3700000001 HC ADD 15 MINUTES (ANESTHESIA): Performed by: SPECIALIST

## 2025-02-18 PROCEDURE — C1769 GUIDE WIRE: HCPCS | Performed by: SPECIALIST

## 2025-02-18 PROCEDURE — 7100000011 HC PHASE II RECOVERY - ADDTL 15 MIN: Performed by: SPECIALIST

## 2025-02-18 RX ORDER — FAMOTIDINE 40 MG/1
40 TABLET, FILM COATED ORAL
COMMUNITY
Start: 2025-01-17

## 2025-02-18 RX ORDER — SODIUM CHLORIDE 0.9 % (FLUSH) 0.9 %
5-40 SYRINGE (ML) INJECTION PRN
Status: CANCELLED | OUTPATIENT
Start: 2025-02-18

## 2025-02-18 RX ORDER — SODIUM CHLORIDE 9 MG/ML
INJECTION, SOLUTION INTRAVENOUS
Status: DISCONTINUED | OUTPATIENT
Start: 2025-02-18 | End: 2025-02-18 | Stop reason: SDUPTHER

## 2025-02-18 RX ORDER — SODIUM CHLORIDE 9 MG/ML
INJECTION, SOLUTION INTRAVENOUS CONTINUOUS
Status: CANCELLED | OUTPATIENT
Start: 2025-02-18

## 2025-02-18 RX ORDER — ZOLEDRONIC ACID 0.05 MG/ML
5 INJECTION, SOLUTION INTRAVENOUS ONCE
COMMUNITY

## 2025-02-18 RX ORDER — LIDOCAINE HYDROCHLORIDE 20 MG/ML
INJECTION, SOLUTION EPIDURAL; INFILTRATION; INTRACAUDAL; PERINEURAL
Status: DISCONTINUED | OUTPATIENT
Start: 2025-02-18 | End: 2025-02-18 | Stop reason: SDUPTHER

## 2025-02-18 RX ORDER — SODIUM CHLORIDE 9 MG/ML
INJECTION, SOLUTION INTRAVENOUS PRN
Status: CANCELLED | OUTPATIENT
Start: 2025-02-18

## 2025-02-18 RX ORDER — SODIUM CHLORIDE 0.9 % (FLUSH) 0.9 %
5-40 SYRINGE (ML) INJECTION EVERY 12 HOURS SCHEDULED
Status: CANCELLED | OUTPATIENT
Start: 2025-02-18

## 2025-02-18 RX ADMIN — SODIUM CHLORIDE: 9 INJECTION, SOLUTION INTRAVENOUS at 15:49

## 2025-02-18 RX ADMIN — PROPOFOL 50 MG: 10 INJECTION, EMULSION INTRAVENOUS at 16:02

## 2025-02-18 RX ADMIN — PROPOFOL 50 MG: 10 INJECTION, EMULSION INTRAVENOUS at 15:56

## 2025-02-18 RX ADMIN — PROPOFOL 100 MG: 10 INJECTION, EMULSION INTRAVENOUS at 15:51

## 2025-02-18 RX ADMIN — LIDOCAINE HYDROCHLORIDE 50 MG: 20 INJECTION, SOLUTION EPIDURAL; INFILTRATION; INTRACAUDAL; PERINEURAL at 15:51

## 2025-02-18 ASSESSMENT — PAIN - FUNCTIONAL ASSESSMENT: PAIN_FUNCTIONAL_ASSESSMENT: NONE - DENIES PAIN

## 2025-02-18 ASSESSMENT — PAIN SCALES - GENERAL
PAINLEVEL_OUTOF10: 0

## 2025-02-18 NOTE — ANESTHESIA PRE PROCEDURE
Department of Anesthesiology  Preprocedure Note       Name:  Yeni Vargas   Age:  79 y.o.  :  1945                                          MRN:  [id number]         Date:  2025      Surgeon: Surgeon(s):  Laci Shultz MD    Procedure: Procedure(s):  ESOPHAGOGASTRODUODENOSCOPY    Medications prior to admission:   Prior to Admission medications    Medication Sig Start Date End Date Taking? Authorizing Provider   levothyroxine (SYNTHROID) 137 MCG tablet Take 1 tablet by mouth daily 24   Vandana Rodriguez MD   apixaban (ELIQUIS) 2.5 MG TABS tablet Take 1 tablet by mouth 2 times daily 24   Joanne Myers MD   rosuvastatin (CRESTOR) 10 MG tablet Take 1 tablet by mouth nightly 24   Vandana Rodriguez MD   Calcium Carb-Cholecalciferol (CALCIUM CARBONATE-VITAMIN D3 PO) Take 1 tablet by mouth 2 times daily 2/12/10   ProviderPilo MD   Multiple Vitamins-Minerals (THERAPEUTIC MULTIVITAMIN-MINERALS) tablet Take 1 tablet by mouth daily    ProviderPilo MD   famotidine (PEPCID) 20 MG tablet Take by mouth daily as needed    Automatic Reconciliation, Ar       Current medications:    No current facility-administered medications for this encounter.       Allergies:    Allergies   Allergen Reactions   • Indomethacin Shortness Of Breath and Swelling     Ankles & legs   • Alendronate Other (See Comments)     Indigestion   • Clopidogrel Diarrhea     severe   • Ibandronic Acid Other (See Comments)     indigestion       Problem List:    Patient Active Problem List   Diagnosis Code   • History of CVA (cerebrovascular accident) Z86.73   • Intracranial atherosclerosis I67.2   • Hypothyroidism E03.9   • Hypercholesterolemia E78.00   • Gastroesophageal reflux disease without esophagitis K21.9   • SVT (supraventricular tachycardia) ([facility]) I47.10   • Vasovagal syncope R55   • Age-related osteoporosis without current pathological fracture M81.0   • History of loop recorder Z98.890   • AF (paroxysmal atrial

## 2025-02-18 NOTE — PROGRESS NOTES
Initial RN admission and assessment performed and documented in Endoscopy navigator.     Patient evaluated by anesthesia in pre-procedure holding.     All procedural vital signs, airway assessment, and level of consciousness information monitored and recorded by anesthesia staff on the anesthesia record.     Report received from CRNA post procedure.  Patient transported to recovery area by RN.    Endoscopy post procedure time out was performed and specimen jars x2  were verified with physician.    Esophageal dilation by MD with 48f savory. No heme noted, no crepitus.    Endoscope was pre-cleaned at bedside immediately following procedure by JAZMIN Strickland.

## 2025-02-18 NOTE — ANESTHESIA POSTPROCEDURE EVALUATION
Post-Anesthesia Evaluation and Assessment    Patient: Yeni Vargas MRN: [id number]  SSN: [id number]    YOB: 1945  Age: 79 y.o.  Sex: female      I have evaluated the patient and they are stable and ready for discharge from the PACU.     Cardiovascular Function/Vital Signs  Visit Vitals  BP (!) 134/47   Pulse 72   Temp 97.9 °F (36.6 °C)   Resp 20   Ht 1.727 m (5' 8\")   Wt 57.6 kg (127 lb)   SpO2 97%   BMI 19.31 kg/m²       Patient is status post Monitor Anesthesia Care anesthesia for Procedure(s):  ESOPHAGOGASTRODUODENOSCOPY.    Nausea/Vomiting: None    Postoperative hydration reviewed and adequate.    Pain:  Managed    Neurological Status:   At baseline    Mental Status, Level of Consciousness: Alert and  oriented to person, place, and time    Pulmonary Status:   Adequate oxygenation and airway patent    Complications related to anesthesia: None    Post-anesthesia assessment completed. No concerns    Signed By: Brian Rubalcava MD     February 18, 2025

## 2025-02-18 NOTE — OP NOTE
[place] [address]                 NAME:  Yeni Vargas   :   1945   MRN:   [id number]     Date/Time:  2025 4:09 PM    Esophagogastroduodenoscopy (EGD) Procedure Note    :  Laci Shultz MD    Staff: Circulator: Alicia Mcqueen RN  Endoscopy Technician: Juanis Strickland     Referring Provider:  Vandana Rodriguez MD    Anethesia/Sedation:  MAC anesthesia Propofol    Preoperative diagnosis: Gastroesophageal reflux disease, unspecified whether esophagitis present [K21.9]  Hypothyroidism, unspecified type [E03.9], dysphagia      Procedure Details     After infom consent was obtained for the procedure, with all risks and benefits of procedure explained the patient was taken to the endoscopy suite and placed in the left lateral decubitus position.  Following sequential administration of sedation as per above, the [id number] gastroscope was inserted into the mouth and advanced under direct vision to second portion of the duodenum.  A careful inspection was made as the gastroscope was withdrawn, including a retroflexed view of the proximal stomach; findings and interventions are described below.      Findings:  Esophagus:Normal mucosa, random biopsies done. Esophagus dilated with 45 F wire guided Savary dilator.  Stomach:Patchy antral erythema, biopsies done  Duodenum/jejunum:normal      Therapies:  As above    Specimens:  ID Type Source Tests Collected by Time Destination   1 : gastric, antrum bx Tissue Gastric SURGICAL PATHOLOGY Laci Shultz MD 2025 [id number]    2 : random esophageal bx Tissue Esophagus SURGICAL PATHOLOGY Laci Shultz MD 2025 1603               EBL: None    Complications:   None; patient tolerated the procedure well.           Impression:    See Postoperative diagnosis above    Recommendations:  -Continue acid suppression., -Await pathology.    Discharge disposition:  Home in the company of  when able to

## 2025-02-18 NOTE — DISCHARGE INSTRUCTIONS
Yeni Vargas  [id number]  1945    EGD DISCHARGE INSTRUCTIONS  Discomfort:  Sore throat- throat lozenges or warm salt water gargle  redness at IV site- apply warm compress to area; if redness or soreness persist- contact your physician  Gaseous discomfort- walking, belching will help relieve any discomfort    DIET  You may resume your regular diet - however -  remember your colon is empty and a heavy meal will produce gas.   Avoid these foods:  vegetables, fried / greasy foods, carbonated drinks  You may not drink alcoholic beverages for at least 12 hours    MEDICATIONS   Regarding Aspirin or Nonsteroidal medications specifically, please see below.    ACTIVITY  You may resume your normal daily activities.   Spend the remainder of the day resting -  avoid any strenuous activity.  You may not operate a vehicle for 12 hours  You may not engage in an occupation involving machinery or appliances for rest of today.  Avoid making any critical decisions for at least 24 hour    CALL M.D.  ANY SIGN OF   Increasing pain, nausea, vomiting  Abdominal distension (swelling)  New increased bleeding (oral or rectal)  Fever (chills)  Pain in chest area  Bloody discharge from nose or mouth  Shortness of breath    You may  take any Advil, Aspirin, Ibuprofen, Motrin, Aleve, or Goody’s for 10 days, ONLY  Tylenol as needed for pain.    Post procedure diagnosis: rule out gastritis, EoE  Post-procedure recommendations: await biopsy results, continue pepcid    Follow-up Instructions:   Call Dr. Shultz  Results of procedure / biopsy in 10 days  Telephone #  [phone]        DISCHARGE SUMMARY from Nurse    The following personal items collected during your admission are returned to you:   Dental Appliance:    Vision:    Hearing Aid:    Jewelry:    Clothing:    Other Valuables:    Valuables sent to safe:

## 2025-02-18 NOTE — H&P
Pre-endoscopy H and P     The patient was seen and examined in the endoscopy suite. The airway was assessed and docuemented. The problem list and medications were reviewed.     Patient Active Problem List   Diagnosis    History of CVA (cerebrovascular accident)    Intracranial atherosclerosis    Hypothyroidism    Hypercholesterolemia    Gastroesophageal reflux disease without esophagitis    SVT (supraventricular tachycardia) ([facility])    Vasovagal syncope    Age-related osteoporosis without current pathological fracture    History of loop recorder    AF (paroxysmal atrial fibrillation) ([facility])     Social History     Socioeconomic History    Marital status:      Spouse name: Not on file    Number of children: Not on file    Years of education: Not on file    Highest education level: Not on file   Occupational History    Not on file   Tobacco Use    Smoking status: Former     Current packs/day: 0.00     Average packs/day: 2.0 packs/day for 8.0 years (16.0 ttl pk-yrs)     Types: Cigarettes     Start date: 1952     Quit date: 1960     Years since quittin.1     Passive exposure: Never    Smokeless tobacco: Never   Substance and Sexual Activity    Alcohol use: No    Drug use: No    Sexual activity: Not on file   Other Topics Concern    Not on file   Social History Narrative    Not on file     Social Determinants of Health     Financial Resource Strain: Low Risk  (2024)    Overall Financial Resource Strain (CARDIA)     Difficulty of Paying Living Expenses: Not hard at all   Food Insecurity: No Food Insecurity (2024)    Hunger Vital Sign     Worried About Running Out of Food in the Last Year: Never true     Ran Out of Food in the Last Year: Never true   Transportation Needs: Unknown (2024)    PRAPARE - Transportation     Lack of Transportation (Medical): Not on file     Lack of Transportation (Non-Medical): No   Physical Activity: Insufficiently Active (2024)    Exercise Vital Sign     Days

## 2025-03-17 DIAGNOSIS — E03.9 HYPOTHYROIDISM, UNSPECIFIED TYPE: ICD-10-CM

## 2025-03-18 ENCOUNTER — RESULTS FOLLOW-UP (OUTPATIENT)
Age: 80
End: 2025-03-18

## 2025-03-18 DIAGNOSIS — E03.9 HYPOTHYROIDISM, UNSPECIFIED TYPE: Primary | ICD-10-CM

## 2025-03-18 LAB
T4 FREE SERPL-MCNC: 1.7 NG/DL (ref 0.8–1.5)
TSH SERPL DL<=0.05 MIU/L-ACNC: 0.11 UIU/ML (ref 0.36–3.74)

## 2025-03-18 RX ORDER — LEVOTHYROXINE SODIUM 125 UG/1
TABLET ORAL
Qty: 60 TABLET | Refills: 0 | Status: SHIPPED | OUTPATIENT
Start: 2025-03-18

## 2025-03-18 RX ORDER — LEVOTHYROXINE SODIUM 137 UG/1
TABLET ORAL
Qty: 24 TABLET | Refills: 0 | Status: SHIPPED | OUTPATIENT
Start: 2025-03-18

## 2025-06-03 DIAGNOSIS — E03.9 HYPOTHYROIDISM, UNSPECIFIED TYPE: ICD-10-CM

## 2025-06-04 ENCOUNTER — TELEPHONE (OUTPATIENT)
Age: 80
End: 2025-06-04

## 2025-06-04 ENCOUNTER — RESULTS FOLLOW-UP (OUTPATIENT)
Age: 80
End: 2025-06-04

## 2025-06-04 DIAGNOSIS — E03.9 HYPOTHYROIDISM, UNSPECIFIED TYPE: Primary | ICD-10-CM

## 2025-06-04 LAB
T4 FREE SERPL-MCNC: 1.7 NG/DL (ref 0.8–1.5)
TSH SERPL DL<=0.05 MIU/L-ACNC: 0.14 UIU/ML (ref 0.36–3.74)

## 2025-06-04 NOTE — TELEPHONE ENCOUNTER
Reason for call:  Spoke with pt. Pt returning [facility] phone call. Pt requested a call back.     Is this a new problem: Yes    Date of last appointment:  6/25/2024     Can we respond via [employer]: No    Best call back number: Yeni Vargas ASHTYN   [phone]

## 2025-06-05 RX ORDER — LEVOTHYROXINE SODIUM 125 UG/1
TABLET ORAL
Qty: 1 TABLET | Refills: 0
Start: 2025-06-05 | End: 2025-06-06 | Stop reason: SDUPTHER

## 2025-06-06 DIAGNOSIS — E03.9 HYPOTHYROIDISM, UNSPECIFIED TYPE: Primary | ICD-10-CM

## 2025-06-06 RX ORDER — LEVOTHYROXINE SODIUM 125 UG/1
TABLET ORAL
Qty: 90 TABLET | Refills: 0 | Status: SHIPPED | OUTPATIENT
Start: 2025-06-06

## 2025-07-15 NOTE — TELEPHONE ENCOUNTER
Ordered Per  Verbal Order.   F/u 01/21/2026  Last appt: 1/14/2025   Future Appointments   Date Time Provider Department Center   8/21/2025  1:30 PM Vandana Rodriguez MD [facility] DEP   1/21/2026  9:40 AM Gina Samuel APRN - CNP [facility] AMB       Requested Prescriptions     Pending Prescriptions Disp Refills    apixaban (ELIQUIS) 2.5 MG TABS tablet 180 tablet 1     Sig: Take 1 tablet by mouth 2 times daily         Prior labs and Blood pressures:  BP Readings from Last 3 Encounters:   02/18/25 (!) 134/47   01/14/25 (!) 142/82   09/20/24 (!) 144/84     Lab Results   Component Value Date/Time     06/26/2024 10:30 AM    K 4.7 06/26/2024 10:30 AM     06/26/2024 10:30 AM    CO2 29 06/26/2024 10:30 AM    BUN 8 06/26/2024 10:30 AM    GFRAA >60 09/01/2022 10:44 AM     No results found for: \"HBA1C\", \"[id number]\"  Lab Results   Component Value Date/Time    CHOL 143 06/26/2024 10:30 AM    HDL 70 06/26/2024 10:30 AM    LDL 55 06/26/2024 10:30 AM    LDL 50.4 03/02/2023 09:05 AM    VLDL 18 06/26/2024 10:30 AM    VLDL 24 10/13/2020 01:15 PM     No results found for: \"VITD3\"    Lab Results   Component Value Date/Time    TSH 0.14 06/03/2025 12:02 PM

## 2025-07-28 DIAGNOSIS — E03.9 HYPOTHYROIDISM, UNSPECIFIED TYPE: ICD-10-CM

## 2025-07-28 LAB
T4 FREE SERPL-MCNC: 1.1 NG/DL (ref 0.8–1.5)
TSH SERPL DL<=0.05 MIU/L-ACNC: 0.72 UIU/ML (ref 0.36–3.74)

## 2025-08-07 SDOH — HEALTH STABILITY: PHYSICAL HEALTH: ON AVERAGE, HOW MANY MINUTES DO YOU ENGAGE IN EXERCISE AT THIS LEVEL?: 20 MIN

## 2025-08-07 SDOH — HEALTH STABILITY: PHYSICAL HEALTH: ON AVERAGE, HOW MANY DAYS PER WEEK DO YOU ENGAGE IN MODERATE TO STRENUOUS EXERCISE (LIKE A BRISK WALK)?: 3 DAYS

## 2025-08-07 ASSESSMENT — PATIENT HEALTH QUESTIONNAIRE - PHQ9
1. LITTLE INTEREST OR PLEASURE IN DOING THINGS: NOT AT ALL
SUM OF ALL RESPONSES TO PHQ QUESTIONS 1-9: 0
SUM OF ALL RESPONSES TO PHQ QUESTIONS 1-9: 0
2. FEELING DOWN, DEPRESSED OR HOPELESS: NOT AT ALL
SUM OF ALL RESPONSES TO PHQ QUESTIONS 1-9: 0
SUM OF ALL RESPONSES TO PHQ QUESTIONS 1-9: 0

## 2025-08-07 ASSESSMENT — LIFESTYLE VARIABLES
HOW MANY STANDARD DRINKS CONTAINING ALCOHOL DO YOU HAVE ON A TYPICAL DAY: 0
HOW OFTEN DO YOU HAVE SIX OR MORE DRINKS ON ONE OCCASION: 1
HOW OFTEN DO YOU HAVE A DRINK CONTAINING ALCOHOL: NEVER
HOW OFTEN DO YOU HAVE A DRINK CONTAINING ALCOHOL: 1
HOW MANY STANDARD DRINKS CONTAINING ALCOHOL DO YOU HAVE ON A TYPICAL DAY: PATIENT DOES NOT DRINK

## 2025-08-18 SDOH — ECONOMIC STABILITY: FOOD INSECURITY: WITHIN THE PAST 12 MONTHS, THE FOOD YOU BOUGHT JUST DIDN'T LAST AND YOU DIDN'T HAVE MONEY TO GET MORE.: NEVER TRUE

## 2025-08-18 SDOH — ECONOMIC STABILITY: INCOME INSECURITY: IN THE LAST 12 MONTHS, WAS THERE A TIME WHEN YOU WERE NOT ABLE TO PAY THE MORTGAGE OR RENT ON TIME?: NO

## 2025-08-18 SDOH — ECONOMIC STABILITY: FOOD INSECURITY: WITHIN THE PAST 12 MONTHS, YOU WORRIED THAT YOUR FOOD WOULD RUN OUT BEFORE YOU GOT MONEY TO BUY MORE.: NEVER TRUE

## 2025-08-18 SDOH — ECONOMIC STABILITY: TRANSPORTATION INSECURITY
IN THE PAST 12 MONTHS, HAS THE LACK OF TRANSPORTATION KEPT YOU FROM MEDICAL APPOINTMENTS OR FROM GETTING MEDICATIONS?: NO

## 2025-08-21 ENCOUNTER — OFFICE VISIT (OUTPATIENT)
Age: 80
End: 2025-08-21
Payer: MEDICARE

## 2025-08-21 VITALS
HEART RATE: 92 BPM | SYSTOLIC BLOOD PRESSURE: 135 MMHG | RESPIRATION RATE: 16 BRPM | OXYGEN SATURATION: 97 % | WEIGHT: 130.4 LBS | DIASTOLIC BLOOD PRESSURE: 73 MMHG | HEIGHT: 68 IN | TEMPERATURE: 99.6 F | BODY MASS INDEX: 19.76 KG/M2

## 2025-08-21 DIAGNOSIS — Z86.73 HISTORY OF CVA (CEREBROVASCULAR ACCIDENT): ICD-10-CM

## 2025-08-21 DIAGNOSIS — Z00.00 MEDICARE ANNUAL WELLNESS VISIT, SUBSEQUENT: Primary | ICD-10-CM

## 2025-08-21 DIAGNOSIS — J06.9 VIRAL UPPER RESPIRATORY TRACT INFECTION: ICD-10-CM

## 2025-08-21 DIAGNOSIS — R29.898 WEAKNESS OF BOTH LOWER EXTREMITIES: ICD-10-CM

## 2025-08-21 DIAGNOSIS — E78.00 HYPERCHOLESTEROLEMIA: ICD-10-CM

## 2025-08-21 DIAGNOSIS — I48.0 AF (PAROXYSMAL ATRIAL FIBRILLATION) (HCC): ICD-10-CM

## 2025-08-21 DIAGNOSIS — M81.0 AGE-RELATED OSTEOPOROSIS WITHOUT CURRENT PATHOLOGICAL FRACTURE: ICD-10-CM

## 2025-08-21 DIAGNOSIS — E03.9 HYPOTHYROIDISM, UNSPECIFIED TYPE: ICD-10-CM

## 2025-08-21 DIAGNOSIS — Z12.31 VISIT FOR SCREENING MAMMOGRAM: ICD-10-CM

## 2025-08-21 PROCEDURE — 1036F TOBACCO NON-USER: CPT | Performed by: INTERNAL MEDICINE

## 2025-08-21 PROCEDURE — G8427 DOCREV CUR MEDS BY ELIG CLIN: HCPCS | Performed by: INTERNAL MEDICINE

## 2025-08-21 PROCEDURE — 99214 OFFICE O/P EST MOD 30 MIN: CPT | Performed by: INTERNAL MEDICINE

## 2025-08-21 PROCEDURE — 1090F PRES/ABSN URINE INCON ASSESS: CPT | Performed by: INTERNAL MEDICINE

## 2025-08-21 PROCEDURE — G8399 PT W/DXA RESULTS DOCUMENT: HCPCS | Performed by: INTERNAL MEDICINE

## 2025-08-21 PROCEDURE — 1160F RVW MEDS BY RX/DR IN RCRD: CPT | Performed by: INTERNAL MEDICINE

## 2025-08-21 PROCEDURE — G8420 CALC BMI NORM PARAMETERS: HCPCS | Performed by: INTERNAL MEDICINE

## 2025-08-21 PROCEDURE — 1159F MED LIST DOCD IN RCRD: CPT | Performed by: INTERNAL MEDICINE

## 2025-08-21 PROCEDURE — 1123F ACP DISCUSS/DSCN MKR DOCD: CPT | Performed by: INTERNAL MEDICINE

## 2025-08-21 PROCEDURE — 1126F AMNT PAIN NOTED NONE PRSNT: CPT | Performed by: INTERNAL MEDICINE

## 2025-08-21 PROCEDURE — G0439 PPPS, SUBSEQ VISIT: HCPCS | Performed by: INTERNAL MEDICINE

## 2025-08-29 ENCOUNTER — HOSPITAL ENCOUNTER (OUTPATIENT)
Facility: HOSPITAL | Age: 80
Discharge: HOME | End: 2025-09-01

## 2025-08-29 DIAGNOSIS — E78.00 HYPERCHOLESTEROLEMIA: ICD-10-CM

## 2025-08-29 DIAGNOSIS — M81.0 AGE-RELATED OSTEOPOROSIS WITHOUT CURRENT PATHOLOGICAL FRACTURE: ICD-10-CM

## 2025-08-29 LAB
25(OH)D3 SERPL-MCNC: 52.6 NG/ML (ref 30–100)
ALBUMIN SERPL-MCNC: 4 G/DL (ref 3.5–5.2)
ALBUMIN/GLOB SERPL: 1.4 (ref 1.1–2.2)
ALP SERPL-CCNC: 88 U/L (ref 35–104)
ALT SERPL-CCNC: 30 U/L (ref 10–35)
ANION GAP SERPL CALC-SCNC: 10 MMOL/L (ref 2–14)
AST SERPL-CCNC: 32 U/L (ref 10–35)
BASOPHILS # BLD: 0.04 K/UL (ref 0–0.1)
BASOPHILS NFR BLD: 0.6 % (ref 0–1)
BILIRUB SERPL-MCNC: 0.5 MG/DL (ref 0–1.2)
BUN SERPL-MCNC: 12 MG/DL (ref 8–23)
BUN/CREAT SERPL: 16 (ref 12–20)
CALCIUM SERPL-MCNC: 9.9 MG/DL (ref 8.8–10.2)
CHLORIDE SERPL-SCNC: 102 MMOL/L (ref 98–107)
CHOLEST SERPL-MCNC: 153 MG/DL (ref 0–200)
CO2 SERPL-SCNC: 29 MMOL/L (ref 20–29)
CREAT SERPL-MCNC: 0.71 MG/DL (ref 0.6–1)
DIFFERENTIAL METHOD BLD: NORMAL
EOSINOPHIL # BLD: 0.05 K/UL (ref 0–0.4)
EOSINOPHIL NFR BLD: 0.7 % (ref 0–7)
ERYTHROCYTE [DISTWIDTH] IN BLOOD BY AUTOMATED COUNT: 13.8 % (ref 11.5–14.5)
GLOBULIN SER CALC-MCNC: 2.9 G/DL (ref 2–4)
GLUCOSE SERPL-MCNC: 104 MG/DL (ref 65–100)
HCT VFR BLD AUTO: 44.5 % (ref 35–47)
HDLC SERPL-MCNC: 55 MG/DL (ref 40–60)
HDLC SERPL: 2.8 (ref 0–5)
HGB BLD-MCNC: 13.9 G/DL (ref 11.5–16)
IMM GRANULOCYTES # BLD AUTO: 0.02 K/UL (ref 0–0.04)
IMM GRANULOCYTES NFR BLD AUTO: 0.3 % (ref 0–0.5)
LDLC SERPL CALC-MCNC: 76 MG/DL (ref 0–100)
LYMPHOCYTES # BLD: 1.31 K/UL (ref 0.8–3.5)
LYMPHOCYTES NFR BLD: 19 % (ref 12–49)
MCH RBC QN AUTO: 29 PG (ref 26–34)
MCHC RBC AUTO-ENTMCNC: 31.2 G/DL (ref 30–36.5)
MCV RBC AUTO: 92.9 FL (ref 80–99)
MONOCYTES # BLD: 0.46 K/UL (ref 0–1)
MONOCYTES NFR BLD: 6.7 % (ref 5–13)
NEUTS SEG # BLD: 5.01 K/UL (ref 1.8–8)
NEUTS SEG NFR BLD: 72.7 % (ref 32–75)
NRBC # BLD: 0 K/UL (ref 0–0.01)
NRBC BLD-RTO: 0 PER 100 WBC
PLATELET # BLD AUTO: 286 K/UL (ref 150–400)
PMV BLD AUTO: 10.4 FL (ref 8.9–12.9)
POTASSIUM SERPL-SCNC: 4.9 MMOL/L (ref 3.5–5.1)
PROT SERPL-MCNC: 6.9 G/DL (ref 6.4–8.3)
RBC # BLD AUTO: 4.79 M/UL (ref 3.8–5.2)
SODIUM SERPL-SCNC: 141 MMOL/L (ref 136–145)
TRIGL SERPL-MCNC: 113 MG/DL (ref 0–150)
VLDLC SERPL CALC-MCNC: 23 MG/DL
WBC # BLD AUTO: 6.9 K/UL (ref 3.6–11)

## (undated) DEVICE — PINNACLE INTRODUCER SHEATH: Brand: PINNACLE

## (undated) DEVICE — NEEDLE ANGIO 18GA L9CM NRML 1 WALL SMOOTH FINISH CLR HUB FOR

## (undated) DEVICE — SUTURE VCRL SZ 3-0 L27IN ABSRB VLT L26MM SH 1/2 CIR J316H

## (undated) DEVICE — CATHETER ELECTROPHYSIOLOGY D 2-5-2 MM 1 MM 6 FRX115 CM 4 FIX

## (undated) DEVICE — CLEANGUIDE DISPOSABLE MARKED SPRING TIP GUIDEWIRE, 1.86 MM X 210 CM: Brand: CLEANGUIDE

## (undated) DEVICE — DILATOR ESOPHAGEAL CLEANGUIDE DISP OTW  15MM

## (undated) DEVICE — GUIDEWIRE VASC L180CM DIA0.035IN 3MM PTFE J TIP EXCHG FIX

## (undated) DEVICE — ADHESIVE SKIN CLSR 1ML TISS HI VISC EXOFIN

## (undated) DEVICE — CABLE CATH L10FT YEL CONN 10-12 PIN ELECTROGRAM CONDUCTION

## (undated) DEVICE — Device

## (undated) DEVICE — ORISE PROKNIFE 1.5 MM ELECTRODE: Brand: ORISE™ PROKNIFE

## (undated) DEVICE — REM POLYHESIVE ADULT PATIENT RETURN ELECTRODE: Brand: VALLEYLAB

## (undated) DEVICE — PATCH CARTO 3 EXT REF --

## (undated) DEVICE — ORISE PROKNIFE 3.0 MM ELECTRODE: Brand: ORISE™ PROKNIFE

## (undated) DEVICE — CABLE CATH L10FT RED PIN CONN 25-34 FOR NAVISTAR CARTO 3

## (undated) DEVICE — CATHETER EP 6FR L92CM 2-5-2MM SPC TIP 1MM 4 ELECTRD D CRV

## (undated) DEVICE — CABLE EXT EPS B/T/BLK 150CM --

## (undated) DEVICE — CATH 4MM NAVIGATIONAL BI-DIREC --

## (undated) DEVICE — CABLE CATH L10FT BLU CONN 10-34 PIN ELECTROGRAM CONDUCTION

## (undated) DEVICE — MEDI-TRACE CADENCE ADULT, DEFIBRILLATION ELECTRODE -RTS  (10 PR/PK) - PHYSIO-CONTROL: Brand: MEDI-TRACE CADENCE

## (undated) DEVICE — PERCLOSE PROGLIDE™ SUTURE-MEDIATED CLOSURE SYSTEM: Brand: PERCLOSE PROGLIDE™

## (undated) DEVICE — HEART CATH-SFMC: Brand: MEDLINE INDUSTRIES, INC.

## (undated) DEVICE — CATH BI DIR 7FR DEFL CS NON --